# Patient Record
Sex: FEMALE | Race: WHITE | Employment: OTHER | URBAN - METROPOLITAN AREA
[De-identification: names, ages, dates, MRNs, and addresses within clinical notes are randomized per-mention and may not be internally consistent; named-entity substitution may affect disease eponyms.]

---

## 2017-01-19 DIAGNOSIS — I10 BENIGN ESSENTIAL HYPERTENSION: ICD-10-CM

## 2017-01-19 DIAGNOSIS — E11.9 DIABETES MELLITUS TYPE 2, NONINSULIN DEPENDENT (HCC): ICD-10-CM

## 2017-01-19 RX ORDER — CLOPIDOGREL BISULFATE 75 MG/1
75 TABLET ORAL DAILY
Qty: 90 TAB | Refills: 1 | Status: SHIPPED | OUTPATIENT
Start: 2017-01-19 | End: 2017-11-27 | Stop reason: SDUPTHER

## 2017-01-22 RX ORDER — LISINOPRIL 10 MG/1
10 TABLET ORAL DAILY
Qty: 90 TAB | Refills: 0 | Status: SHIPPED | OUTPATIENT
Start: 2017-01-22 | End: 2017-02-03 | Stop reason: SINTOL

## 2017-01-22 NOTE — TELEPHONE ENCOUNTER
Future Appointments:  1/31/2017 1:20 PM Shilpa Samuels  [address]  2/3/2017 8:15 AM Brynn Larson MD PA[facility]

## 2017-01-24 ENCOUNTER — TELEPHONE (OUTPATIENT)
Dept: CARDIOLOGY CLINIC | Age: 67
End: 2017-01-24

## 2017-01-24 NOTE — TELEPHONE ENCOUNTER
Called patient. Verified patient's identity with two identifiers. Notified patient of results and Dr. Judy Peterson message below. She asked if she needs to still f/u Tuesday. I told her I'd check with Dr. Storm Kumari and call her back.     Dr. Romana Kettle advise on f/u. thanks

## 2017-01-24 NOTE — TELEPHONE ENCOUNTER
----- Message from Paris Bueno MD sent at 1/24/2017  4:59 PM EST -----  No afib, so do not need to change anything at this point

## 2017-01-24 NOTE — TELEPHONE ENCOUNTER
Patient's end of service event monitor report is on your desk for review.  F/u scheduled with you for January 31st.  Future Appointments  Date Time Provider Frances Mclaughlin   1/31/2017 1:20 PM Erin Deluca  [address]   2/3/2017 8:15 AM Torrie Butler  [facility]

## 2017-01-26 NOTE — TELEPHONE ENCOUNTER
Called pt. Notified her Dr. Wild Skinner said she can f/u prn. Took her off schedule. Patient verbalizes understanding and denies further questions or concerns.

## 2017-02-03 ENCOUNTER — OFFICE VISIT (OUTPATIENT)
Dept: FAMILY MEDICINE CLINIC | Age: 67
End: 2017-02-03

## 2017-02-03 ENCOUNTER — HOSPITAL ENCOUNTER (OUTPATIENT)
Dept: LAB | Age: 67
Discharge: HOME | End: 2017-02-03
Payer: MEDICARE

## 2017-02-03 VITALS
HEIGHT: 66 IN | HEART RATE: 70 BPM | TEMPERATURE: 98.4 F | RESPIRATION RATE: 18 BRPM | WEIGHT: 248 LBS | DIASTOLIC BLOOD PRESSURE: 86 MMHG | OXYGEN SATURATION: 96 % | SYSTOLIC BLOOD PRESSURE: 134 MMHG | BODY MASS INDEX: 39.86 KG/M2

## 2017-02-03 DIAGNOSIS — G45.9 TRANSIENT CEREBRAL ISCHEMIA, UNSPECIFIED TYPE: ICD-10-CM

## 2017-02-03 DIAGNOSIS — I10 BENIGN ESSENTIAL HYPERTENSION: ICD-10-CM

## 2017-02-03 DIAGNOSIS — M13.0 ARTHRITIS OF MULTIPLE SITES: ICD-10-CM

## 2017-02-03 DIAGNOSIS — E11.9 DIABETES MELLITUS TYPE 2, NONINSULIN DEPENDENT (HCC): Primary | ICD-10-CM

## 2017-02-03 DIAGNOSIS — E78.00 HYPERCHOLESTEROLEMIA: ICD-10-CM

## 2017-02-03 PROBLEM — H26.9 CATARACTS, BILATERAL: Status: ACTIVE | Noted: 2017-02-03

## 2017-02-03 PROCEDURE — 84460 ALANINE AMINO (ALT) (SGPT): CPT

## 2017-02-03 PROCEDURE — 84443 ASSAY THYROID STIM HORMONE: CPT

## 2017-02-03 PROCEDURE — 83036 HEMOGLOBIN GLYCOSYLATED A1C: CPT

## 2017-02-03 PROCEDURE — 82043 UR ALBUMIN QUANTITATIVE: CPT

## 2017-02-03 PROCEDURE — 80061 LIPID PANEL: CPT

## 2017-02-03 PROCEDURE — 84450 TRANSFERASE (AST) (SGOT): CPT

## 2017-02-03 PROCEDURE — 80048 BASIC METABOLIC PNL TOTAL CA: CPT

## 2017-02-03 RX ORDER — LOSARTAN POTASSIUM 50 MG/1
50 TABLET ORAL DAILY
Qty: 30 TAB | Refills: 1 | Status: SHIPPED | OUTPATIENT
Start: 2017-02-03 | End: 2017-03-28 | Stop reason: SDUPTHER

## 2017-02-03 RX ORDER — TRIAMTERENE/HYDROCHLOROTHIAZID 37.5-25 MG
TABLET ORAL
Refills: 3 | COMMUNITY
Start: 2016-11-19 | End: 2017-02-03

## 2017-02-03 RX ORDER — MULTIVITAMIN
1 TABLET ORAL DAILY
COMMUNITY

## 2017-02-03 NOTE — PROGRESS NOTES
HISTORY OF PRESENT ILLNESS  Betsy Barron is a 74 y.o. female. HPI  Fasting follow up diabetes, cholesterol, hypertension, labs and medication check. Not checking her BS's. Overall has been getting along well and feeling pretty well in general.  Still bothered by OA in hips and knees but she is back to exercising. Tries to walk on treadmill on occ but walks in the pool 3 x a week up to an hour. Her wt is down some. She tries to stay as active as possible but some days the pain is near debilitating. She takes Tylenol Arthritis 2 tabs qam so she can get her day going. She takes Tylenol PM qhs prn which also helps. Trying to eat healthier now that the holidays have passed. At her last visit w/ me 11-18-16, I started her on Lisinopril for BP control and renal protection. Shortly after starting this w/in about 1-2 weeks she developed an annoying episodic dry cough. No other symptoms whatsoever, just the annoying dry tickly cough. Wont go away. No sputum, congestion, drainage, fevers, SOB, wheeze. BP's have been good. She has also had f/u w/ Neuro in regards to the TIA and her asa was changed to Plavix only. No bleeding or bruising issues. Review of Systems   Constitutional: Negative. HENT: Negative. Eyes:        Seeing her eye doctor routinely, scheduled for cataract surgery right eye later this year   Respiratory: Negative for hemoptysis, sputum production, shortness of breath and wheezing. Cardiovascular: Negative. Negative for chest pain and palpitations. Gastrointestinal: Negative. Genitourinary: Negative. Musculoskeletal: Negative for myalgias. Neurological: Negative. Endo/Heme/Allergies: Does not bruise/bleed easily.      Problem List  Date Reviewed: 2/3/2017          Codes Class Noted    Cataracts, bilateral ICD-10-CM: H26.9  ICD-9-CM: 366.9  2/3/2017    Overview Signed 2/3/2017  8:58 AM by Dragan Allison MD     R>L, Dr Alphia Boxer TIA (transient ischemic attack) ICD-10-CM: G45.9  ICD-9-CM: 435.9  2016    Overview Signed 2016 11:02 AM by Brynn Larson MD     [facility] 16-11/3/16, Neuro Dr Alli Hoyos             Diabetes mellitus type 2, noninsulin dependent ([facility] ICD-10-CM: E11.9  ICD-9-CM: 250.00  2016    Overview Signed 2016  1:01 PM by Brynn Larson MD     10/2014             ACP (advance care planning) ICD-10-CM: Z71.89  ICD-9-CM: V65.49  2016    Overview Signed 2016  8:36 AM by Brynn Larson MD     Patient has an AMD             Benign essential hypertension ICD-10-CM: I10  ICD-9-CM: 401.1  2016        Bee sting allergy with anaphylaxis reaction ICD-10-CM: Z91.038  ICD-9-CM: V15.06  2015        Arthritis of right foot & ankle ICD-10-CM: M19.90  ICD-9-CM: 716.97  2015    Overview Signed 2015 10:38 AM by Brynn Larson MD     Ortho Dr Bhanu Carr; used a boot x 2 months, off              Right knee pain ICD-10-CM: M25.561  ICD-9-CM: 719.46  3/24/2014    Overview Signed 3/24/2014 11:45 AM by Brynn Larson MD     2014; CSI per Ortho Dr Avery Matute office             Hypercholesterolemia ICD-10-CM: E78.00  ICD-9-CM: 272.0  1/3/2014        Vertebrobasilar artery syndrome ICD-10-CM: G45.0  ICD-9-CM: 435.3  2013    Overview Addendum 2013 12:53 PM by Brynn Larson MD     ER; Neuro Dr Tha Quintana; dc'd on ASA, Zocor and Metoprolol             Impaired fasting glucose ICD-10-CM: R73.01  ICD-9-CM: 790.21  4/3/2012    Overview Signed 4/3/2012  3:09 PM by Brynn Larson MD     3/2012             Overactive bladder ICD-10-CM: C86.82  ICD-9-CM: 596.51  3/28/2011        S/P colonoscopy with polypectomy ICD-10-CM: H09.710  ICD-9-CM: V45.89  Unknown    Overview Signed 3/29/2010 12:47 PM by Brynn Larson MD     benign polyp, f/u 5yrs; Dr. Nola Trevino              (normal spontaneous vaginal delivery) ICD-10-CM: O80  ICD-9-CM: 761 Unknown    Overview Signed 3/29/2010 12:48 PM by Philippe Clarke MD     A2, NSVDx3, SAB x2             S/P abdominal hysterectomy and left salpingo-oophorectomy ICD-10-CM: Z90.710, Z90.79, Z90.721  ICD-9-CM: V88.01, V45.77  Unknown    Overview Signed 3/29/2010 12:49 PM by Philippe Clarke MD     Uterine Fibroids; no HRT             S/P SCC excised from left hand ICD-10-CM: D69.308  ICD-9-CM: 173.62  Unknown    Overview Signed 3/29/2010 12:54 PM by Philippe Clarke MD     Lt hand, SCC excised, Dr. Jarrod Tamayo             Postmenopausal, LMP 38 yo, No HRT history ICD-10-CM: Z78.0  ICD-9-CM: V49.81  3/18/2010        Osteopenia ICD-10-CM: M85.80  ICD-9-CM: 733.90  10/3/2007            Past Surgical History   Procedure Laterality Date    Hx tonsillectomy       T&A    Pr lap,cholecystectomy      Hm dexa scan  10/2007     Osteopenia; spine -1.5, fem neck -1.1    Colonoscopy  2006     q5yrs; Dr Efren Carr; Diverticulosis, internal hemorrhoids, benign polyp    Pr colonoscopy flx dx w/collj spec when pfrmd  2008 , repeat 5 yrs     for rectal bleeding; hemorrhoids, diverticulae, small polyp    Echo 2d adult       Normal    Pr electrocardiogram report  ,      Normal    Hx cholecystectomy      Hx colonoscopy  2013, Dr Aminata Calixial     Diverticulosis only, f/u 10 yrs    Hx orthopaedic       benign tumor removed from left wrist    Hx hysterectomy       [facility]; NO HRT    Hx orthopaedic Right 16, Dr Lester Song     right foot pin/plate       OB History      Para Term  AB TAB SAB Ectopic Multiple Living    5 3 3  2  2           Obstetric Comments    Gyn Dr Shani Owusu          Current Outpatient Prescriptions   Medication Sig    calcium-cholecalciferol, D3, (CALTRATE 600+D) tablet Take 1 Tab by mouth daily.  ACETAMINOPHEN/DIPHENHYDRAMINE (TYLENOL PM PO) Take  by mouth nightly as needed.     simvastatin (ZOCOR) 10 mg tablet TAKE 1 TABLET BY MOUTH EVERY EVENING  metoprolol tartrate (LOPRESSOR) 25 mg tablet TAKE 1 TABLET BY MOUTH EVERY 12 HOURS    lisinopril (PRINIVIL, ZESTRIL) 10 mg tablet Take 1 Tab by mouth daily.  clopidogrel (PLAVIX) 75 mg tab Take 1 Tab by mouth daily.  metFORMIN ER (GLUCOPHAGE XR) 500 mg tablet TAKE 1 TABLET ONCE DAILY WITH DINNER    acetaminophen (TYLENOL) 650 mg CR tablet Take 1,300 mg by mouth daily.  VESICARE 10 mg tablet TAKE ONE (1) TABLET(S) ONCE DAILY    EPIPEN 2-MONICA 0.3 mg/0.3 mL (1:1,000) injection USE IMMEDIATELY AS NEEDED FOR ALLERGIC REACTION TO BEE STING    L. acidoph & paracasei- S therm- Bifido (JULIETA-Q) 8 billion cell cap cap Take 1 Cap by mouth daily.  vitamin e 400 unit Tab Take  by mouth daily.  multivitamins-minerals-lutein (CENTRUM SILVER) Tab Take 1 Tab by mouth daily. No current facility-administered medications for this visit.       Allergies   Allergen Reactions    Oxycodone Hives, Nausea Only and Other (comments)    Bee Sting [Sting, Bee] Swelling    Fish Oil Other (comments)     GI upset    Motrin [Ibuprofen] Swelling and Myalgia    Oxycodone-Acetaminophen Nausea and Vomiting    Pcn [Penicillins] Swelling    Promethazine-Dm Other (comments)     HA, nightmares     Social History     Social History    Marital status:      Spouse name: N/A    Number of children: 3    Years of education: N/A     Occupational History    Fully Retired 2/2008    [employer] her grandchildren 3 x a week      Retired     Social History Main Topics    Smoking status: Never Smoker    Smokeless tobacco: Never Used    Alcohol use 1.0 oz/week     2 Glasses of wine per week      Comment: 2 glasses of wine per week    Drug use: No    Sexual activity: Yes     Partners: Male     Other Topics Concern     Service No    Blood Transfusions No    Caffeine Concern No     2 cups of coffee qam    Occupational Exposure No    Hobby Hazards No    Sleep Concern No    Stress Concern No    Weight Concern No    Special Diet No    Back Care No    Exercise Yes     walks in pool 3 x a week x 1 hr    Bike Helmet Yes    Seat Belt Yes    Self-Exams Yes     Social History Narrative    Patient has an AMD     Immunization History   Administered Date(s) Administered    Influenza High Dose Vaccine PF 2016    Influenza Vaccine 2015    Influenza Vaccine Split 10/01/2012    Pneumococcal Polysaccharide (PPSV-23) 2014    Pneumococcal Vaccine (Unspecified Type) 2009    TD Vaccine 1999    TDAP Vaccine 2009    Td, Adsorbed PF 2015    Varicella Virus Vaccine Live 10/01/2011    Zoster 2011       Family History   Problem Relation Age of Onset    Thyroid Disease Mother      h/o Grave's dz;  age 81yo   Gert. Osteoporosis Mother    Gert. Delayed Awakening Mother    Gert. Stroke Father       age 80    Other Father      AAA age 76, macular degeneration    Alcohol abuse Father      smoker and drinker    Osteoporosis Sister     Other Son      gout    Gout Son     Dementia Maternal Grandmother      dementia and mental illness    Other Brother      heart murmur    Liver Disease Brother      hepatitis c    Anesth Problems Neg Hx      Visit Vitals    /86 (BP 1 Location: Left arm, BP Patient Position: Sitting)    Pulse 70    Temp 98.4 °F (36.9 °C) (Oral)    Resp 18    Ht 5' 6\" (1.676 m)    Wt 248 lb (112.5 kg)    SpO2 96%    BMI 40.03 kg/m2     Physical Exam   Constitutional: No distress. Neck: Neck supple. Carotid bruit is not present. No thyromegaly present. Cardiovascular: Normal rate, regular rhythm and normal heart sounds. No murmur heard. Pulmonary/Chest: Effort normal and breath sounds normal. No respiratory distress. She has no wheezes. She has no rales. Neurological: Coordination normal.   Skin: Skin is warm and dry. Vitals reviewed. ASSESSMENT and PLAN    ICD-10-CM ICD-9-CM    1.  Diabetes mellitus type 2, noninsulin dependent (HCC) E11.9 250.00 HM DIABETES EYE EXAM      MICROALBUMIN, UR, RAND W/ MICROALBUMIN/CREA RATIO      losartan (COZAAR) 50 mg tablet      METABOLIC PANEL, BASIC      HEMOGLOBIN A1C WITH EAG   2. Hypercholesterolemia E78.00 272.0 ALT      AST      LIPID PANEL      TSH 3RD GENERATION   3. Benign essential hypertension I10 401.1 losartan (COZAAR) 50 mg tablet      METABOLIC PANEL, BASIC      TSH 3RD GENERATION   4. Transient cerebral ischemia, unspecified type G45.9 435.9 Cont BP control, target lipid goal, Plavix daily (saw Neuro  and on monotherapy w/o asa ongoing)   5.  Arthritis/DJD of multiple sites M19.90 716.99 Tylenol Arthritis every day prn and cont pool therapy     Fasting labs today  Reviewed diet, nutrition, exercise and weight control  Commended on her efforts thus far  Cardiovascular risk and specific lipid/LDL/BS/BP goals reviewed  Reviewed medications and side effects in detail  Stop Lisinopril  Replace w/ trial of Cozaar 50 mg every day  Continue interim BP monitoring and update if not goal  Further follow up & other recommendations pending review of labs as well  If all remains good and stable, routine follow up 6months, sooner prn  Form for handicap placard, copy for chart

## 2017-02-03 NOTE — MR AVS SNAPSHOT
Visit Information Date & Time Provider Department Dept. Phone Encounter #  
 2/3/2017  8:15 AM Ry Mcghee, [address] [facility] [phone] [id number] Upcoming Health Maintenance Date Due MICROALBUMIN Q1 2/11/2017 EYE EXAM RETINAL OR DILATED Q1 2/11/2017 HEMOGLOBIN A1C Q6M 5/2/2017 FOOT EXAM Q1 8/2/2017 MEDICARE YEARLY EXAM 8/3/2017 LIPID PANEL Q1 11/2/2017 GLAUCOMA SCREENING Q2Y 2/11/2018 BREAST CANCER SCRN MAMMOGRAM 6/15/2018 COLONOSCOPY 10/16/2023 DTaP/Tdap/Td series (3 - Td) 8/31/2025 Allergies as of 2/3/2017  Review Complete On: 2/3/2017 By: Ry Mcghee MD  
  
 Severity Noted Reaction Type Reactions Oxycodone Medium 08/02/2016    Hives, Nausea Only, Other (comments) Bee Sting [Sting, Bee]  03/18/1975    Swelling Fish Oil  11/20/2012    Other (comments) GI upset Lisinopril  02/03/2017    Cough Motrin [Ibuprofen]  03/18/1976    Swelling, Myalgia Oxycodone-acetaminophen  02/11/2016    Nausea and Vomiting Pcn [Penicillins]  03/18/2010   Systemic Swelling Promethazine-dm  03/29/2010   Side Effect Other (comments) HA, nightmares Current Immunizations  Reviewed on 2/3/2017 Name Date Influenza High Dose Vaccine PF 11/18/2016 Influenza Vaccine 11/8/2015 Influenza Vaccine Split 10/1/2012 Pneumococcal Polysaccharide (PPSV-23) 3/24/2014 Pneumococcal Vaccine (Unspecified Type) 2/13/2009 TD Vaccine 5/7/1999 TDAP Vaccine 2/13/2009 Td, Adsorbed PF 8/31/2015 Varicella Virus Vaccine Live 10/1/2011 Zoster 1/1/2011 Reviewed by Ry Mcghee MD on 2/3/2017 at  9:14 AM  
You Were Diagnosed With   
  
 Codes Comments Diabetes mellitus type 2, [facility]insulin [facility]    -  Primary ICD-10-CM: E11.9 ICD-9-CM: 250.00 Hypercholesterolemia     ICD-10-CM: E78.00 ICD-9-CM: 272.0 Benign essential hypertension     ICD-10-CM: I10 
ICD-9-CM: 401.1 Vitals BP Pulse Temp Resp Height(growth percentile) Weight(growth percentile) 134/86 (BP 1 Location: Left arm, BP Patient Position: Sitting) 70 98.4 °F (36.9 °C) (Oral) 18 5' 6\" (1.676 m) 248 lb (112.5 kg) SpO2 BMI OB Status Smoking Status 96% 40.03 kg/m2 Hysterectomy Never Smoker BMI and BSA Data Body Mass Index Body Surface Area 40.03 kg/m 2 2.29 m 2 Preferred Pharmacy Pharmacy Name Phone [facility] [address] [place] [address], [address] [place] [address] [phone] Your Updated Medication List  
  
   
This list is accurate as of: 2/3/17  9:17 AM.  Always use your most recent med list.  
  
  
  
  
 acetaminophen 650 mg CR tablet Commonly known as:  TYLENOL Take 1,300 mg by mouth daily. calcium-cholecalciferol (D3) tablet Commonly known as:  CALTRATE 600+D Take 1 Tab by mouth daily. CENTRUM SILVER Tab tablet Generic drug:  multivitamins-minerals-lutein Take 1 Tab by mouth daily. clopidogrel 75 mg Tab Commonly known as:  PLAVIX Take 1 Tab by mouth daily. EPIPEN 2-MONICA 0.3 mg/0.3 mL injection Generic drug:  EPINEPHrine USE IMMEDIATELY AS NEEDED FOR ALLERGIC REACTION TO BEE STING  
  
 L. acidoph & paracasei- S therm- Bifido 8 billion cell Cap cap Commonly known as:  JULIETA-Q/RISAQUAD Take 1 Cap by mouth daily. losartan 50 mg tablet Commonly known as:  COZAAR Take 1 Tab by mouth daily. metFORMIN  mg tablet Commonly known as:  GLUCOPHAGE XR  
TAKE 1 TABLET ONCE DAILY WITH DINNER  
  
 metoprolol tartrate 25 mg tablet Commonly known as:  LOPRESSOR  
TAKE 1 TABLET BY MOUTH EVERY 12 HOURS  
  
 simvastatin 10 mg tablet Commonly known as:  ZOCOR  
TAKE 1 TABLET BY MOUTH EVERY EVENING  
  
 TYLENOL PM PO Take  by mouth nightly as needed. VESIcare 10 mg tablet Generic drug:  solifenacin TAKE ONE (1) TABLET(S) ONCE DAILY vitamin e 400 unit Tab Take  by mouth daily. Prescriptions Sent to Pharmacy Refills  
 losartan (COZAAR) 50 mg tablet 1 Sig: Take 1 Tab by mouth daily. Class: Normal  
 Pharmacy: [facility] [address], [address] of [address] [place] [address] #: [phone] Route: Oral  
  
We Performed the Following  DIABETES EYE EXAM [6 Custom] MICROALBUMIN, UR, RAND W/ MICROALBUMIN/CREA RATIO [id number] CPT(R)] Patient Instructions Arthritis: Care Instructions Your Care Instructions Arthritis, also called osteoarthritis, is a breakdown of the cartilage that cushions your joints. When the cartilage wears down, your bones rub against each other. This causes pain and stiffness. Many people have some arthritis as they age. Arthritis most often affects the joints of the spine, hands, hips, knees, or feet. You can take simple measures to protect your joints, ease your pain, and help you stay active. Follow-up care is a key part of your treatment and safety. Be sure to make and go to all appointments, and call your doctor if you are having problems. It's also a good idea to know your test results and keep a list of the medicines you take. How can you care for yourself at home? · Stay at a healthy weight. Being overweight puts extra strain on your joints. · Talk to your doctor or physical therapist about exercises that will help ease joint pain. ¨ Stretch. You may enjoy gentle forms of yoga to help keep your joints and muscles flexible. ¨ Walk instead of jog. Other types of exercise that are less stressful on the joints include riding a bicycle, swimming, or water exercise. ¨ Lift weights. Strong muscles help reduce stress on your joints. Stronger thigh muscles, for example, take some of the stress off of the knees and hips. Learn the right way to lift weights so you do not make joint pain worse. · Take your medicines exactly as prescribed. Call your doctor if you think you are having a problem with your medicine. · Take pain medicines exactly as directed. ¨ If the doctor gave you a prescription medicine for pain, take it as prescribed. ¨ If you are not taking a prescription pain medicine, ask your doctor if you can take an over-the-counter medicine. · Use a cane, crutch, walker, or another device if you need help to get around. These can help rest your joints. You also can use other things to make life easier, such as a higher toilet seat and padded handles on kitchen utensils. · Do not sit in low chairs, which can make it hard to get up. · Put heat or cold on your sore joints as needed. Use whichever helps you most. You also can take turns with hot and cold packs. ¨ Apply heat 2 or 3 times a day for 20 to 30 minutesusing a heating pad, hot shower, or hot packto relieve pain and stiffness. ¨ Put ice or a cold pack on your sore joint for 10 to 20 minutes at a time. Put a thin cloth between the ice and your skin. When should you call for help? Call your doctor now or seek immediate medical care if: 
· You have sudden swelling, warmth, or pain in any joint. · You have joint pain and a fever or rash. · You have such bad pain that you cannot use a joint. Watch closely for changes in your health, and be sure to contact your doctor if: 
· You have mild joint symptoms that continue even with more than 6 weeks of care at home. · You have stomach pain or other problems with your medicine. Where can you learn more? Go to http://malcom-sav.info/. Enter N510 in the search box to learn more about \"Arthritis: Care Instructions. \" Current as of: February 24, 2016 Content Version: 11.1 © 7175-3986 [employer].  Care instructions adapted under license by [employer] (which disclaims liability or warranty for this information). If you have questions about a medical condition or this instruction, always ask your healthcare professional. [address] any warranty or liability for your use of this information. Introducing [facility] SERVICES! Dear Bruna Ybarra: Thank you for requesting a iXpert account. Our records indicate that you already have an active iXpert account. You can access your account anytime at [email or web address] Did you know that you can access your hospital and ER discharge instructions at any time in iXpert? You can also review all of your test results from your hospital stay or ER visit. Additional Information If you have questions, please visit the Frequently Asked Questions section of the iXpert website at [email or web address] Remember, iXpert is NOT to be used for urgent needs. For medical emergencies, dial 911. Now available from your iPhone and Android! Please provide this summary of care documentation to your next provider. Your primary care clinician is listed as KELSEA VILLALPANDO. If you have any questions after today's visit, please call [phone].

## 2017-02-03 NOTE — PROGRESS NOTES
Chief Complaint   Patient presents with    Hypertension     fasting follow up    Diabetes    Cholesterol Problem     1. Have you been to the ER, urgent care clinic since your last visit? Hospitalized since your last visit? No    2. Have you seen or consulted any other health care providers outside of the [address] [place] [address] since your last visit? Include any pap smears or colon screening.    Dr Sandy Weaver

## 2017-02-03 NOTE — PATIENT INSTRUCTIONS
Arthritis: Care Instructions  Your Care Instructions  Arthritis, also called osteoarthritis, is a breakdown of the cartilage that cushions your joints. When the cartilage wears down, your bones rub against each other. This causes pain and stiffness. Many people have some arthritis as they age. Arthritis most often affects the joints of the spine, hands, hips, knees, or feet. You can take simple measures to protect your joints, ease your pain, and help you stay active. Follow-up care is a key part of your treatment and safety. Be sure to make and go to all appointments, and call your doctor if you are having problems. It's also a good idea to know your test results and keep a list of the medicines you take. How can you care for yourself at home? · Stay at a healthy weight. Being overweight puts extra strain on your joints. · Talk to your doctor or physical therapist about exercises that will help ease joint pain. ¨ Stretch. You may enjoy gentle forms of yoga to help keep your joints and muscles flexible. ¨ Walk instead of jog. Other types of exercise that are less stressful on the joints include riding a bicycle, swimming, or water exercise. ¨ Lift weights. Strong muscles help reduce stress on your joints. Stronger thigh muscles, for example, take some of the stress off of the knees and hips. Learn the right way to lift weights so you do not make joint pain worse. · Take your medicines exactly as prescribed. Call your doctor if you think you are having a problem with your medicine. · Take pain medicines exactly as directed. ¨ If the doctor gave you a prescription medicine for pain, take it as prescribed. ¨ If you are not taking a prescription pain medicine, ask your doctor if you can take an over-the-counter medicine. · Use a cane, crutch, walker, or another device if you need help to get around. These can help rest your joints.  You also can use other things to make life easier, such as a higher toilet seat and padded handles on kitchen utensils. · Do not sit in low chairs, which can make it hard to get up. · Put heat or cold on your sore joints as needed. Use whichever helps you most. You also can take turns with hot and cold packs. ¨ Apply heat 2 or 3 times a day for 20 to 30 minutesusing a heating pad, hot shower, or hot packto relieve pain and stiffness. ¨ Put ice or a cold pack on your sore joint for 10 to 20 minutes at a time. Put a thin cloth between the ice and your skin. When should you call for help? Call your doctor now or seek immediate medical care if:  · You have sudden swelling, warmth, or pain in any joint. · You have joint pain and a fever or rash. · You have such bad pain that you cannot use a joint. Watch closely for changes in your health, and be sure to contact your doctor if:  · You have mild joint symptoms that continue even with more than 6 weeks of care at home. · You have stomach pain or other problems with your medicine. Where can you learn more? Go to http://malcom-sav.info/. Enter Y052 in the search box to learn more about \"Arthritis: Care Instructions. \"  Current as of: February 24, 2016  Content Version: 11.1  © 0193-8526 "[employer]. Care instructions adapted under license by [employer] (which disclaims liability or warranty for this information). If you have questions about a medical condition or this instruction, always ask your healthcare professional. [place] [address] any warranty or liability for your use of this information.

## 2017-02-04 LAB
ALBUMIN/CREAT UR: 4.8 MG/G CREAT (ref 0–30)
ALT SERPL-CCNC: 12 IU/L (ref 0–32)
AST SERPL-CCNC: 17 IU/L (ref 0–40)
BUN SERPL-MCNC: 14 MG/DL (ref 8–27)
BUN/CREAT SERPL: 19 (ref 11–26)
CALCIUM SERPL-MCNC: 9.7 MG/DL (ref 8.7–10.3)
CHLORIDE SERPL-SCNC: 100 MMOL/L (ref 96–106)
CHOLEST SERPL-MCNC: 163 MG/DL (ref 100–199)
CO2 SERPL-SCNC: 25 MMOL/L (ref 18–29)
CREAT SERPL-MCNC: 0.72 MG/DL (ref 0.57–1)
CREAT UR-MCNC: 88.9 MG/DL
EST. AVERAGE GLUCOSE BLD GHB EST-MCNC: 131 MG/DL
GLUCOSE SERPL-MCNC: 116 MG/DL (ref 65–99)
HBA1C MFR BLD: 6.2 % (ref 4.8–5.6)
HDLC SERPL-MCNC: 46 MG/DL
INTERPRETATION, 910389: NORMAL
LDLC SERPL CALC-MCNC: 90 MG/DL (ref 0–99)
MICROALBUMIN UR-MCNC: 4.3 UG/ML
POTASSIUM SERPL-SCNC: 4.4 MMOL/L (ref 3.5–5.2)
SODIUM SERPL-SCNC: 141 MMOL/L (ref 134–144)
TRIGL SERPL-MCNC: 136 MG/DL (ref 0–149)
TSH SERPL DL<=0.005 MIU/L-ACNC: 1.31 UIU/ML (ref 0.45–4.5)
VLDLC SERPL CALC-MCNC: 27 MG/DL (ref 5–40)

## 2017-02-22 NOTE — PROGRESS NOTES
TG much better, best reading she has gotten the last several checks. Rest of lipids overall good. HDL is 46, goal >50, but it is steadily getting better, so great job. Urine, liver, kidney, thyroid all good and normal.   Fasting  and HgbA1c improved down from 6.5 last time to 6.2 now. Awesome! Keep up the good work! Showing good progress. Fasting follow up 6months  How is the cough since changing Lisinopril to the Losartan?   Did the cough go away and how are her BP's?

## 2017-02-24 NOTE — PROGRESS NOTES
Advised patient of results and recommendations, she states that the cough stopped this past Sun. Her BP is good she doesn't have #'s to give me right now but will email us some shortly. I transferred her to set up 6 month f/u. She will call me back if cough returns or BP goes up.

## 2017-04-12 ENCOUNTER — TELEPHONE (OUTPATIENT)
Dept: NEUROLOGY | Age: 67
End: 2017-04-12

## 2017-04-12 NOTE — TELEPHONE ENCOUNTER
[facility] calling because the pt is having surgery and she is going to need to be sedated. They are worried with her past history of TIAs. They would like the last office note. Her surgery is scheduled for April 20th. They do not have [facility].

## 2017-04-20 ENCOUNTER — HOSPITAL ENCOUNTER (OUTPATIENT)
Dept: LAB | Age: 67
Discharge: HOME | End: 2017-04-20

## 2017-06-19 LAB — MAMMOGRAPHY, EXTERNAL: NORMAL

## 2017-08-21 RX ORDER — SIMVASTATIN 10 MG/1
TABLET, FILM COATED ORAL
Qty: 90 TAB | Refills: 1 | Status: SHIPPED | OUTPATIENT
Start: 2017-08-21 | End: 2018-02-19 | Stop reason: SDUPTHER

## 2017-08-28 ENCOUNTER — OFFICE VISIT (OUTPATIENT)
Dept: FAMILY MEDICINE CLINIC | Age: 67
End: 2017-08-28

## 2017-08-28 VITALS
OXYGEN SATURATION: 94 % | TEMPERATURE: 98.4 F | WEIGHT: 253.2 LBS | HEIGHT: 66 IN | DIASTOLIC BLOOD PRESSURE: 76 MMHG | SYSTOLIC BLOOD PRESSURE: 146 MMHG | RESPIRATION RATE: 18 BRPM | HEART RATE: 68 BPM | BODY MASS INDEX: 40.69 KG/M2

## 2017-08-28 DIAGNOSIS — E11.9 DIABETES MELLITUS TYPE 2, NONINSULIN DEPENDENT (HCC): ICD-10-CM

## 2017-08-28 DIAGNOSIS — I10 BENIGN ESSENTIAL HYPERTENSION: ICD-10-CM

## 2017-08-28 DIAGNOSIS — Z00.00 ROUTINE GENERAL MEDICAL EXAMINATION AT A HEALTH CARE FACILITY: Primary | ICD-10-CM

## 2017-08-28 DIAGNOSIS — E78.00 HYPERCHOLESTEROLEMIA: ICD-10-CM

## 2017-08-28 NOTE — PROGRESS NOTES
HISTORY OF PRESENT ILLNESS   HPI  6 month follow up diabetes, cholesterol, hypertension, labs and med check. Overall has been getting along well and feeling well in general.  Admits that she has not been as compliant w/ diet or exercise this summer. Wt is up some. She does not check BS's per personal preference. No recent BP checks bc had been running good in the interim here, but up some today. REVIEW OF SYMPTOMS     Review of Systems   Constitutional: Negative. Eyes:        Scheduled to see Dr Salina Mariscal this fall. Has cataracts that are being monitored. Respiratory: Negative. Cardiovascular: Negative. Gastrointestinal: Negative. Genitourinary: Negative. Musculoskeletal: Negative for myalgias.    Neurological: Negative for dizziness and tingling.           PROBLEM LIST/MEDICAL HISTORY      Problem List  Date Reviewed: 8/28/2017          Codes Class Noted    Cataracts, bilateral ICD-10-CM: H26.9  ICD-9-CM: 366.9  2/3/2017    Overview Addendum 8/28/2017  8:32 AM by Cydney Hunter MD     R>L, Dr Aj Earl, changing to Dr Salina Mariscal Fall 2017             TIA (transient ischemic attack) ICD-10-CM: G45.9  ICD-9-CM: 435.9  11/18/2016    Overview Signed 11/18/2016 11:02 AM by Cydney Hunter MD     [facility] 11/2/16-11/3/16, Neuro Dr Cesario Rivera             Diabetes mellitus type 2, noninsulin dependent [facility] ICD-10-CM: E11.9  ICD-9-CM: 250.00  11/18/2016    Overview Signed 11/18/2016  1:01 PM by Cydney Hunter MD     10/2014             ACP (advance care planning) ICD-10-CM: Z71.89  ICD-9-CM: V65.49  8/2/2016    Overview Signed 8/2/2016  8:36 AM by Cydney Hunter MD     Patient has an AMD             Benign essential hypertension ICD-10-CM: I10  ICD-9-CM: 401.1  8/2/2016        Bee sting allergy with anaphylaxis reaction ICD-10-CM: Z91.038  ICD-9-CM: V15.06  7/27/2015        Arthritis of right foot & ankle ICD-10-CM: M19.071  ICD-9-CM: 716.97  5/21/2015    Overview Signed 2015 10:38 AM by Scotty Chang MD     Ortho Dr Bran Scott; used a boot x 2 months, off              Right knee pain ICD-10-CM: M25.561  ICD-9-CM: 719.46  3/24/2014    Overview Signed 3/24/2014 11:45 AM by Scotty Chang MD     2014; CSI per Ortho Dr Rachana Suárez office             Hypercholesterolemia ICD-10-CM: E78.00  ICD-9-CM: 272.0  1/3/2014        Vertebrobasilar artery syndrome ICD-10-CM: G45.0  ICD-9-CM: 435.3  2013    Overview Addendum 2013 12:53 PM by Scotty Chang MD     ER; Neuro Dr Jono Phillips; dc'd on ASA, Zocor and Metoprolol             Impaired fasting glucose ICD-10-CM: R73.01  ICD-9-CM: 790.21  4/3/2012    Overview Signed 4/3/2012  3:09 PM by Scotty Chang MD     3/2012             Overactive bladder ICD-10-CM: E15.16  ICD-9-CM: 596.51  3/28/2011        S/P colonoscopy with polypectomy ICD-10-CM: W02.145  ICD-9-CM: V45.89  Unknown    Overview Signed 3/29/2010 12:47 PM by Scotty Chang MD     benign polyp, f/u 5yrs; Dr. Dimas MONTGOMERYVD (normal spontaneous vaginal delivery) ICD-10-CM: O80  ICD-9-CM: 0  Unknown    Overview Signed 3/29/2010 12:48 PM by Scotty Chang MD     A2, NSVDx3, SAB x2             S/P abdominal hysterectomy and left salpingo-oophorectomy ICD-10-CM: Z90.710, Z90.79, Z90.721  ICD-9-CM: V88.01, V45.77  Unknown    Overview Signed 3/29/2010 12:49 PM by Scotty Chang MD     Uterine Fibroids; no HRT             S/P SCC excised from left hand ICD-10-CM: I36.027  ICD-9-CM: 173.62  Unknown    Overview Signed 3/29/2010 12:54 PM by Scotty Chang MD     Lt hand, SCC excised, Dr. Vikki Anand             Postmenopausal, LMP 38 yo, No HRT history ICD-10-CM: Z78.0  ICD-9-CM: V49.81  3/18/2010        Osteopenia ICD-10-CM: M85.80  ICD-9-CM: 733.90  10/3/2007                  PAST SURGICAL HISTORY       Past Surgical History:   Procedure Laterality Date    COLONOSCOPY  2006    q5yrs; Dr Dimas Caceres; Diverticulosis, internal hemorrhoids, benign polyp    ECHO 2D ADULT  1997    Normal    ELECTROCARDIOGRAM REPORT  2010, 2009    Normal    HM DEXA SCAN  10/2007    Osteopenia; spine -1.5, fem neck -1.1    HX CHOLECYSTECTOMY      HX COLONOSCOPY  9/19/2013, Dr Frankie Hua    Diverticulosis only, f/u 10 yrs    HX CYST REMOVAL Right 2017    2 cyss removed from right middle finger    HX HYSTERECTOMY  1980    [facility]O; NO HRT    HX ORTHOPAEDIC      benign tumor removed from left wrist    HX ORTHOPAEDIC Right 1/13/16, Dr Dakota Ramirez    right foot pin/plate    HX TONSILLECTOMY  1950    T&A    LAP,CHOLECYSTECTOMY  9/92    MN COLONOSCOPY FLX DX W/COLLJ SPEC WHEN PFRMD  5/2008 , repeat 5 yrs    for rectal bleeding; hemorrhoids, diverticulae, small polyp         MEDICATIONS      Current Outpatient Prescriptions   Medication Sig    simvastatin (ZOCOR) 10 mg tablet TAKE 1 TABLET BY MOUTH EVERY EVENING    losartan (COZAAR) 50 mg tablet TAKE 1 TABLET BY MOUTH EVERY DAY    metFORMIN ER (GLUCOPHAGE XR) 500 mg tablet TAKE 1 TABLET ONCE DAILY WITH DINNER    calcium-cholecalciferol, D3, (CALTRATE 600+D) tablet Take 1 Tab by mouth daily.  ACETAMINOPHEN/DIPHENHYDRAMINE (TYLENOL PM PO) Take  by mouth nightly as needed.  metoprolol tartrate (LOPRESSOR) 25 mg tablet TAKE 1 TABLET BY MOUTH EVERY 12 HOURS    clopidogrel (PLAVIX) 75 mg tab Take 1 Tab by mouth daily.  acetaminophen (TYLENOL) 650 mg CR tablet Take 1,300 mg by mouth daily.  EPIPEN 2-MONICA 0.3 mg/0.3 mL (1:1,000) injection USE IMMEDIATELY AS NEEDED FOR ALLERGIC REACTION TO BEE STING    L. acidoph & paracasei- S therm- Bifido (JULIETA-Q) 8 billion cell cap cap Take 1 Cap by mouth daily.  vitamin e 400 unit Tab Take  by mouth daily.  multivitamins-minerals-lutein (CENTRUM SILVER) Tab Take 1 Tab by mouth daily.  VESICARE 10 mg tablet TAKE ONE (1) TABLET(S) ONCE DAILY     No current facility-administered medications for this visit.            ALLERGIES     Allergies Allergen Reactions    Oxycodone Hives, Nausea Only and Other (comments)    Ace Inhibitors Cough    Bee Sting [Sting, Bee] Swelling    Fish Oil Other (comments)     GI upset    Lisinopril Cough    Motrin [Ibuprofen] Swelling and Myalgia    Oxycodone-Acetaminophen Nausea and Vomiting    Pcn [Penicillins] Swelling    Promethazine-Dm Other (comments)     HA, nightmares          SOCIAL HISTORY       Social History     Social History    Marital status:      Spouse name: N/A    Number of children: 3    Years of education: N/A     Occupational History    Fully Retired 2008    [employer] her grandchildren 3 x a week      Retired     Social History Main Topics    Smoking status: Never Smoker    Smokeless tobacco: Never Used    Alcohol use 1.0 oz/week     2 Glasses of wine per week      Comment: 2 glasses of wine per week    Drug use: No    Sexual activity: Yes     Partners: Male     Other Topics Concern     Service No    Blood Transfusions No    Caffeine Concern No     2 cups of coffee qam    Occupational Exposure No    Hobby Hazards No    Sleep Concern No    Stress Concern No    Weight Concern No    Special Diet No    Back Care No    Exercise No    Bike Helmet Yes    Seat Belt Yes    Self-Exams Yes     Social History Narrative    Patient has an AMD        IMMUNIZATIONS  Immunization History   Administered Date(s) Administered    Influenza High Dose Vaccine PF 2016    Influenza Vaccine 2015    Influenza Vaccine Split 10/01/2012    Pneumococcal Polysaccharide (PPSV-23) 2014    TD Vaccine 1999    TDAP Vaccine 2009    Td, Adsorbed PF 2015    Varicella Virus Vaccine Live 10/01/2011    ZZZ-RETIRED (DO NOT USE) Pneumococcal Vaccine (Unspecified Type) 2009    Zoster 2011         FAMILY HISTORY     Family History   Problem Relation Age of Onset    Thyroid Disease Mother      h/o Grave's dz;  age 79yo   [facility] Osteoporosis Mother  Delayed Awakening Mother    [facility] Stroke Father       age 80    Other Father      AAA age 76, macular degeneration    Alcohol abuse Father      smoker and drinker    Osteoporosis Sister     Other Son      gout    Gout Son     Dementia Maternal Grandmother      dementia and mental illness    Other Brother      heart murmur    Liver Disease Brother      hepatitis c    Anesth Problems Neg Hx          VITALS     Visit Vitals    /76 (BP 1 Location: Left arm, BP Patient Position: Sitting)    Pulse 68    Temp 98.4 °F (36.9 °C) (Oral)    Resp 18    Ht 5' 6\" (1.676 m)    Wt 253 lb 3.2 oz (114.9 kg)    SpO2 94%    BMI 40.87 kg/m2          PHYSICAL EXAMINATION     Physical Exam   Constitutional: No distress. Neck: Neck supple. No JVD present. Cardiovascular: Normal rate, regular rhythm, normal heart sounds and intact distal pulses. No murmur heard. Pulmonary/Chest: Effort normal and breath sounds normal. No respiratory distress. Abdominal: Soft. Bowel sounds are normal. She exhibits no distension and no mass. There is no tenderness. Musculoskeletal: She exhibits no edema or tenderness. Neurological:   Normal monofilament testing B feet. Skin warm, dry, intact. Vitals reviewed.              ASSESSMENT & PLAN       ICD-10-CM ICD-9-CM    1. Routine general medical examination at a health care facility Z00.00 V70.0 Medicare AWV,    2. Diabetes mellitus type 2, noninsulin dependent (HCC) E11.9 250.00  DIABETES FOOT EXAM       DIABETES EYE EXAM      HEMOGLOBIN A1C WITH EAG      METABOLIC PANEL, BASIC   3. Hypercholesterolemia E78.00 272.0 LIPID PANEL      ALT      AST   4.  Benign essential hypertension I10 401.1      She will RTC for the above fasting labs later this week  Reviewed diet, nutrition, exercise and weight control  Cardiovascular risk and specific lipid/LDL/BS/BP goals reviewed  Her BP's had been running good but today's reading is high  She will restart healthier/diabetic diet, resume regular exercise at least 3 x a week and monitor interim BP's.   She will fwd her readings to me over the next several weeks and if not maintaining goal ~ 130/80 or less, will increase Cozaar and this was d/w her today  Reviewed medications and side effects in detail  Specific diabetic recommendations: foot care discussed and Podiatry visits discussed and annual eye examinations at Ophthalmology discussed   Normal diabetic foot exam today  Scheduled to see Dr Khanh Jimenez for eye exam this fall  Further follow up & other recommendations pending review of labs as well  If all remains good and stable, RTC for 6month follow up, sooner prn

## 2017-08-28 NOTE — PROGRESS NOTES
.  Chief Complaint   Patient presents with    Hypertension     6 month follow up-not fasting -  will come back for labs    Diabetes    Cholesterol Problem       1. Have you been to the ER, urgent care clinic since your last visit? Hospitalized since your last visit? No    2. Have you seen or consulted any other health care providers outside of the [address] [place] [address] since your last visit? Include any pap smears or colon screening.    Yes Dr Ileana Ramsey

## 2017-08-28 NOTE — PATIENT INSTRUCTIONS
Diabetes Foot Health: Care Instructions  Your Care Instructions    When you have diabetes, your feet need extra care and attention. Diabetes can damage the nerve endings and blood vessels in your feet, making you less likely to notice when your feet are injured. Diabetes also limits your body's ability to fight infection and get blood to areas that need it. If you get a minor foot injury, it could become an ulcer or a serious infection. With good foot care, you can prevent most of these problems. Caring for your feet can be quick and easy. Most of the care can be done when you are bathing or getting ready for bed. Follow-up care is a key part of your treatment and safety. Be sure to make and go to all appointments, and call your doctor if you are having problems. Its also a good idea to know your test results and keep a list of the medicines you take. How can you care for yourself at home? · Keep your blood sugar close to normal by watching what and how much you eat, monitoring blood sugar, taking medicines if prescribed, and getting regular exercise. · Do not smoke. Smoking affects blood flow and can make foot problems worse. If you need help quitting, talk to your doctor about stop-smoking programs and medicines. These can increase your chances of quitting for good. · Eat a diet that is low in fats. High fat intake can cause fat to build up in your blood vessels and decrease blood flow. · Inspect your feet daily for blisters, cuts, cracks, or sores. If you cannot see well, use a mirror or have someone help you. · Take care of your feet:  [facility] [place] [facility] your feet every day. Use warm (not hot) water. Check the water temperature with your wrists or other part of your body, not your feet. ¨ Dry your feet well. Pat them dry. Do not rub the skin on your feet too hard. Dry well between your toes. If the skin on your feet stays moist, bacteria or a fungus can grow, which can lead to infection.   ¨ Keep your skin soft. Use moisturizing skin cream to keep the skin on your feet soft and prevent calluses and cracks. But do not put the cream between your toes, and stop using any cream that causes a rash. ¨ Clean underneath your toenails carefully. Do not use a sharp object to clean underneath your toenails. Use the blunt end of a nail file or other rounded tool. ¨ Trim and file your toenails straight across to prevent ingrown toenails. Use a nail clipper, not scissors. Use an emery board to smooth the edges. · Change socks daily. Socks without seams are best, because seams often rub the feet. You can find socks for people with diabetes from specialty catalogs. · Look inside your shoes every day for things like gravel or torn linings, which could cause blisters or sores. · Buy shoes that fit well:  ¨ Look for shoes that have plenty of space around the toes. This helps prevent bunions and blisters. ¨ Try on shoes while wearing the kind of socks you will usually wear with the shoes. ¨ Avoid plastic shoes. They may rub your feet and cause blisters. Good shoes should be made of materials that are flexible and breathable, such as leather or cloth. ¨ Break in new shoes slowly by wearing them for no more than an hour a day for several days. Take extra time to check your feet for red areas, blisters, or other problems after you wear new shoes. · Do not go barefoot. Do not wear sandals, and do not wear shoes with very thin soles. Thin soles are easy to puncture. They also do not protect your feet from hot pavement or cold weather. · Have your doctor check your feet during each visit. If you have a foot problem, see your doctor. Do not try to treat an early foot problem at home. Home remedies or treatments that you can buy without a prescription (such as corn removers) can be harmful. · Always get early treatment for foot problems. A minor irritation can lead to a major problem if not properly cared for early.   When should you call for help? Call your doctor now or seek immediate medical care if:  · You have a foot sore, an ulcer or break in the skin that is not healing after 4 days, bleeding corns or calluses, or an ingrown toenail. · You have blue or black areas, which can mean bruising or blood flow problems. · You have peeling skin or tiny blisters between your toes or cracking or oozing of the skin. · You have a fever for more than 24 hours and a foot sore. · You have new numbness or tingling in your feet that does not go away after you move your feet or change positions. · You have unexplained or unusual swelling of the foot or ankle. Watch closely for changes in your health, and be sure to contact your doctor if:  · You cannot do proper foot care. Where can you learn more? Go to http://malcom-sav.info/. Enter A739 in the search box to learn more about \"Diabetes Foot Health: Care Instructions. \"  Current as of: March 13, 2017  Content Version: 11.3  © 7068-9471 [employer]. Care instructions adapted under license by [employer] (which disclaims liability or warranty for this information). If you have questions about a medical condition or this instruction, always ask your healthcare professional. [address] any warranty or liability for your use of this information.

## 2017-08-28 NOTE — PROGRESS NOTES
Yadi Bonilla is a 69 y.o. female and presents for annual Medicare Wellness Visit. Problem List: Reviewed with patient and discussed risk factors.     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.038    ACP (advance care planning) Z71.89    Benign essential hypertension I10    TIA (transient ischemic attack) G45.9    Diabetes mellitus type 2, noninsulin dependent ([facility] [address] E11.9    Cataracts, bilateral H26.9       Current medical providers:  Patient Care Team:  Sun Triplett MD as PCP - Austin Morejon MD (Cardiology)    PSH: Reviewed with patient  Past Surgical History:   Procedure Laterality Date    COLONOSCOPY  2006    q5yrs; Dr Patria Angel; Diverticulosis, internal hemorrhoids, benign polyp    ECHO 2D ADULT      Normal    ELECTROCARDIOGRAM REPORT  ,     Normal    HM DEXA SCAN  10/2007    Osteopenia; spine -1.5, fem neck -1.1    HX CHOLECYSTECTOMY      HX COLONOSCOPY  2013, Dr Garland Pat    Diverticulosis only, f/u 10 yrs    HX CYST REMOVAL Right     2 cyss removed from right middle finger    HX HYSTERECTOMY      [facility]; NO HRT    HX ORTHOPAEDIC      benign tumor removed from left wrist    HX ORTHOPAEDIC Right 16, Dr Thurman Rom    right foot pin/plate    HX TONSILLECTOMY  1950    T&A    LAP,CHOLECYSTECTOMY      CO COLONOSCOPY FLX DX W/COLLJ [address] WHEN PFRMD  2008 , repeat 5 yrs    for rectal bleeding; hemorrhoids, diverticulae, small polyp        SH: Reviewed with patient  Social History   Substance Use Topics    Smoking status: Never Smoker    Smokeless tobacco: Never Used    Alcohol use 1.0 oz/week     2 Glasses of wine per week      Comment: 2 glasses of wine per week       FH: Reviewed with patient  Family History   Problem Relation Age of Onset    Thyroid Disease Mother      h/o Grave's dz;  age 81yo   [facility] Osteoporosis Mother    [facility] Delayed Awakening Mother    [facility] Stroke Father       age 80    Other Father      AAA age 76, macular degeneration    Alcohol abuse Father      smoker and drinker    Osteoporosis Sister     Other Son      gout    Gout Son     Dementia Maternal Grandmother      dementia and mental illness    Other Brother      heart murmur    Liver Disease Brother      hepatitis c    Anesth Problems Neg Hx        Medications/Allergies: Reviewed with patient  Current Outpatient Prescriptions on File Prior to Visit   Medication Sig Dispense Refill    simvastatin (ZOCOR) 10 mg tablet TAKE 1 TABLET BY MOUTH EVERY EVENING 90 Tab 1    losartan (COZAAR) 50 mg tablet TAKE 1 TABLET BY MOUTH EVERY DAY 30 Tab 5    metFORMIN ER (GLUCOPHAGE XR) 500 mg tablet TAKE 1 TABLET ONCE DAILY WITH DINNER 90 Tab 2    calcium-cholecalciferol, D3, (CALTRATE 600+D) tablet Take 1 Tab by mouth daily.  ACETAMINOPHEN/DIPHENHYDRAMINE (TYLENOL PM PO) Take  by mouth nightly as needed.  metoprolol tartrate (LOPRESSOR) 25 mg tablet TAKE 1 TABLET BY MOUTH EVERY 12 HOURS 180 Tab 3    clopidogrel (PLAVIX) 75 mg tab Take 1 Tab by mouth daily. 90 Tab 1    acetaminophen (TYLENOL) 650 mg CR tablet Take 1,300 mg by mouth daily.  EPIPEN 2-MONICA 0.3 mg/0.3 mL (1:1,000) injection USE IMMEDIATELY AS NEEDED FOR ALLERGIC REACTION TO BEE STING 2 Each 1    L. acidoph & paracasei- S therm- Bifido (JULIETA-Q) 8 billion cell cap cap Take 1 Cap by mouth daily.  10 Cap 0    vitamin e 400 unit Tab Take  by mouth daily.      multivitamins-minerals-lutein (CENTRUM SILVER) Tab Take 1 Tab by mouth daily.  VESICARE 10 mg tablet TAKE ONE (1) TABLET(S) ONCE DAILY 30 Tab 3     No current facility-administered medications on file prior to visit. Allergies   Allergen Reactions    Oxycodone Hives, Nausea Only and Other (comments)    Ace Inhibitors Cough    Bee Sting [Sting, Bee] Swelling    Fish Oil Other (comments)     GI upset    Lisinopril Cough    Motrin [Ibuprofen] Swelling and Myalgia    Oxycodone-Acetaminophen Nausea and Vomiting    Pcn [Penicillins] Swelling    Promethazine-Dm Other (comments)     HA, nightmares       Objective:  Visit Vitals    /76 (BP 1 Location: Left arm, BP Patient Position: Sitting)    Pulse 68    Temp 98.4 °F (36.9 °C) (Oral)    Resp 18    Ht 5' 6\" (1.676 m)    Wt 253 lb 3.2 oz (114.9 kg)    SpO2 94%    BMI 40.87 kg/m2    Body mass index is 40.87 kg/(m^2). Assessment of cognitive impairment: Alert and oriented x 3    Depression Screen:   PHQ over the last two weeks 8/28/2017   PHQ Not Done -   Little interest or pleasure in doing things Not at all   Feeling down, depressed or hopeless Not at all   Total Score PHQ 2 0       Fall Risk Assessment:    Fall Risk Assessment, last 12 mths 8/28/2017   Able to walk? Yes   Fall in past 12 months? No   Fall with injury? -   Number of falls in past 12 months -   Fall Risk Score -       Functional Ability:   Does the patient exhibit a steady gait? yes   How long did it take the patient to get up and walk from a sitting position? 2 sec   Is the patient self reliant?  (ie can do own laundry, meals, household chores)  yes     Does the patient handle his/her own medications? yes     Does the patient handle his/her own money? yes     Is the patients home safe (ie good lighting, handrails on stairs and bath, etc.)? yes     Did you notice or did patient express any hearing difficulties?    no     Did you notice or did patient express any vision difficulties?   no     Were distance and reading eye charts used? no       Advance Care Planning:   Patient was offered the opportunity to discuss advance care planning:  yes     Does patient have an Advance Directive:  yes   If no, did you provide information on Caring Connections? Plan:      Orders Placed This Encounter    HEMOGLOBIN A1C WITH EAG    LIPID PANEL    METABOLIC PANEL, BASIC    ALT    AST    HM DIABETES FOOT EXAM     DIABETES EYE EXAM       Health Maintenance   Topic Date Due    EYE EXAM RETINAL OR DILATED Q1  02/11/2017    INFLUENZA AGE 9 TO ADULT  08/01/2017    MICROALBUMIN Q1  02/03/2018    LIPID PANEL Q1  02/03/2018    GLAUCOMA SCREENING Q2Y  02/11/2018    HEMOGLOBIN A1C Q6M  02/28/2018    FOOT EXAM Q1  08/28/2018    MEDICARE YEARLY EXAM  08/29/2018    BREAST CANCER SCRN MAMMOGRAM  06/19/2019    COLONOSCOPY  10/16/2023    DTaP/Tdap/Td series (3 - Td) 08/31/2025    Hepatitis C Screening  Addressed    OSTEOPOROSIS SCREENING (DEXA)  Completed    ZOSTER VACCINE AGE 60>  Completed    Pneumococcal 65+ Low/Medium Risk  Completed       *Patient verbalized understanding and agreement with the plan. A copy of the After Visit Summary with personalized health plan was given to the patient today.

## 2017-09-01 ENCOUNTER — HOSPITAL ENCOUNTER (OUTPATIENT)
Dept: LAB | Age: 67
Discharge: HOME | End: 2017-09-01
Payer: MEDICARE

## 2017-09-01 PROCEDURE — 80048 BASIC METABOLIC PNL TOTAL CA: CPT

## 2017-09-01 PROCEDURE — 36415 COLL VENOUS BLD VENIPUNCTURE: CPT

## 2017-09-01 PROCEDURE — 84460 ALANINE AMINO (ALT) (SGPT): CPT

## 2017-09-01 PROCEDURE — 84450 TRANSFERASE (AST) (SGOT): CPT

## 2017-09-01 PROCEDURE — 83036 HEMOGLOBIN GLYCOSYLATED A1C: CPT

## 2017-09-01 PROCEDURE — 80061 LIPID PANEL: CPT

## 2017-09-02 LAB
ALT SERPL-CCNC: 14 IU/L (ref 0–32)
AST SERPL-CCNC: 15 IU/L (ref 0–40)
BUN SERPL-MCNC: 16 MG/DL (ref 8–27)
BUN/CREAT SERPL: 25 (ref 12–28)
CALCIUM SERPL-MCNC: 9.6 MG/DL (ref 8.7–10.3)
CHLORIDE SERPL-SCNC: 100 MMOL/L (ref 96–106)
CHOLEST SERPL-MCNC: 138 MG/DL (ref 100–199)
CO2 SERPL-SCNC: 26 MMOL/L (ref 18–29)
CREAT SERPL-MCNC: 0.64 MG/DL (ref 0.57–1)
EST. AVERAGE GLUCOSE BLD GHB EST-MCNC: 128 MG/DL
GLUCOSE SERPL-MCNC: 109 MG/DL (ref 65–99)
HBA1C MFR BLD: 6.1 % (ref 4.8–5.6)
HDLC SERPL-MCNC: 43 MG/DL
INTERPRETATION, 910389: NORMAL
LDLC SERPL CALC-MCNC: 68 MG/DL (ref 0–99)
POTASSIUM SERPL-SCNC: 4.3 MMOL/L (ref 3.5–5.2)
SODIUM SERPL-SCNC: 141 MMOL/L (ref 134–144)
TRIGL SERPL-MCNC: 133 MG/DL (ref 0–149)
VLDLC SERPL CALC-MCNC: 27 MG/DL (ref 5–40)

## 2017-09-22 DIAGNOSIS — I10 BENIGN ESSENTIAL HYPERTENSION: ICD-10-CM

## 2017-09-22 DIAGNOSIS — E11.9 DIABETES MELLITUS TYPE 2, NONINSULIN DEPENDENT (HCC): ICD-10-CM

## 2017-09-22 RX ORDER — LOSARTAN POTASSIUM 50 MG/1
TABLET ORAL
Qty: 30 TAB | Refills: 1 | Status: SHIPPED | OUTPATIENT
Start: 2017-09-22 | End: 2017-11-21 | Stop reason: SDUPTHER

## 2017-09-22 RX ORDER — SOLIFENACIN SUCCINATE 10 MG/1
TABLET, FILM COATED ORAL
Qty: 30 TAB | Refills: 1 | Status: SHIPPED | OUTPATIENT
Start: 2017-09-22 | End: 2017-11-21 | Stop reason: SDUPTHER

## 2017-11-21 NOTE — TELEPHONE ENCOUNTER
Pharmacy on file verified.    .  Requested Prescriptions     Pending Prescriptions Disp Refills    metFORMIN ER (GLUCOPHAGE XR) 500 mg tablet 90 Tab 2

## 2017-11-24 ENCOUNTER — TELEPHONE (OUTPATIENT)
Dept: FAMILY MEDICINE CLINIC | Age: 67
End: 2017-11-24

## 2017-11-24 RX ORDER — SOLIFENACIN SUCCINATE 10 MG/1
TABLET, FILM COATED ORAL
Qty: 30 TAB | Refills: 5 | Status: CANCELLED | OUTPATIENT
Start: 2017-11-24

## 2017-11-24 RX ORDER — METFORMIN HYDROCHLORIDE 500 MG/1
TABLET, EXTENDED RELEASE ORAL
Qty: 90 TAB | Refills: 1 | Status: SHIPPED | OUTPATIENT
Start: 2017-11-24 | End: 2018-05-20 | Stop reason: SDUPTHER

## 2017-11-24 RX ORDER — METOPROLOL TARTRATE 25 MG/1
TABLET, FILM COATED ORAL
Qty: 180 TAB | Refills: 3 | Status: CANCELLED | OUTPATIENT
Start: 2017-11-24

## 2017-11-24 NOTE — TELEPHONE ENCOUNTER
Requested Prescriptions     Pending Prescriptions Disp Refills    clopidogrel (PLAVIX) 75 mg tab 90 Tab 1     Sig: Take 1 Tab by mouth daily.

## 2017-11-24 NOTE — TELEPHONE ENCOUNTER
[facility]    [phone]        Refill request:   Requested Prescriptions     Pending Prescriptions Disp Refills    solifenacin (VESICARE) 10 mg tablet 30 Tab 5    metoprolol tartrate (LOPRESSOR) 25 mg tablet 180 Tab 3

## 2017-11-27 RX ORDER — CLOPIDOGREL BISULFATE 75 MG/1
75 TABLET ORAL DAILY
Qty: 90 TAB | Refills: 1 | Status: SHIPPED | OUTPATIENT
Start: 2017-11-27 | End: 2018-05-20 | Stop reason: SDUPTHER

## 2017-11-27 RX ORDER — CLOPIDOGREL BISULFATE 75 MG/1
75 TABLET ORAL DAILY
Qty: 90 TAB | Refills: 1 | OUTPATIENT
Start: 2017-11-27

## 2017-11-27 NOTE — TELEPHONE ENCOUNTER
----- Message from Queta Bhatti sent at 11/27/2017  3:03 PM EST -----  Regarding: Prescription Question  Contact: [phone]  Dr Deb Millan office called about renewal request for Clopidogrel 75 mg -1 per day- and said Dr Piotr Cote should take over. [facility].  x

## 2017-11-28 RX ORDER — METFORMIN HYDROCHLORIDE 500 MG/1
TABLET, EXTENDED RELEASE ORAL
Qty: 90 TAB | Refills: 1 | Status: CANCELLED | OUTPATIENT
Start: 2017-11-28

## 2017-11-28 RX ORDER — METOPROLOL TARTRATE 25 MG/1
TABLET, FILM COATED ORAL
Qty: 180 TAB | Refills: 1 | Status: SHIPPED | OUTPATIENT
Start: 2017-11-28 | End: 2019-02-14 | Stop reason: SDUPTHER

## 2017-11-28 NOTE — TELEPHONE ENCOUNTER
Pharmacy on file verified. Pharmacy sent a fax  .   Requested Prescriptions     Pending Prescriptions Disp Refills    metoprolol tartrate (LOPRESSOR) 25 mg tablet 180 Tab 3    metFORMIN ER (GLUCOPHAGE XR) 500 mg tablet 90 Tab 1     Sig: TAKE 1 TABLET ONCE DAILY WITH DINNER

## 2018-02-05 ENCOUNTER — OFFICE VISIT (OUTPATIENT)
Dept: FAMILY MEDICINE CLINIC | Age: 68
End: 2018-02-05

## 2018-02-05 VITALS
WEIGHT: 246.6 LBS | DIASTOLIC BLOOD PRESSURE: 90 MMHG | OXYGEN SATURATION: 96 % | SYSTOLIC BLOOD PRESSURE: 158 MMHG | BODY MASS INDEX: 41.09 KG/M2 | TEMPERATURE: 98.6 F | HEIGHT: 65 IN | RESPIRATION RATE: 18 BRPM | HEART RATE: 72 BPM

## 2018-02-05 DIAGNOSIS — Z01.818 PREOP EXAMINATION: ICD-10-CM

## 2018-02-05 DIAGNOSIS — I10 BENIGN ESSENTIAL HYPERTENSION: ICD-10-CM

## 2018-02-05 DIAGNOSIS — E66.01 OBESITY, MORBID (HCC): ICD-10-CM

## 2018-02-05 DIAGNOSIS — G45.0 VERTEBROBASILAR ARTERY SYNDROME: ICD-10-CM

## 2018-02-05 DIAGNOSIS — E11.9 DIABETES MELLITUS TYPE 2, NONINSULIN DEPENDENT (HCC): ICD-10-CM

## 2018-02-05 DIAGNOSIS — Z86.73 HISTORY OF TIA (TRANSIENT ISCHEMIC ATTACK): ICD-10-CM

## 2018-02-05 DIAGNOSIS — E78.00 HYPERCHOLESTEROLEMIA: ICD-10-CM

## 2018-02-05 DIAGNOSIS — H26.9 CATARACT OF BOTH EYES, UNSPECIFIED CATARACT TYPE: Primary | ICD-10-CM

## 2018-02-05 RX ORDER — PREDNISOLONE ACETATE 10 MG/ML
SUSPENSION/ DROPS OPHTHALMIC
Refills: 1 | COMMUNITY
Start: 2018-02-03 | End: 2018-05-07 | Stop reason: ALTCHOICE

## 2018-02-05 RX ORDER — LOSARTAN POTASSIUM 100 MG/1
100 TABLET ORAL DAILY
Qty: 90 TAB | Refills: 0 | Status: SHIPPED | OUTPATIENT
Start: 2018-02-05 | End: 2018-05-20 | Stop reason: SDUPTHER

## 2018-02-05 RX ORDER — OFLOXACIN 3 MG/ML
SOLUTION/ DROPS OPHTHALMIC
Refills: 1 | COMMUNITY
Start: 2018-02-03 | End: 2018-05-07 | Stop reason: ALTCHOICE

## 2018-02-05 NOTE — PATIENT INSTRUCTIONS
Body Mass Index: Care Instructions  Your Care Instructions    Body mass index (BMI) can help you see if your weight is raising your risk for health problems. It uses a formula to compare how much you weigh with how tall you are. · A BMI lower than 18.5 is considered underweight. · A BMI between 18.5 and 24.9 is considered healthy. · A BMI between 25 and 29.9 is considered overweight. A BMI of 30 or higher is considered obese. If your BMI is in the normal range, it means that you have a lower risk for weight-related health problems. If your BMI is in the overweight or obese range, you may be at increased risk for weight-related health problems, such as high blood pressure, heart disease, stroke, arthritis or joint pain, and diabetes. If your BMI is in the underweight range, you may be at increased risk for health problems such as fatigue, lower protection (immunity) against illness, muscle loss, bone loss, hair loss, and hormone problems. BMI is just one measure of your risk for weight-related health problems. You may be at higher risk for health problems if you are not active, you eat an unhealthy diet, or you drink too much alcohol or use tobacco products. Follow-up care is a key part of your treatment and safety. Be sure to make and go to all appointments, and call your doctor if you are having problems. It's also a good idea to know your test results and keep a list of the medicines you take. How can you care for yourself at home? · Practice healthy eating habits. This includes eating plenty of fruits, vegetables, whole grains, lean protein, and low-fat dairy. · If your doctor recommends it, get more exercise. Walking is a good choice. Bit by bit, increase the amount you walk every day. Try for at least 30 minutes on most days of the week. · Do not smoke. Smoking can increase your risk for health problems. If you need help quitting, talk to your doctor about stop-smoking programs and medicines. These can increase your chances of quitting for good. · Limit alcohol to 2 drinks a day for men and 1 drink a day for women. Too much alcohol can cause health problems. If you have a BMI higher than 25  · Your doctor may do other tests to check your risk for weight-related health problems. This may include measuring the distance around your waist. A waist measurement of more than 40 inches in men or 35 inches in women can increase the risk of weight-related health problems. · Talk with your doctor about steps you can take to stay healthy or improve your health. You may need to make lifestyle changes to lose weight and stay healthy, such as changing your diet and getting regular exercise. If you have a BMI lower than 18.5  · Your doctor may do other tests to check your risk for health problems. · Talk with your doctor about steps you can take to stay healthy or improve your health. You may need to make lifestyle changes to gain or maintain weight and stay healthy, such as getting more healthy foods in your diet and doing exercises to build muscle. Where can you learn more? Go to http://malcom-sav.info/. Enter S176 in the search box to learn more about \"Body Mass Index: Care Instructions. \"  Current as of: October 13, 2016  Content Version: 11.4  © 0055-1696 [employer]. Care instructions adapted under license by [employer] (which disclaims liability or warranty for this information). If you have questions about a medical condition or this instruction, always ask your healthcare professional. [address] any warranty or liability for your use of this information. Learning About Diabetes Food Guidelines  Your Care Instructions    Meal planning is important to manage diabetes. It helps keep your blood sugar at a target level (which you set with your doctor). You don't have to eat special foods.  You can eat what your family eats, including sweets once in a while. But you do have to pay attention to how often you eat and how much you eat of certain foods. You may want to work with a dietitian or a certified diabetes educator (CDE) to help you plan meals and snacks. A dietitian or CDE can also help you lose weight if that is one of your goals. What should you know about eating carbs? Managing the amount of carbohydrate (carbs) you eat is an important part of healthy meals when you have diabetes. Carbohydrate is found in many foods. · Learn which foods have carbs. And learn the amounts of carbs in different foods. ¨ Bread, cereal, pasta, and rice have about 15 grams of carbs in a serving. A serving is 1 slice of bread (1 ounce), ½ cup of cooked cereal, or 1/3 cup of cooked pasta or rice. ¨ Fruits have 15 grams of carbs in a serving. A serving is 1 small fresh fruit, such as an apple or orange; ½ of a banana; ½ cup of cooked or canned fruit; ½ cup of fruit juice; 1 cup of melon or raspberries; or 2 tablespoons of dried fruit. ¨ Milk and no-sugar-added yogurt have 15 grams of carbs in a serving. A serving is 1 cup of milk or 2/3 cup of no-sugar-added yogurt. ¨ Starchy vegetables have 15 grams of carbs in a serving. A serving is ½ cup of mashed potatoes or sweet potato; 1 cup winter squash; ½ of a small baked potato; ½ cup of cooked beans; or ½ cup cooked corn or green peas. · Learn how much carbs to eat each day and at each meal. A dietitian or CDE can teach you how to keep track of the amount of carbs you eat. This is called carbohydrate counting. · If you are not sure how to count carbohydrate grams, use the Plate Method to plan meals. It is a good, quick way to make sure that you have a balanced meal. It also helps you spread carbs throughout the day. ¨ Divide your plate by types of foods.  Put non-starchy vegetables on half the plate, meat or other protein food on one-quarter of the plate, and a grain or starchy vegetable in the final quarter of the plate. To this you can add a small piece of fruit and 1 cup of milk or yogurt, depending on how many carbs you are supposed to eat at a meal.  · Try to eat about the same amount of carbs at each meal. Do not \"save up\" your daily allowance of carbs to eat at one meal.  · Proteins have very little or no carbs per serving. Examples of proteins are beef, chicken, turkey, fish, eggs, tofu, cheese, cottage cheese, and peanut butter. A serving size of meat is 3 ounces, which is about the size of a deck of cards. Examples of meat substitute serving sizes (equal to 1 ounce of meat) are 1/4 cup of cottage cheese, 1 egg, 1 tablespoon of peanut butter, and ½ cup of tofu. How can you eat out and still eat healthy? · Learn to estimate the serving sizes of foods that have carbohydrate. If you measure food at home, it will be easier to estimate the amount in a serving of restaurant food. · If the meal you order has too much carbohydrate (such as potatoes, corn, or baked beans), ask to have a low-carbohydrate food instead. Ask for a salad or green vegetables. · If you use insulin, check your blood sugar before and after eating out to help you plan how much to eat in the future. · If you eat more carbohydrate at a meal than you had planned, take a walk or do other exercise. This will help lower your blood sugar. What else should you know? · Limit saturated fat, such as the fat from meat and dairy products. This is a healthy choice because people who have diabetes are at higher risk of heart disease. So choose lean cuts of meat and nonfat or low-fat dairy products. Use olive or canola oil instead of butter or shortening when cooking. · Don't skip meals. Your blood sugar may drop too low if you skip meals and take insulin or certain medicines for diabetes. · Check with your doctor before you drink alcohol. Alcohol can cause your blood sugar to drop too low.  Alcohol can also cause a bad reaction if you take certain diabetes medicines. Follow-up care is a key part of your treatment and safety. Be sure to make and go to all appointments, and call your doctor if you are having problems. It's also a good idea to know your test results and keep a list of the medicines you take. Where can you learn more? Go to http://malcom-sav.info/. Enter G036 in the search box to learn more about \"Learning About Diabetes Food Guidelines. \"  Current as of: March 13, 2017  Content Version: 11.4  © 7681-1260 [employer]. Care instructions adapted under license by [employer] (which disclaims liability or warranty for this information). If you have questions about a medical condition or this instruction, always ask your healthcare professional. [address] any warranty or liability for your use of this information.

## 2018-02-05 NOTE — PROGRESS NOTES
HISTORY OF PRESENT ILLNESS   HPI  PRE-OP H&P    Surgery: Cataracts, Bilateral    Date of Surgery: 2-14-18 OS, 2-21-18 OD    Surgeon: Dr. Anil Dunham    Anesthesia: Local    Latex Allergy: No    History of Reactions to Anesthesia: No    Follow up Diabetes, Hypertension, Hyperlipidemia   Patient has h/o type 2 non insulin diabetes, high cholesterol, hypertension and TIA in 2016. She was previously on asa plus Plavix therapy but at her last appt w/ Neuro Dr Shantell Caal this was transitioned to Plavix therapy only which pt will need to be on indefinitely. Patient has not had any recurrent events since then and has had no bleeding issues on Plavix. She is tolerating her current medication regimen well in general w/o any reactions or side effects. She does not check her BS's and admits she has not been very compliant w/ diet. She is not exercising regularly but does occasionally get to the [facility]. She does periodically check her BP's either at home or the grocery store and some of her readings have been as follows:  155/96, 136/70, 135/82, 167/88, 154/80, 153/87, 130/75, 134/74, 144/79, 141/80, 140/79. She feels well. REVIEW OF SYMPTOMS     Review of Systems   Constitutional: Negative. HENT:        She gets some dry mouth at bedtime, likely from [facility] but she feels the trade off it worth it   Respiratory: Negative. Negative for cough and shortness of breath. Cardiovascular: Negative. Negative for chest pain and palpitations. Gastrointestinal: Negative. Negative for blood in stool and melena. Genitourinary: Negative. Musculoskeletal: Negative for myalgias. Neurological: Negative for dizziness, tingling, sensory change, speech change, focal weakness and headaches.    Endo/Heme/Allergies: Does not bruise/bleed easily.           PROBLEM LIST/MEDICAL HISTORY      Problem List  Date Reviewed: 2/5/2018          Codes Class Noted    Obesity, morbid ([facility] ICD-10-CM: E66.01  ICD-9-CM: 278.01  2/5/2018 Cataracts, bilateral ICD-10-CM: H26.9  ICD-9-CM: 366.9  2/3/2017    Overview Addendum 8/28/2017  8:32 AM by Emanuel Corbin MD     R>L, Dr Tonny Porter, changing to Dr Yudith Nassar 2017             TIA (transient ischemic attack) ICD-10-CM: G45.9  ICD-9-CM: 435.9  11/18/2016    Overview Signed 11/18/2016 11:02 AM by Emanuel Corbin MD     [facility] 11/2/16-11/3/16, Neuro Dr Helen Sadler             Diabetes mellitus type 2, noninsulin dependent ([facility] ICD-10-CM: E11.9  ICD-9-CM: 250.00  11/18/2016    Overview Signed 11/18/2016  1:01 PM by Emanuel Corbin MD     10/2014             ACP (advance care planning) ICD-10-CM: Z71.89  ICD-9-CM: V65.49  8/2/2016    Overview Signed 8/2/2016  8:36 AM by Emanuel Corbin MD     Patient has an AMD             Benign essential hypertension ICD-10-CM: I10  ICD-9-CM: 401.1  8/2/2016        Bee sting allergy with anaphylaxis reaction ICD-10-CM: Z91.038  ICD-9-CM: V15.06  7/27/2015        Arthritis of right foot & ankle ICD-10-CM: M19.071  ICD-9-CM: 716.97  5/21/2015    Overview Signed 5/21/2015 10:38 AM by Emanuel Corbin MD     Ortho Dr Suzanne Jensen; used a boot x 2 months, off 2-2015             Right knee pain ICD-10-CM: M25.561  ICD-9-CM: 719.46  3/24/2014    Overview Signed 3/24/2014 11:45 AM by Emanuel Corbin MD     Jan 2014; CSI per Ortho Dr Tory Pleitez office             Hypercholesterolemia ICD-10-CM: E78.00  ICD-9-CM: 272.0  1/3/2014        Vertebrobasilar artery syndrome ICD-10-CM: G45.0  ICD-9-CM: 435.3  6/13/2013    Overview Addendum 6/21/2013 12:53 PM by Emanuel Corbin MD     ER; Neuro Dr Lianet Wilcox; dc'd on ASA, Zocor and Metoprolol             Impaired fasting glucose ICD-10-CM: R73.01  ICD-9-CM: 790.21  4/3/2012    Overview Signed 4/3/2012  3:09 PM by Emanuel Corbin MD     3/2012             Overactive bladder ICD-10-CM: E24.41  ICD-9-CM: 596.51  3/28/2011        S/P colonoscopy with polypectomy ICD-10-CM: S25.186  ICD-9-CM: V45.89  Unknown    Overview Signed 3/29/2010 12:47 PM by Monae Reed MD     benign polyp, f/u 5yrs; Dr. Starr MONTGOMERYVD (normal spontaneous vaginal delivery) ICD-10-CM: O80  ICD-9-CM: 0  Unknown    Overview Signed 3/29/2010 12:48 PM by Monae Reed MD     Marlo Rand, NSVDx3, SAB x2             S/P abdominal hysterectomy and left salpingo-oophorectomy ICD-10-CM: Z90.710, Z90.79, Z90.721  ICD-9-CM: V88.01, V45.77  Unknown    Overview Signed 3/29/2010 12:49 PM by Monae Reed MD     Uterine Fibroids; no HRT             S/P SCC excised from left hand ICD-10-CM: C44.621  ICD-9-CM: 173.62  Unknown    Overview Signed 3/29/2010 12:54 PM by Monae Reed MD     Lt hand, SCC excised, Dr. Poornima Richard             Postmenopausal, LMP 36 yo, No HRT history ICD-10-CM: Z78.0  ICD-9-CM: V49.81  3/18/2010        Osteopenia ICD-10-CM: M85.80  ICD-9-CM: 733.90  10/3/2007                  PAST SURGICAL HISTORY       Past Surgical History:   Procedure Laterality Date    COLONOSCOPY  2006    q5yrs; Dr Starr Mcneill; Diverticulosis, internal hemorrhoids, benign polyp    ECHO 2D ADULT      Normal    ELECTROCARDIOGRAM REPORT  ,     Normal    HM DEXA SCAN  10/2007    Osteopenia; spine -1.5, fem neck -1.1    HX CHOLECYSTECTOMY      HX COLONOSCOPY  2013, Dr Akiko Vazquez    Diverticulosis only, f/u 10 yrs    HX CYST REMOVAL Right     2 cyss removed from right middle finger    HX HYSTERECTOMY      USO; NO HRT    HX ORTHOPAEDIC      benign tumor removed from left wrist    HX ORTHOPAEDIC Right 16, Dr Nel Zee    right foot pin/plate    HX TONSILLECTOMY  1950    T&A    LAP,CHOLECYSTECTOMY      ND COLONOSCOPY FLX DX W/COLLJ [address] WHEN PFRMD  2008 , repeat 5 yrs    for rectal bleeding; hemorrhoids, diverticulae, small polyp         MEDICATIONS      Current Outpatient Prescriptions   Medication Sig    metoprolol tartrate (LOPRESSOR) 25 mg tablet TAKE 1 TABLET BY MOUTH EVERY 12 HOURS    clopidogrel (PLAVIX) 75 mg tab Take 1 Tab by mouth daily.  metFORMIN ER (GLUCOPHAGE XR) 500 mg tablet TAKE 1 TABLET ONCE DAILY WITH DINNER    losartan (COZAAR) 50 mg tablet TAKE ONE TABLET BY MOUTH ONE TIME DAILY    VESICARE 10 mg tablet TAKE ONE TABLET BY MOUTH ONE TIME DAILY    simvastatin (ZOCOR) 10 mg tablet TAKE 1 TABLET BY MOUTH EVERY EVENING    calcium-cholecalciferol, D3, (CALTRATE 600+D) tablet Take 1 Tab by mouth daily.  ACETAMINOPHEN/DIPHENHYDRAMINE (TYLENOL PM PO) Take  by mouth nightly as needed.  acetaminophen (TYLENOL) 650 mg CR tablet Take 1,300 mg by mouth daily as needed for Pain.  EPIPEN 2-MONICA 0.3 mg/0.3 mL (1:1,000) injection USE IMMEDIATELY AS NEEDED FOR ALLERGIC REACTION TO BEE STING    L. acidoph & paracasei- S therm- Bifido (JULIETA-Q) 8 billion cell cap cap Take 1 Cap by mouth daily.  vitamin e 400 unit Tab Take  by mouth daily.  multivitamins-minerals-lutein (CENTRUM SILVER) Tab Take 1 Tab by mouth daily.  ofloxacin (FLOXIN) 0.3 % ophthalmic solution INSTILL ONE DROP TO THE AFFECTED EYE(S) FOUR TIMES A DAY INTO SURGERY EYE (START 2 DAYS BEFORE SURGERY)    prednisoLONE acetate (PRED FORTE) 1 % ophthalmic suspension INSTILL ONE DROP TO THE AFFECTED EYE(S) FOUR TIMES A DAY INTO SURGERY EYE (TAPER AS DIRECTED)     No current facility-administered medications for this visit.            ALLERGIES     Allergies   Allergen Reactions    Oxycodone Hives, Nausea Only and Other (comments)    Ace Inhibitors Cough    Bee Sting [Sting, Bee] Swelling    Fish Oil Other (comments)     GI upset    Lisinopril Cough    Motrin [Ibuprofen] Swelling and Myalgia    Oxycodone-Acetaminophen Nausea and Vomiting    Pcn [Penicillins] Swelling    Promethazine-Dm Other (comments)     HA, nightmares          SOCIAL HISTORY       Social History     Social History    Marital status:      Spouse name: N/A    Number of children: 3    Years of education: N/A     Occupational History    Fully Retired 2008    Alexia Zuluaga her grandchildren 3 x a week      Retired     Social History Main Topics    Smoking status: Never Smoker    Smokeless tobacco: Never Used    Alcohol use 1.0 oz/week     2 Glasses of wine per week      Comment: 2 glasses of wine per week    Drug use: No    Sexual activity: Yes     Partners: Male     Other Topics Concern     Service No    Blood Transfusions No    Caffeine Concern No     2 cups of coffee qam    Occupational Exposure No    Hobby Hazards No    Sleep Concern No    Stress Concern No    Weight Concern No    Special Diet No    Back Care No    Exercise No    Bike Helmet Yes    Seat Belt Yes    Self-Exams Yes     Social History Narrative    Patient has an AMD        IMMUNIZATIONS  Immunization History   Administered Date(s) Administered    Influenza High Dose Vaccine PF 2016, 2017    Influenza Vaccine 2015    Influenza Vaccine Split 10/01/2012    Pneumococcal Polysaccharide (PPSV-23) 2014    TD Vaccine 1999    TDAP Vaccine 2009    Td, Adsorbed PF 2015    Varicella Virus Vaccine Live 10/01/2011    ZZZ-RETIRED (DO NOT USE) Pneumococcal Vaccine (Unspecified Type) 2009    Zoster 2011         FAMILY HISTORY     Family History   Problem Relation Age of Onset    Thyroid Disease Mother      h/o Grave's dz;  age 81yo   [facility] Osteoporosis Mother    [facility] Delayed Awakening Mother    [facility] Stroke Father       age 80    Other Father      AAA age 76, macular degeneration    Alcohol abuse Father      smoker and drinker    Osteoporosis Sister     Other Son      gout    Gout Son     Dementia Maternal Grandmother      dementia and mental illness    Other Brother      heart murmur    Liver Disease Brother      hepatitis c    Anesth Problems Neg Hx          VITALS     Visit Vitals    /90 (BP 1 Location: Left arm, BP Patient Position: Sitting)    Pulse 72    Temp 98.6 °F (37 °C) (Oral)    Resp 18    Ht 5' 5\" (1.651 m)    Wt 246 lb 9.6 oz (111.9 kg)    SpO2 96%    BMI 41.04 kg/m2          PHYSICAL EXAMINATION     Physical Exam   Constitutional: She is oriented to person, place, and time. No distress. HENT:   Right Ear: Tympanic membrane normal.   Left Ear: Tympanic membrane normal.   Nose: Nose normal.   Mouth/Throat: Oropharynx is clear and moist. No oropharyngeal exudate. Eyes: Conjunctivae and EOM are normal. No scleral icterus. Neck: Neck supple. No JVD present. Carotid bruit is not present. No thyromegaly present. Cardiovascular: Normal rate, regular rhythm and normal heart sounds. Exam reveals no gallop and no friction rub. No murmur heard. Pulmonary/Chest: Effort normal and breath sounds normal. No respiratory distress. Abdominal: Soft. Bowel sounds are normal. There is no tenderness. Obese     Musculoskeletal: She exhibits no edema or tenderness. Lymphadenopathy:     She has no cervical adenopathy. Neurological: She is alert and oriented to person, place, and time. Gait normal.   Skin: Skin is warm and dry.    Psychiatric: Mood and affect normal.   Vitals reviewed.          DIAGNOSTIC DATA         LABORATORY DATA       Lab Results  Component Value Date/Time   WBC 8.6 11/02/2016 12:02 PM   HGB 13.6 11/02/2016 12:02 PM   HCT 42.7 11/02/2016 12:02 PM   PLATELET 045 03/26/9402 12:02 PM   MCV 89.5 11/02/2016 12:02 PM     Lab Results  Component Value Date/Time   Hemoglobin A1c 6.1 09/01/2017 08:26 AM   Hemoglobin A1c 6.2 02/03/2017 09:22 AM   Hemoglobin A1c 6.5 11/02/2016 12:02 PM   Glucose 109 09/01/2017 08:26 AM   Glucose (POC) 116 11/03/2016 11:52 AM   Microalb/Creat ratio (ug/mg creat.) 4.8 02/03/2017 09:22 AM   LDL, calculated 68 09/01/2017 08:26 AM   Creatinine 0.64 09/01/2017 08:26 AM      Lab Results  Component Value Date/Time   Cholesterol, total 138 09/01/2017 08:26 AM   HDL Cholesterol 43 09/01/2017 08:26 AM   LDL, calculated 68 09/01/2017 08:26 AM   Triglyceride 133 09/01/2017 08:26 AM   CHOL/HDL Ratio 3.5 11/02/2016 12:02 PM     Lab Results  Component Value Date/Time   TSH 1.310 02/03/2017 09:22 AM      Lab Results   Component Value Date/Time    Sodium 141 09/01/2017 08:26 AM    Potassium 4.3 09/01/2017 08:26 AM    Chloride 100 09/01/2017 08:26 AM    CO2 26 09/01/2017 08:26 AM    Anion gap 5 11/02/2016 12:02 PM    Glucose 109 09/01/2017 08:26 AM    BUN 16 09/01/2017 08:26 AM    Creatinine 0.64 09/01/2017 08:26 AM    BUN/Creatinine ratio 25 09/01/2017 08:26 AM    GFR est  09/01/2017 08:26 AM    GFR est non-AA 91 09/01/2017 08:26 AM    Calcium 9.6 09/01/2017 08:26 AM    Bilirubin, total 0.5 11/02/2016 12:02 PM    ALT (SGPT) 14 09/01/2017 08:26 AM    AST (SGOT) 15 09/01/2017 08:26 AM    Alk. phosphatase 116 11/02/2016 12:02 PM    Protein, total 6.9 11/02/2016 12:02 PM    Albumin 3.4 11/02/2016 12:02 PM    Globulin 3.5 11/02/2016 12:02 PM    A-G Ratio 1.0 11/02/2016 12:02 PM      Lab Results   Component Value Date/Time    Hemoglobin A1c 6.1 09/01/2017 08:26 AM          ASSESSMENT & PLAN   Diagnoses and all orders for this visit:    1. Cataract of both eyes, unspecified cataract type    2. Preop examination: Patient deemed medically stable and clear for cataract surgery per Dr Vanessa Alejandro under local, OS 2/14/2018, OD 2/21/18. Patient has h/o TIA and is on Plavix daily. I consulted w/ her Neuro Dr Trudy Cain today via phone. Discussed and shared w/ pt that there is higher risk of surgical bleeding on Plavix w/ otherwise low bleeding associated w/ cataract surgery. In light of this, would recommend pt with hold the Plavix x 7 days prior to surgery and have her take 81 mg asa therapy during that time period instead. There is a possible but low risk of TIA recurrence during that brief time of being off the Plavix  and on asa therapy alone. This was d/w pt at length today.  Patient expresses understanding w/ A/P and associated risks, though low. She will stop Plavix 2-7-18 and start asa therapy that day. Will resume Plavix 2-22-18 in place of the asa after that  time. 3. Diabetes mellitus type 2, noninsulin dependent ([facility]  Assessment & Plan:  Stable, based on history, physical exam and review of pertinent labs, studies and medications; meds reconciled; continue current treatment plan, lifestyle modifications recommended. Specific diabetic recommendations: diabetic diet discussed in detail, written exchange diet given and long term diabetic complications discussed  Key Antihyperglycemic Medications             metFORMIN ER (GLUCOPHAGE XR) 500 mg tablet  (Taking) TAKE 1 TABLET ONCE DAILY WITH DINNER        Other Key Diabetic Medications             losartan (COZAAR) 50 mg tablet  (Taking) TAKE ONE TABLET BY MOUTH ONE TIME DAILY: INCREASE  MG DAILY    simvastatin (ZOCOR) 10 mg tablet  (Taking) TAKE 1 TABLET BY MOUTH EVERY EVENING        Lab Results   Component Value Date/Time    Hemoglobin A1c 6.1 09/01/2017 08:26 AM    Glucose 109 09/01/2017 08:26 AM    Creatinine 0.64 09/01/2017 08:26 AM    Microalb/Creat ratio (ug/mg creat.) 4.8 02/03/2017 09:22 AM    Cholesterol, total 138 09/01/2017 08:26 AM    HDL Cholesterol 43 09/01/2017 08:26 AM    LDL, calculated 68 09/01/2017 08:26 AM    Triglyceride 133 09/01/2017 08:26 AM     Diabetic Foot and Eye Exam HM Status   Topic Date Due    Eye Exam  Up to date per Dr. Andrea Schneider Diabetic Foot Care  08/28/2018       Orders:  -     losartan (COZAAR) 100 mg tablet; Take 1 Tab by mouth daily. 4. Benign essential hypertension, not at diabetic goal.   Increase Cozaar from 50 to 100 mg daily w/ continued interim BP monitoring  -     losartan (COZAAR) 100 mg tablet; Take 1 Tab by mouth daily. 5. Hypercholesterolemia, maintained on Zocor 10 mg daily. Not fasting today. Cardiovascular risk and specific lipid/LDL goals reviewed    6.  Obesity, morbid ([facility]  Assessment & Plan:  Uncontrolled, based on history, physical exam and review of pertinent labs, studies and medications; meds reconciled; lifestyle modifications recommended. Patient counseled about current BMI, goals, diet, nutrition, exercise, weight management. Nutrition/meal planning discussed. Monitor weights. Appropriate patient education materials included with or without corresponding AVS via handout or MyChart if activated. Reassess at next follow up visit. Key Obesity Meds             metFORMIN ER (GLUCOPHAGE XR) 500 mg tablet  (Taking) TAKE 1 TABLET ONCE DAILY WITH DINNER            7. History of TIA (transient ischemic attack): Vertebrobasilar artery syndrome  Assessment & Plan:  Stable, based on history, physical exam and review of pertinent labs, studies and medications; meds reconciled; continue current treatment plan. [facility] 11/2/16-11/3/16, Neuro Dr Lilliam Davis Anti-Platelet Anticoagulant Meds             clopidogrel (PLAVIX) 75 mg tab  (Taking) Take 1 Tab by mouth daily. Key CAD CHF Meds             metoprolol tartrate (LOPRESSOR) 25 mg tablet  (Taking) TAKE 1 TABLET BY MOUTH EVERY 12 HOURS    clopidogrel (PLAVIX) 75 mg tab  (Taking) Take 1 Tab by mouth daily.     losartan (COZAAR) 50 mg tablet  (Taking) TAKE ONE TABLET BY MOUTH ONE TIME DAILY: Increase to 100 mg qd        Key Antihyperlipidemia Meds             simvastatin (ZOCOR) 10 mg tablet  (Taking) TAKE 1 TABLET BY MOUTH EVERY EVENING            Fasting follow up in 3 months, sooner prn            CC: Dr. Charly Vincent, [facility] [phone]

## 2018-02-05 NOTE — ASSESSMENT & PLAN NOTE
Stable, based on history, physical exam and review of pertinent labs, studies and medications; meds reconciled; continue current treatment plan, lifestyle modifications recommended.   Key Antihyperglycemic Medications             metFORMIN ER (GLUCOPHAGE XR) 500 mg tablet  (Taking) TAKE 1 TABLET ONCE DAILY WITH DINNER        Other Key Diabetic Medications             losartan (COZAAR) 50 mg tablet  (Taking) TAKE ONE TABLET BY MOUTH ONE TIME DAILY    simvastatin (ZOCOR) 10 mg tablet  (Taking) TAKE 1 TABLET BY MOUTH EVERY EVENING        Lab Results   Component Value Date/Time    Hemoglobin A1c 6.1 09/01/2017 08:26 AM    Glucose 109 09/01/2017 08:26 AM    Creatinine 0.64 09/01/2017 08:26 AM    Microalb/Creat ratio (ug/mg creat.) 4.8 02/03/2017 09:22 AM    Cholesterol, total 138 09/01/2017 08:26 AM    HDL Cholesterol 43 09/01/2017 08:26 AM    LDL, calculated 68 09/01/2017 08:26 AM    Triglyceride 133 09/01/2017 08:26 AM     Diabetic Foot and Eye Exam HM Status   Topic Date Due    Eye Exam  Up to date per Dr. Delvis Echeverria Diabetic Foot Care  08/28/2018

## 2018-02-05 NOTE — ASSESSMENT & PLAN NOTE
Uncontrolled, based on history, physical exam and review of pertinent labs, studies and medications; meds reconciled; lifestyle modifications recommended.   Key Obesity Meds             metFORMIN ER (GLUCOPHAGE XR) 500 mg tablet  (Taking) TAKE 1 TABLET ONCE DAILY WITH DINNER        Lab Results   Component Value Date/Time    Hemoglobin A1c 6.1 09/01/2017 08:26 AM    Glucose 109 09/01/2017 08:26 AM    Cholesterol, total 138 09/01/2017 08:26 AM    HDL Cholesterol 43 09/01/2017 08:26 AM    LDL, calculated 68 09/01/2017 08:26 AM    Triglyceride 133 09/01/2017 08:26 AM    TSH 1.310 02/03/2017 09:22 AM    Sodium 141 09/01/2017 08:26 AM    Potassium 4.3 09/01/2017 08:26 AM    ALT (SGPT) 14 09/01/2017 08:26 AM    AST (SGOT) 15 09/01/2017 08:26 AM

## 2018-02-05 NOTE — PROGRESS NOTES
Chief Complaint   Patient presents with    Pre-op Exam     cataract -OS 2/14/18 and OD on 2/21/18   1. Have you been to the ER, urgent care clinic since your last visit? Hospitalized since your last visit? No    2. Have you seen or consulted any other health care providers outside of the [address] [place] [address] since your last visit? Include any pap smears or colon screening.    Yes Dr [address]

## 2018-02-05 NOTE — ASSESSMENT & PLAN NOTE
Stable, based on history, physical exam and review of pertinent labs, studies and medications; meds reconciled; continue current treatment plan. [facility] 11/2/16-11/3/16, Neuro Dr Jasmine Wiley Anti-Platelet Anticoagulant Meds             clopidogrel (PLAVIX) 75 mg tab  (Taking) Take 1 Tab by mouth daily. Key CAD CHF Meds             metoprolol tartrate (LOPRESSOR) 25 mg tablet  (Taking) TAKE 1 TABLET BY MOUTH EVERY 12 HOURS    clopidogrel (PLAVIX) 75 mg tab  (Taking) Take 1 Tab by mouth daily.     losartan (COZAAR) 50 mg tablet  (Taking) TAKE ONE TABLET BY MOUTH ONE TIME DAILY        [place] Antihyperlipidemia Meds             simvastatin (ZOCOR) 10 mg tablet  (Taking) TAKE 1 TABLET BY MOUTH EVERY EVENING        Lab Results  Component Value Date/Time   WBC 8.6 11/02/2016 12:02 PM   HGB 13.6 11/02/2016 12:02 PM   HCT 42.7 11/02/2016 12:02 PM   PLATELET 568 10/24/5144 12:02 PM   MCV 89.5 11/02/2016 12:02 PM     Lab Results  Component Value Date/Time   INR 0.9 11/02/2016 12:02 PM   Prothrombin time 9.4 11/02/2016 12:02 PM

## 2018-05-07 ENCOUNTER — HOSPITAL ENCOUNTER (OUTPATIENT)
Dept: LAB | Age: 68
Discharge: HOME | End: 2018-05-07
Payer: MEDICARE

## 2018-05-07 ENCOUNTER — OFFICE VISIT (OUTPATIENT)
Dept: FAMILY MEDICINE CLINIC | Age: 68
End: 2018-05-07

## 2018-05-07 VITALS
WEIGHT: 243.4 LBS | HEART RATE: 63 BPM | OXYGEN SATURATION: 96 % | BODY MASS INDEX: 40.55 KG/M2 | TEMPERATURE: 98.1 F | RESPIRATION RATE: 18 BRPM | DIASTOLIC BLOOD PRESSURE: 88 MMHG | HEIGHT: 65 IN | SYSTOLIC BLOOD PRESSURE: 136 MMHG

## 2018-05-07 DIAGNOSIS — I10 BENIGN ESSENTIAL HYPERTENSION: ICD-10-CM

## 2018-05-07 DIAGNOSIS — E11.9 DIABETES MELLITUS TYPE 2, NONINSULIN DEPENDENT (HCC): Primary | ICD-10-CM

## 2018-05-07 DIAGNOSIS — E78.00 HYPERCHOLESTEROLEMIA: ICD-10-CM

## 2018-05-07 PROCEDURE — 82043 UR ALBUMIN QUANTITATIVE: CPT

## 2018-05-07 PROCEDURE — 80048 BASIC METABOLIC PNL TOTAL CA: CPT

## 2018-05-07 PROCEDURE — 84450 TRANSFERASE (AST) (SGOT): CPT

## 2018-05-07 PROCEDURE — 80061 LIPID PANEL: CPT

## 2018-05-07 PROCEDURE — 83036 HEMOGLOBIN GLYCOSYLATED A1C: CPT

## 2018-05-07 PROCEDURE — 85027 COMPLETE CBC AUTOMATED: CPT

## 2018-05-07 PROCEDURE — 84460 ALANINE AMINO (ALT) (SGPT): CPT

## 2018-05-07 PROCEDURE — 84443 ASSAY THYROID STIM HORMONE: CPT

## 2018-05-07 PROCEDURE — 36415 COLL VENOUS BLD VENIPUNCTURE: CPT

## 2018-05-07 NOTE — PROGRESS NOTES
Exam Room [address] is a 77 y.o. female  Chief Complaint   Patient presents with    Diabetes     3 month f/u fasting    Cholesterol Problem     3 month f/u    Hypertension    Labs     Patient is fasting       1. Have you been to the ER, urgent care clinic since your last visit? Hospitalized since your last visit? No    2. Have you seen or consulted any other health care providers outside of the [address] [place] [address] since your last visit? Include any pap smears or colon screening.  No    Health Maintenance Due   Topic Date Due    MICROALBUMIN Q1  02/03/2018    HEMOGLOBIN A1C Q6M  03/01/2018

## 2018-05-07 NOTE — PATIENT INSTRUCTIONS

## 2018-05-07 NOTE — PROGRESS NOTES
HISTORY OF PRESENT ILLNESS   HPI  Fasting follow up diabetes, hypercholesterolemia, hypertension, labs and medication check. Overall has been getting along well and feeling well in general.  She has been increasing her daily steps guided by her Fit Bit, gets ~ 10k steps 5 x a week. Admits she could do better w/ her eating habits but making small modifications here and there. Her wt is down 10 lbs since 8-2017. She would like to lose 30 more lbs. Does not check BS's at home, personal preference. BP's have been good and stable. REVIEW OF SYMPTOMS     Review of Systems   Constitutional: Negative. Eyes: Negative. Respiratory: Negative. Cardiovascular: Negative. Gastrointestinal: Negative. Genitourinary: Negative.     Musculoskeletal: Negative for myalgias.           PROBLEM LIST/MEDICAL HISTORY      Problem List  Date Reviewed: 5/7/2018          Codes Class Noted    Obesity, morbid ([facility] ICD-10-CM: E66.01  ICD-9-CM: 278.01  2/5/2018        Cataracts, bilateral ICD-10-CM: H26.9  ICD-9-CM: 366.9  2/3/2017    Overview Addendum 8/28/2017  8:32 AM by Ofe Apple MD     R>L, Dr Mallie Mcardle, changing to Dr Jaqueline Moreira Fall 2017             TIA (transient ischemic attack) ICD-10-CM: G45.9  ICD-9-CM: 435.9  11/18/2016    Overview Signed 11/18/2016 11:02 AM by Ofe Apple MD     [facility] 11/2/16-11/3/16, Neuro Dr M[facility]             Diabetes mellitus type 2, noninsulin dependent ([facility] ICD-10-CM: E11.9  ICD-9-CM: 250.00  11/18/2016    Overview Signed 11/18/2016  1:01 PM by Ofe Apple MD     10/2014             ACP (advance care planning) ICD-10-CM: Z71.89  ICD-9-CM: V65.49  8/2/2016    Overview Signed 8/2/2016  8:36 AM by Ofe Apple MD     Patient has an AMD             Benign essential hypertension ICD-10-CM: I10  ICD-9-CM: 401.1  8/2/2016        Bee sting allergy with anaphylaxis reaction ICD-10-CM: K92.593  ICD-9-CM: V15.06  7/27/2015        Arthritis of right foot & ankle ICD-10-CM: M19.071  ICD-9-CM: 716.97  2015    Overview Signed 2015 10:38 AM by Sun Triplett MD     Ortho Dr Cuca Gross; used a boot x 2 months, off              Right knee pain ICD-10-CM: M25.561  ICD-9-CM: 719.46  3/24/2014    Overview Signed 3/24/2014 11:45 AM by Sun Triplett MD     2014; CSI per Ortho Dr Teresa Jordan office             Hypercholesterolemia ICD-10-CM: E78.00  ICD-9-CM: 272.0  1/3/2014        Vertebrobasilar artery syndrome ICD-10-CM: G45.0  ICD-9-CM: 435.3  2013    Overview Addendum 2013 12:53 PM by Sun Triplett MD     ER; Neuro Dr Joe Hernandez; dc'd on ASA, Zocor and Metoprolol             Impaired fasting glucose ICD-10-CM: R73.01  ICD-9-CM: 790.21  4/3/2012    Overview Signed 4/3/2012  3:09 PM by Sun Triplett MD     3/2012             Overactive bladder ICD-10-CM: X63.82  ICD-9-CM: 596.51  3/28/2011        S/P colonoscopy with polypectomy ICD-10-CM: K58.485  ICD-9-CM: V45.89  Unknown    Overview Signed 3/29/2010 12:47 PM by Sun Triplett MD     benign polyp, f/u 5yrs; Dr. Patria MONTGOMERYVD (normal spontaneous vaginal delivery) ICD-10-CM: O80  ICD-9-CM: 0  Unknown    Overview Signed 3/29/2010 12:48 PM by Sun Triplett MD     A2, NSVDx3, SAB x2             S/P abdominal hysterectomy and left salpingo-oophorectomy ICD-10-CM: Z90.710, Z90.79, Z90.721  ICD-9-CM: V88.01, V45.77  Unknown    Overview Signed 3/29/2010 12:49 PM by Sun Triplett MD     Uterine Fibroids; no HRT             S/P SCC excised from left hand ICD-10-CM: N12.355  ICD-9-CM: 173.62  Unknown    Overview Signed 3/29/2010 12:54 PM by Sun Triplett MD     Lt hand, SCC excised, Dr. Kumar Del             Postmenopausal, LMP 38 yo, No HRT history ICD-10-CM: Z78.0  ICD-9-CM: V49.81  3/18/2010        Osteopenia ICD-10-CM: M85.80  ICD-9-CM: 733.90  10/3/2007                  PAST SURGICAL HISTORY       Past Surgical History: Procedure Laterality Date    COLONOSCOPY  1/2006    q5yrs; Dr Patria Angel; Diverticulosis, internal hemorrhoids, benign polyp    ECHO 2D ADULT  1997    Normal    ELECTROCARDIOGRAM REPORT  2010, 2009    Normal    HM DEXA SCAN  10/2007    Osteopenia; spine -1.5, fem neck -1.1    HX CATARACT REMOVAL Bilateral 2/15/2018 and 2/22/2018    [facility]    HX CHOLECYSTECTOMY      HX COLONOSCOPY  9/19/2013, Dr Garland Pat    Diverticulosis only, f/u 10 yrs    HX CYST REMOVAL Right 2017    2 cyss removed from right middle finger    HX HYSTERECTOMY  1980    [facility]; NO HRT    HX ORTHOPAEDIC      benign tumor removed from left wrist    HX ORTHOPAEDIC Right 1/13/16, Dr Thurman Rom    right foot pin/plate    HX TONSILLECTOMY  1950    [facility]&A    LAP,CHOLECYSTECTOMY  9/92    ID COLONOSCOPY FLX DX W/COLLJ SPEC WHEN PFRMD  5/2008 , repeat 5 yrs    for rectal bleeding; hemorrhoids, diverticulae, small polyp         MEDICATIONS      Current Outpatient Prescriptions   Medication Sig    tetrahydrozoline HCl (VISINE OP) Apply  to eye.  simvastatin (ZOCOR) 10 mg tablet TAKE ONE TABLET BY MOUTH EVERY EVENING    losartan (COZAAR) 100 mg tablet Take 1 Tab by mouth daily.  metoprolol tartrate (LOPRESSOR) 25 mg tablet TAKE 1 TABLET BY MOUTH EVERY 12 HOURS    clopidogrel (PLAVIX) 75 mg tab Take 1 Tab by mouth daily.  metFORMIN ER (GLUCOPHAGE XR) 500 mg tablet TAKE 1 TABLET ONCE DAILY WITH DINNER    VESICARE 10 mg tablet TAKE ONE TABLET BY MOUTH ONE TIME DAILY    calcium-cholecalciferol, D3, (CALTRATE 600+D) tablet Take 1 Tab by mouth daily.  ACETAMINOPHEN/DIPHENHYDRAMINE (TYLENOL PM PO) Take  by mouth nightly as needed.  acetaminophen (TYLENOL) 650 mg CR tablet Take 1,300 mg by mouth daily as needed for Pain.  EPIPEN 2-MONICA 0.3 mg/0.3 mL (1:1,000) injection USE IMMEDIATELY AS NEEDED FOR ALLERGIC REACTION TO BEE STING    L. acidoph & paracasei- S therm- Bifido (JULIETA-Q) 8 billion cell cap cap Take 1 Cap by mouth daily.     vitamin e 400 unit Tab Take  by mouth daily.  multivitamins-minerals-lutein (CENTRUM SILVER) Tab Take 1 Tab by mouth daily. No current facility-administered medications for this visit.            ALLERGIES     Allergies   Allergen Reactions    Oxycodone Hives, Nausea Only and Other (comments)    Ace Inhibitors Cough    Bee Sting [Sting, Bee] Swelling    Fish Oil Other (comments)     GI upset    Lisinopril Cough    Motrin [Ibuprofen] Swelling and Myalgia    Oxycodone-Acetaminophen Nausea and Vomiting    Pcn [Penicillins] Swelling    Promethazine-Dm Other (comments)     HA, nightmares          SOCIAL HISTORY       Social History     Social History    Marital status:      Spouse name: N/A    Number of children: 3    Years of education: N/A     Occupational History    Fully Retired 2/2008    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      Retired     Social History Main Topics    Smoking status: Never Smoker    Smokeless tobacco: Never Used    Alcohol use 1.0 oz/week     2 Glasses of wine per week      Comment: 2 glasses of wine per week    Drug use: No    Sexual activity: Yes     Partners: Male     Other Topics Concern     Service No    Blood Transfusions No    Caffeine Concern No     2 cups of coffee qam    Occupational Exposure No    Hobby Hazards No    Sleep Concern No    Stress Concern No    Weight Concern No     working on losing wt    Special Diet No    Back Care No    Exercise Yes     wears Fitb Bit: targets 10 k steps 5 x a week    Bike Helmet Yes    Seat Belt Yes    Self-Exams Yes     Social History Narrative    Patient has an AMD        IMMUNIZATIONS  Immunization History   Administered Date(s) Administered    Influenza High Dose Vaccine PF 11/18/2016, 09/26/2017    Influenza Vaccine 11/08/2015    Influenza Vaccine Split 10/01/2012    Pneumococcal Polysaccharide (PPSV-23) 03/24/2014    TD Vaccine 05/07/1999    TDAP Vaccine 02/13/2009    Td, Adsorbed PF 2015    Varicella Virus Vaccine Live 10/01/2011    ZZZ-RETIRED (DO NOT USE) Pneumococcal Vaccine (Unspecified Type) 2009    Zoster 2011         FAMILY HISTORY     Family History   Problem Relation Age of Onset    Thyroid Disease Mother      h/o Grave's dz;  age 81yo   [facility] Osteoporosis Mother    [facility] Delayed Awakening Mother    [facility] Stroke Father       age 80    Other Father      AAA age 76, macular degeneration    Alcohol abuse Father      smoker and drinker    Osteoporosis Sister     Other Son      gout    Gout Son     Dementia Maternal Grandmother      dementia and mental illness    Other Brother      heart murmur    Liver Disease Brother      hepatitis c    Anesth Problems Neg Hx          VITALS     Visit Vitals    /88 (BP 1 Location: Right arm, BP Patient Position: Sitting)    Pulse 63    Temp 98.1 °F (36.7 °C) (Oral)    Resp 18    Ht 5' 5\" (1.651 m)    Wt 243 lb 6.4 oz (110.4 kg)    SpO2 96%    BMI 40.5 kg/m2          PHYSICAL EXAMINATION     Physical Exam   Constitutional: No distress. Neck: No JVD present. Carotid bruit is not present. Cardiovascular: Normal rate, regular rhythm and normal heart sounds. No murmur heard. Pulmonary/Chest: Effort normal and breath sounds normal. No respiratory distress. Abdominal: Soft. Bowel sounds are normal. She exhibits no distension and no mass. There is no tenderness. Vitals reviewed.          DIAGNOSTIC DATA                    ASSESSMENT & PLAN       ICD-10-CM ICD-9-CM    1. Diabetes mellitus type 2, noninsulin dependent (HCC) E11.9 250.00 MICROALBUMIN, UR, RAND W/ MICROALB/CREAT RATIO      HEMOGLOBIN A1C WITH EAG      METABOLIC PANEL, BASIC   2. Hypercholesterolemia E78.00 272.0 LIPID PANEL      ALT      AST   3.  Benign essential hypertension Z83 547.5 METABOLIC PANEL, BASIC      TSH 3RD GENERATION      CBC W/O DIFF     Fasting labs today  Reviewed diet, exercise and weight control  Cardiovascular risk and specific lipid/LDL goals reviewed  Reviewed medications and side effects  Further follow up & other recommendations pending review of labs.  If all remains good and stable, follow up in 6months, sooner prn

## 2018-05-08 LAB
ALBUMIN/CREAT UR: 11.8 MG/G CREAT (ref 0–30)
ALT SERPL-CCNC: 15 IU/L (ref 0–32)
AST SERPL-CCNC: 19 IU/L (ref 0–40)
BUN SERPL-MCNC: 18 MG/DL (ref 8–27)
BUN/CREAT SERPL: 27 (ref 12–28)
CALCIUM SERPL-MCNC: 9.3 MG/DL (ref 8.7–10.3)
CHLORIDE SERPL-SCNC: 100 MMOL/L (ref 96–106)
CHOLEST SERPL-MCNC: 155 MG/DL (ref 100–199)
CO2 SERPL-SCNC: 26 MMOL/L (ref 18–29)
CREAT SERPL-MCNC: 0.67 MG/DL (ref 0.57–1)
CREAT UR-MCNC: 82.1 MG/DL
ERYTHROCYTE [DISTWIDTH] IN BLOOD BY AUTOMATED COUNT: 15 % (ref 12.3–15.4)
EST. AVERAGE GLUCOSE BLD GHB EST-MCNC: 131 MG/DL
GFR SERPLBLD CREATININE-BSD FMLA CKD-EPI: 103 ML/MIN/1.73
GFR SERPLBLD CREATININE-BSD FMLA CKD-EPI: 89 ML/MIN/1.73
GLUCOSE SERPL-MCNC: 110 MG/DL (ref 65–99)
HBA1C MFR BLD: 6.2 % (ref 4.8–5.6)
HCT VFR BLD AUTO: 41 % (ref 34–46.6)
HDLC SERPL-MCNC: 40 MG/DL
HGB BLD-MCNC: 13.4 G/DL (ref 11.1–15.9)
INTERPRETATION, 910389: NORMAL
LDLC SERPL CALC-MCNC: 80 MG/DL (ref 0–99)
MCH RBC QN AUTO: 28.2 PG (ref 26.6–33)
MCHC RBC AUTO-ENTMCNC: 32.7 G/DL (ref 31.5–35.7)
MCV RBC AUTO: 86 FL (ref 79–97)
MICROALBUMIN UR-MCNC: 9.7 UG/ML
PLATELET # BLD AUTO: 241 X10E3/UL (ref 150–379)
POTASSIUM SERPL-SCNC: 4.1 MMOL/L (ref 3.5–5.2)
RBC # BLD AUTO: 4.75 X10E6/UL (ref 3.77–5.28)
SODIUM SERPL-SCNC: 141 MMOL/L (ref 134–144)
TRIGL SERPL-MCNC: 175 MG/DL (ref 0–149)
TSH SERPL DL<=0.005 MIU/L-ACNC: 1.46 UIU/ML (ref 0.45–4.5)
VLDLC SERPL CALC-MCNC: 35 MG/DL (ref 5–40)
WBC # BLD AUTO: 7.3 X10E3/UL (ref 3.4–10.8)

## 2018-06-19 LAB — MAMMOGRAPHY, EXTERNAL: NORMAL

## 2018-12-12 ENCOUNTER — OFFICE VISIT (OUTPATIENT)
Dept: FAMILY MEDICINE CLINIC | Age: 68
End: 2018-12-12

## 2018-12-12 ENCOUNTER — HOSPITAL ENCOUNTER (OUTPATIENT)
Dept: LAB | Age: 68
Discharge: HOME | End: 2018-12-12
Payer: MEDICARE

## 2018-12-12 VITALS
WEIGHT: 247.6 LBS | BODY MASS INDEX: 41.25 KG/M2 | HEART RATE: 61 BPM | SYSTOLIC BLOOD PRESSURE: 154 MMHG | HEIGHT: 65 IN | RESPIRATION RATE: 18 BRPM | DIASTOLIC BLOOD PRESSURE: 88 MMHG | TEMPERATURE: 98.3 F | OXYGEN SATURATION: 96 %

## 2018-12-12 DIAGNOSIS — E11.9 DIABETES MELLITUS TYPE 2, NONINSULIN DEPENDENT (HCC): ICD-10-CM

## 2018-12-12 DIAGNOSIS — Z23 NEED FOR VACCINATION WITH 13-POLYVALENT PNEUMOCOCCAL CONJUGATE VACCINE: ICD-10-CM

## 2018-12-12 DIAGNOSIS — I10 BENIGN ESSENTIAL HYPERTENSION: ICD-10-CM

## 2018-12-12 DIAGNOSIS — Z00.00 MEDICARE ANNUAL WELLNESS VISIT, SUBSEQUENT: Primary | ICD-10-CM

## 2018-12-12 DIAGNOSIS — E78.00 HYPERCHOLESTEROLEMIA: ICD-10-CM

## 2018-12-12 PROCEDURE — 83036 HEMOGLOBIN GLYCOSYLATED A1C: CPT

## 2018-12-12 PROCEDURE — 80061 LIPID PANEL: CPT

## 2018-12-12 PROCEDURE — 36415 COLL VENOUS BLD VENIPUNCTURE: CPT

## 2018-12-12 PROCEDURE — 80053 COMPREHEN METABOLIC PANEL: CPT

## 2018-12-12 NOTE — PROGRESS NOTES
Chief Complaint   Patient presents with    Hypertension     fasting follow up - did have 3  tic tacs    Diabetes    Cholesterol Problem    Annual Wellness Visit     1. Have you been to the ER, urgent care clinic since your last visit? Hospitalized since your last visit? No    2. Have you seen or consulted any other health care providers outside of the [facility] since your last visit? Include any pap smears or colon screening.    Yes Salud Esparza-Dermatologist

## 2018-12-12 NOTE — PROGRESS NOTES
This is the Subsequent Medicare Annual Wellness Exam, performed 12 months or more after the Initial AWV or the last Subsequent AWV    I have reviewed the patient's medical history in detail and updated the computerized patient record.      History     Past Medical History:   Diagnosis Date    ACP (advance care planning) 2016    Patient has an AMD    Adverse effect of anesthesia     difficulty waking and cold postop    Arthritis     Hip    Arthritis of right foot & ankle 2015    Ortho Dr Elizabeth Diaz; used a boot x 2 months, off     Bee sting allergy with anaphylaxis reaction 2015    Benign essential hypertension 2016    Candida intertrigo 3/29/2010    Candida intertrigo 2009    Cataracts, bilateral 2/3/2017    R>L, Dr Tierney Solorzano    Diabetes mellitus type 2, noninsulin dependent ([address] 2016    10/2014    Diabetes mellitus, type 2 ([address] 10/29/2014    10/2014    Frequent UTI     Hx of complete eye exam 2018    [facility]    Hx of mammogram 2017    [facility] [place]-No findings suspicious for malignancy-f/u one year    Hx of mammogram 2018    [facility] - No findings for malignancy identified-Yearly mammogram recommended     Hypercholesterolemia 1/3/2014     (normal spontaneous vaginal delivery)     A2, NSVDx3, SAB x2    Osteopenia 10/3/2007    Overactive bladder 3/28/2011    Pneumonia     Postmenopausal     No HRT    Postmenopausal, LMP 36 yo, No HRT history 3/18/2010    Prediabetes 2014    Recurrent cystitis 2014    Right knee pain 3/24/2014    2014; CSI per Ortho Dr [facility] office    Right shoulder pain 3/8/2012    S/P abdominal hysterectomy and left salpingo-oophorectomy     Uterine Fibroids; no HRT    S/P colonoscopy with polypectomy     benign polyp, f/u 5yrs; Dr. Denia Bergman S/P SCC excised from left hand     SCC (squamous cell carcinoma), hand -    Lt hand, SCC excised, Dr. Kim Welch TIA (transient ischemic attack) 6/2013      Past Surgical History:   Procedure Laterality Date    COLONOSCOPY  1/2006    q5yrs; Dr Everardo Wills; Diverticulosis, internal hemorrhoids, benign polyp    ECHO 2D ADULT  1997    Normal    ELECTROCARDIOGRAM REPORT  2010, 2009    Normal    HM DEXA SCAN  10/2007    Osteopenia; spine -1.5, fem neck -1.1    HX CATARACT REMOVAL [facility]ilateral 2/15/2018 and 2/22/2018    [facility]    HX CHOLECYSTECTOMY      HX COLONOSCOPY  9/19/2013, Dr Taiwo Sands    Diverticulosis only, f/u 10 yrs    HX CYST REMOVAL Right 2017    2 cyss removed from right middle finger    HX HYSTERECTOMY  1980    [facility]; NO HRT    HX ORTHOPAEDIC      benign tumor removed from left wrist    HX ORTHOPAEDIC Right 1/13/16, Dr Joaquín Limon    right foot pin/plate    HX TONSILLECTOMY  1950    [facility]    LAP,CHOLECYSTECTOMY  9/92    [place] COLONOSCOPY FLX DX W/COLLJ SPEC WHEN PFRMD  5/2008 , repeat 5 yrs    for rectal bleeding; hemorrhoids, diverticulae, small polyp     Current Outpatient Medications   Medication Sig Dispense Refill    pneumococcal 13 lyly conj dip (PREVNAR 13, PF,) 0.5 mL syrg injection 0.5 mL by IntraMUSCular route once for 1 dose. UPON ADMINISTRATION PLEASE FAX VERIFICATION -[phone], THANK YOU! 0.5 mL 0    losartan (COZAAR) 100 mg tablet TAKE ONE TABLET BY MOUTH ONE TIME DAILY 90 Tab 3    VESICARE 10 mg tablet TAKE ONE TABLET BY MOUTH ONE TIME DAILY 90 Tab 3    clopidogrel (PLAVIX) 75 mg tab TAKE ONE TABLET BY MOUTH ONE TIME DAILY 90 Tab 3    metFORMIN ER (GLUCOPHAGE XR) 500 mg tablet TAKE ONE TABLET BY MOUTH ONE TIME DAILY WITH DINNER 90 Tab 3    simvastatin (ZOCOR) 10 mg tablet TAKE ONE TABLET BY MOUTH EVERY EVENING 90 Tab 3    metoprolol tartrate (LOPRESSOR) 25 mg tablet TAKE 1 TABLET BY MOUTH EVERY 12 HOURS 180 Tab 1    calcium-cholecalciferol, D3, (CALTRATE 600+D) tablet Take 1 Tab by mouth daily.       ACETAMINOPHEN/DIPHENHYDRAMINE (TYLENOL PM PO) Take  by mouth nightly as needed.  acetaminophen (TYLENOL) 650 mg CR tablet Take 1,300 mg by mouth daily as needed for Pain.  EPIPEN 2-MONICA 0.3 mg/0.3 mL (1:1,000) injection USE IMMEDIATELY AS NEEDED FOR ALLERGIC REACTION TO BEE STING 2 Each 1    L. acidoph & paracasei- S therm- Bifido (JULIETA-Q) 8 billion cell cap cap Take 1 Cap by mouth daily. 10 Cap 0    vitamin e 400 unit Tab Take  by mouth daily.  multivitamins-minerals-lutein (CENTRUM SILVER) Tab Take 1 Tab by mouth daily. Allergies   Allergen Reactions    Oxycodone Hives, Nausea Only and Other (comments)    Ace Inhibitors Cough    Bee Sting [Sting, Bee] Swelling    Fish Oil Other (comments)     GI upset    Lisinopril Cough    Motrin [Ibuprofen] Swelling and Myalgia    Oxycodone-Acetaminophen Nausea and Vomiting    Pcn [Penicillins] Swelling    Promethazine-Dm Other (comments)     HA, nightmares     Family History   Problem Relation Age of Onset    Thyroid Disease Mother         h/o Grave's dz;  age 79yo   [facility] Osteoporosis Mother     Delayed Awakening Mother    [facility] Stroke Father          age 80    Other Father         AAA age 76, macular degeneration    Alcohol abuse Father         smoker and drinker    Osteoporosis Sister     Other Son         gout    Gout Son     Dementia Maternal Grandmother         dementia and mental illness    Other Brother         heart murmur    Liver Disease Brother         hepatitis c    Anesth Problems Neg Hx      Social History     Tobacco Use    Smoking status: Never Smoker    Smokeless tobacco: Never Used   Substance Use Topics    Alcohol use:  Yes     Alcohol/week: 1.0 oz     Types: 2 Glasses of wine per week     Comment: 2 glasses of wine per week     Patient Active Problem List   Diagnosis Code    Postmenopausal, LMP 36 yo, No HRT history Z78.0    Osteopenia M85.80    S/P colonoscopy with polypectomy Z98.890     (normal spontaneous vaginal delivery) O80    S/P abdominal hysterectomy and left salpingo-oophorectomy Z90.710, Z90.79, Z90.721    S/P SCC excised from left hand C44.621    Overactive bladder N32.81    Impaired fasting glucose R73.01    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    Hypercholesterolemia E78.00    Right knee pain M25.561    Arthritis of right foot & ankle M19.071    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.030    ACP (advance care planning) Z71.89    Benign essential hypertension I10    TIA (transient ischemic attack) G45.9    Diabetes mellitus type 2, noninsulin dependent (HCC) E11.9    Cataracts, bilateral H26.9    Obesity, morbid (HCC) E66.01       Depression Risk Factor Screening:     PHQ over the last two weeks 12/12/2018   PHQ Not Done -   Little interest or pleasure in doing things Not at all   Feeling down, depressed, irritable, or hopeless Not at all   Total Score PHQ 2 0     Alcohol Risk Factor Screening: You do not drink alcohol or very rarely. Functional Ability and Level of Safety:   Hearing Loss  Hearing is good. Activities of Daily Living  The home contains: no safety equipment. Patient does total self care    Fall Risk  Fall Risk Assessment, last 12 mths 12/12/2018   Able to walk? Yes   Fall in past 12 months? No   Fall with injury? -   Number of falls in past 12 months -   Fall Risk Score -       Abuse Screen  Patient is not abused    Cognitive Screening   Evaluation of Cognitive Function:  Has your family/caregiver stated any concerns about your memory: no      Patient Care Team   Patient Care Team:  Ga Gutiérrez MD as PCP - General  Luly Waddell MD (Cardiology)    Assessment/Plan   Education and counseling provided:  Are appropriate based on today's review and evaluation    Diagnoses and all orders for this visit:    1. Need for vaccination with 13-polyvalent pneumococcal conjugate vaccine  -     pneumococcal 13 lyly conj dip (PREVNAR 13, PF,) 0.5 mL syrg injection; 0.5 mL by IntraMUSCular route once for 1 dose.  UPON ADMINISTRATION PLEASE FAX VERIFICATION -[phone], THANK YOU!    2. Medicare annual wellness visit, subsequent    Other orders  -     HEMOGLOBIN A1C WITH [facility]  -      DIABETES FOOT EXAM        Health Maintenance Due   Topic Date Due    Shingrix Vaccine Age 50> (2 of 2) 07/05/2018    FOOT EXAM Q1  08/28/2018

## 2018-12-12 NOTE — PATIENT INSTRUCTIONS
Medicare Wellness Visit, Female     The best way to live healthy is to have a lifestyle where you eat a well-balanced diet, exercise regularly, limit alcohol use, and quit all forms of tobacco/nicotine, if applicable. Regular preventive services are another way to keep healthy. Preventive services (vaccines, screening tests, monitoring & exams) can help personalize your care plan, which helps you manage your own care. Screening tests can find health problems at the earliest stages, when they are easiest to treat. Salvador Manzanares follows the current, evidence-based guidelines published by the [facility] Esper[facility] ([facility]) when recommending preventive services for our patients. Because we follow these guidelines, sometimes recommendations change over time as research supports it. (For example, mammograms used to be recommended annually. Even though Medicare will still pay for an annual mammogram, the newer guidelines recommend a mammogram every two years for women of average risk.)  Of course, you and your doctor may decide to screen more often for some diseases, based on your risk and your health status. Preventive services for you include:  - Medicare offers their members a free annual wellness visit, which is time for you and your primary care provider to discuss and plan for your preventive service needs. Take advantage of this benefit every year!  -All adults over the age of 72 should receive the recommended pneumonia vaccines. Current USPSTF guidelines recommend a series of two vaccines for the best pneumonia protection.   -All adults should have a flu vaccine yearly and a tetanus vaccine every 10 years. All adults age 61 and older should receive a shingles vaccine once in their lifetime.    -A bone mass density test is recommended when a woman turns 65 to screen for osteoporosis. This test is only recommended one time, as a screening.  Some providers will use this same test as a disease monitoring tool if you already have osteoporosis. -All adults age 38-68 who are overweight should have a diabetes screening test once every three years.   -Other screening tests and preventive services for persons with diabetes include: an eye exam to screen for diabetic retinopathy, a kidney function test, a foot exam, and stricter control over your cholesterol.   -Cardiovascular screening for adults with routine risk involves an electrocardiogram (ECG) at intervals determined by your doctor.   -Colorectal cancer screenings should be done for adults age 54-65 with no increased risk factors for colorectal cancer. There are a number of acceptable methods of screening for this type of cancer. Each test has its own benefits and drawbacks. Discuss with your doctor what is most appropriate for you during your annual wellness visit. The different tests include: colonoscopy (considered the best screening method), a fecal occult blood test, a fecal DNA test, and sigmoidoscopy. -Breast cancer screenings are recommended every other year for women of normal risk, age 54-69.  -Cervical cancer screenings for women over age 72 are only recommended with certain risk factors.   -All adults born between [facility] should be screened once for Hepatitis C.      Here is a list of your current Health Maintenance items (your personalized list of preventive services) with a due date:  Health Maintenance Due   Topic Date Due    Shingles Vaccine (2 of 2) 07/05/2018    Diabetic Foot Care  08/28/2018

## 2018-12-12 NOTE — PROGRESS NOTES
HISTORY OF PRESENT ILLNESS   HPI  Fasting follow up diabetes, hypercholesterolemia, hypertension, labs and medication check. Not adhering well to diet or exercise lately. Weight up a few lbs. Been tending to  due to his failing health. Not checking BS's or BP's. Tolerating current medication regimen well w/o reactions or side effects. REVIEW OF SYMPTOMS     Review of Systems   Constitutional: Negative. Eyes: Negative. Respiratory: Negative. Cardiovascular: Negative. Gastrointestinal: Negative. Genitourinary: Negative. Musculoskeletal: Negative for myalgias. Neurological: Negative.     Psychiatric/Behavioral: Negative.            PROBLEM LIST/MEDICAL HISTORY      Problem List  Date Reviewed: 12/12/2018          Codes Class Noted    Obesity, morbid ([facility] ICD-10-CM: E66.01  ICD-9-CM: 278.01  2/5/2018        Cataracts, bilateral ICD-10-CM: H26.9  ICD-9-CM: 366.9  2/3/2017    Overview Addendum 8/28/2017  8:32 AM by Renetta Upton MD     [facility] Dr Hutson Lank Fall 2017             TIA (transient ischemic attack) ICD-10-CM: G45.9  ICD-9-CM: 435.9  11/18/2016    Overview Signed 11/18/2016 11:02 AM by Renetta Upton MD     [address] [place] [address] 11[facility]16[facility]11/3/16[facility]             Diabetes mellitus type 2, noninsulin dependent ([facility] ICD-10-CM: E11.9  ICD-9-CM: 250.00  11/18/2016    Overview Signed 11/18/2016  1:01 PM by Renetta Upton MD     10/2014             ACP (advance care planning) ICD-10-CM: Z71.89  ICD-9-CM: V65.49  8/2/2016    Overview Signed 8/2/2016  8:36 AM by Renetta Upton MD     Patient has an AMD             Benign essential hypertension ICD-10-CM: I10  ICD-9-CM: 401.1  8/2/2016        Bee sting allergy with anaphylaxis reaction ICD-10-CM: Z91.030  ICD-9-CM: V15.06  7/27/2015        Arthritis of right foot & ankle ICD-10-CM: M19.071  ICD-9-CM: 716.97  5/21/2015    Overview Signed 5/21/2015 10:38 AM by Renetta Upton Zach Lewis MD     Ortho Dr Bryan Vaz; used a boot x 2 months, off              Right knee pain ICD-10-CM: M25.561  ICD-9-CM: 719.46  3/24/2014    Overview Signed 3/24/2014 11:45 AM by Ga Gutiérrez MD     2014; CSI per Ortho Dr Cami Gustafson office             Hypercholesterolemia ICD-10-CM: E78.00  ICD-9-CM: 272.0  1/3/2014        Vertebrobasilar artery syndrome ICD-10-CM: G45.0  ICD-9-CM: 435.3  2013    Overview Addendum 2013 12:53 PM by Ga Gutiérrez MD     ER; Neuro Dr Sueanne Romberg; dc'd on ASA, Zocor and Metoprolol             Impaired fasting glucose ICD-10-CM: R73.01  ICD-9-CM: 790.21  4/3/2012    Overview Signed 4/3/2012  3:09 PM by Ga Gutiérrez MD     3/2012             Overactive bladder ICD-10-CM: O62.08  ICD-9-CM: 596.51  3/28/2011        S/P colonoscopy with polypectomy ICD-10-CM: W15.995  ICD-9-CM: V45.89  Unknown    Overview Signed 3/29/2010 12:47 PM by Ga Gutiérrez MD     benign polyp, f/u 5yrs; Dr. Ba SCHWARZ (normal spontaneous vaginal delivery) ICD-10-CM: O80  ICD-9-CM: 0  Unknown    Overview Signed 3/29/2010 12:48 PM by Ga Gutiérrez MD     A2, NSVDx3, SAB x2             S/P abdominal hysterectomy and left salpingo-oophorectomy ICD-10-CM: Z90.710, Z90.79, Z90.721  ICD-9-CM: V88.01, V45.77  Unknown    Overview Signed 3/29/2010 12:49 PM by Ga Gutiérrez MD     Uterine Fibroids; no HRT             S/P SCC excised from left hand ICD-10-CM: W49.422  ICD-9-CM: 173.62  Unknown    Overview Signed 3/29/2010 12:54 PM by Ga Gutiérrez MD     Lt hand, SCC excised, Dr. Laurie Delcid             Postmenopausal, LMP 36 yo, No HRT history ICD-10-CM: Z78.0  ICD-9-CM: V49.81  3/18/2010        Osteopenia ICD-10-CM: M85.80  ICD-9-CM: 733.90  10/3/2007                  PAST SURGICAL HISTORY       Past Surgical History:   Procedure Laterality Date    COLONOSCOPY  2006    q5yrs; Dr Ba Camp; Diverticulosis, internal hemorrhoids, benign polyp    ECHO 2D ADULT  1997    Normal    ELECTROCARDIOGRAM REPORT  2010, 2009    Normal    HM DEXA SCAN  10/2007    Osteopenia; spine -1.5, fem neck -1.1    HX CATARACT REMOVAL Bilateral 2/15/2018 and 2/22/2018    [facility]    HX CHOLECYSTECTOMY      HX COLONOSCOPY  9/19/2013, Dr Nir Cesar    Diverticulosis only, f/u 10 yrs    HX CYST REMOVAL Right 2017    2 cyss removed from right middle finger    HX HYSTERECTOMY  1980    [facility]; NO HRT    HX ORTHOPAEDIC      benign tumor removed from left wrist    HX ORTHOPAEDIC Right 1/13/16, Dr Karyn Springer    right foot pin/plate    HX TONSILLECTOMY  1950    T&A    LAP,CHOLECYSTECTOMY  9/92    MN COLONOSCOPY FLX DX W/COLLJ SPEC WHEN PFRMD  5/2008 , repeat 5 yrs    for rectal bleeding; hemorrhoids, diverticulae, small polyp         MEDICATIONS      Current Outpatient Medications   Medication Sig    pneumococcal 13 lyly conj dip (PREVNAR 13, PF,) 0.5 mL syrg injection 0.5 mL by IntraMUSCular route once for 1 dose. UPON ADMINISTRATION PLEASE FAX VERIFICATION -[phone], THANK YOU!    losartan (COZAAR) 100 mg tablet TAKE ONE TABLET BY MOUTH ONE TIME DAILY    VESICARE 10 mg tablet TAKE ONE TABLET BY MOUTH ONE TIME DAILY    clopidogrel (PLAVIX) 75 mg tab TAKE ONE TABLET BY MOUTH ONE TIME DAILY    metFORMIN ER (GLUCOPHAGE XR) 500 mg tablet TAKE ONE TABLET BY MOUTH ONE TIME DAILY WITH DINNER    simvastatin (ZOCOR) 10 mg tablet TAKE ONE TABLET BY MOUTH EVERY EVENING    metoprolol tartrate (LOPRESSOR) 25 mg tablet TAKE 1 TABLET BY MOUTH EVERY 12 HOURS    calcium-cholecalciferol, D3, (CALTRATE 600+D) tablet Take 1 Tab by mouth daily.  ACETAMINOPHEN/DIPHENHYDRAMINE (TYLENOL PM PO) Take  by mouth nightly as needed.  acetaminophen (TYLENOL) 650 mg CR tablet Take 1,300 mg by mouth daily as needed for Pain.     EPIPEN 2-MONICA 0.3 mg/0.3 mL (1:1,000) injection USE IMMEDIATELY AS NEEDED FOR ALLERGIC REACTION TO BEE STING    L. acidoph & paracasei- S therm- Bifido (JULIETA-Q) 8 billion cell cap cap Take 1 Cap by mouth daily.  vitamin e 400 unit Tab Take  by mouth daily.  multivitamins-minerals-lutein (CENTRUM SILVER) Tab Take 1 Tab by mouth daily. No current facility-administered medications for this visit. ALLERGIES     Allergies   Allergen Reactions    Oxycodone Hives, Nausea Only and Other (comments)    Ace Inhibitors Cough    Bee Sting [Sting, Bee] Swelling    Fish Oil Other (comments)     GI upset    Lisinopril Cough    Motrin [Ibuprofen] Swelling and Myalgia    Oxycodone-Acetaminophen Nausea and Vomiting    Pcn [Penicillins] Swelling    Promethazine-Dm Other (comments)     HA, nightmares          SOCIAL HISTORY       Social History     Socioeconomic History    Marital status:      Spouse name: Not on file    Number of children: 3    Years of education: Not on file    Highest education level: Not on file   Social Needs    Financial resource strain: Not on file    Food insecurity - worry: Not on file    Food insecurity - inability: Not on file   [employer] needs - medical: Not on file   [employer] needs - non-medical: Not on file   Occupational History    Occupation: Fully Retired 2/2008    Occupation: [employer] her grandchildren 3 x a week     Employer: RETIRED   Tobacco Use    Smoking status: Never Smoker    Smokeless tobacco: Never Used   Substance and Sexual Activity    Alcohol use:  Yes     Alcohol/week: 1.0 oz     Types: 2 Glasses of wine per week     Comment: 2 glasses of wine per week    Drug use: No    Sexual activity: Yes     Partners: Male   Other Topics Concern     Service No    Blood Transfusions No    Caffeine Concern No     Comment: 2 cups of coffee qam    Occupational Exposure No    Hobby Hazards No    Sleep Concern No    Stress Concern No    Weight Concern Not Asked    Special Diet No    Back Care No    Exercise No     Comment: nothing regular, just stays busy; wears Fitb Bit: targets 10 k steps 5 x a week    Bike Helmet Yes    Seat Belt Yes    Self-Exams Yes   Social History Narrative    Patient has an AMD        IMMUNIZATIONS  Immunization History   Administered Date(s) Administered    Influenza High Dose Vaccine PF 2016, 2017    Influenza Vaccine 2015    Influenza Vaccine (Tri) Adjuvanted 2018    Influenza Vaccine Split 10/01/2012    Pneumococcal Polysaccharide (PPSV-23) 2014    TD Vaccine 1999    TDAP Vaccine 2009    Td, Adsorbed PF 2015    Varicella Virus Vaccine Live 10/01/2011    ZZZ-RETIRED (DO NOT USE) Pneumococcal Vaccine (Unspecified Type) 2009    Zoster 2011    Zoster Recombinant 05/10/2018         FAMILY HISTORY     Family History   Problem Relation Age of Onset    Thyroid Disease Mother         h/o Grave's dz;  age 81yo   [address] [facility] Osteoporosis Mother    [address] [facility] Delayed Awakening Mother    [address] [facility] Stroke Father          age 80    Other Father         AAA age 76, macular degeneration    Alcohol abuse Father         smoker and drinker    Osteoporosis Sister     Other Son         gout    Gout Son     Dementia Maternal Grandmother         dementia and mental illness    Other Brother         heart murmur    Liver Disease Brother         hepatitis c    Anesth Problems Neg Hx          VITALS     Visit Vitals  /88 (BP 1 Location: Left arm, BP Patient Position: Sitting), repeated end of exam was the same   Pulse 61   Temp 98.3 °F (36.8 °C) (Oral)   Resp 18   Ht 5' 5\" (1.651 m)   Wt 247 lb 9.6 oz (112.3 kg)   SpO2 96%   BMI 41.20 kg/m²          PHYSICAL EXAMINATION     Physical Exam   Constitutional: No distress. Neck: Carotid bruit is not present. Cardiovascular: Normal rate, regular rhythm and normal heart sounds. No murmur heard. Pulmonary/Chest: Effort normal and breath sounds normal.   Abdominal: Soft. Bowel sounds are normal. She exhibits no distension and no mass.  There is no tenderness. Musculoskeletal: She exhibits no edema or tenderness. Neurological:   Diabetic foot exam: Normal monofilament testing B. Skin warm, dry, chapped. DP/PT pulses strong and intact. No skin breakdown, wounds, ulcers, lesions. Toenails thick, dystrophic. Psychiatric: Mood, affect and judgment normal.   Vitals reviewed.              ASSESSMENT & PLAN   Diagnoses and all orders for this visit:    1. Medicare annual wellness visit, subsequent; same note separate encounter    2. Need for vaccination with 13-polyvalent pneumococcal conjugate vaccine  -     pneumococcal 13 lyly conj dip (PREVNAR 13, PF,) 0.5 mL syrg injection; 0.5 mL by IntraMUSCular route once for 1 dose. UPON ADMINISTRATION PLEASE FAX VERIFICATION -[phone], THANK YOU!    3. Diabetes mellitus type 2, noninsulin dependent ([facility]  -      DIABETES FOOT EXAM  -     METABOLIC PANEL, COMPREHENSIVE  -     HEMOGLOBIN A1C WITH EAG    4. Hypercholesterolemia  -     LIPID PANEL    5. Benign essential hypertension, not controlled    Fasting labs today  Cardiovascular risk and specific lipid/LDL/BP/BS/HgBA1c goals reviewed  Reviewed diet, nutrition, exercise, weight management, BMI/goals, age/risk based screening recommendations, health maintenance & prevention counseling. Will monitor interim BP's. Up today which she a/t recent stress r/t 's health issues, not exercising, wt going up. She will start to work on all the above and update me w/ her interim BP's and we will go from there.    Reviewed medications and side effects   Specific diabetic recommendations: foot care discussed and Podiatry visits discussed, annual eye examinations at Ophthalmology discussed and long term diabetic complications discussed  Further follow up & other recommendations pending review of labs and interim BP's

## 2018-12-13 LAB
ALBUMIN SERPL-MCNC: 4.2 G/DL (ref 3.5–4.8)
ALBUMIN/GLOB SERPL: 1.9 {RATIO} (ref 1.2–2.2)
ALP SERPL-CCNC: 95 IU/L (ref 39–117)
ALT SERPL-CCNC: 12 IU/L (ref 0–32)
AST SERPL-CCNC: 15 IU/L (ref 0–40)
BILIRUB SERPL-MCNC: 0.6 MG/DL (ref 0–1.2)
BUN SERPL-MCNC: 12 MG/DL (ref 8–27)
BUN/CREAT SERPL: 16 (ref 12–28)
CALCIUM SERPL-MCNC: 9.7 MG/DL (ref 8.7–10.3)
CHLORIDE SERPL-SCNC: 102 MMOL/L (ref 96–106)
CHOLEST SERPL-MCNC: 156 MG/DL (ref 100–199)
CO2 SERPL-SCNC: 27 MMOL/L (ref 20–29)
CREAT SERPL-MCNC: 0.73 MG/DL (ref 0.57–1)
EST. AVERAGE GLUCOSE BLD GHB EST-MCNC: 131 MG/DL
GLOBULIN SER CALC-MCNC: 2.2 G/DL (ref 1.5–4.5)
GLUCOSE SERPL-MCNC: 105 MG/DL (ref 65–99)
HBA1C MFR BLD: 6.2 % (ref 4.8–5.6)
HDLC SERPL-MCNC: 44 MG/DL
INTERPRETATION, 910389: NORMAL
LDLC SERPL CALC-MCNC: 79 MG/DL (ref 0–99)
POTASSIUM SERPL-SCNC: 4.5 MMOL/L (ref 3.5–5.2)
PROT SERPL-MCNC: 6.4 G/DL (ref 6–8.5)
SODIUM SERPL-SCNC: 143 MMOL/L (ref 134–144)
TRIGL SERPL-MCNC: 166 MG/DL (ref 0–149)
VLDLC SERPL CALC-MCNC: 33 MG/DL (ref 5–40)

## 2019-01-20 DIAGNOSIS — I47.1 SVT (SUPRAVENTRICULAR TACHYCARDIA) (HCC): ICD-10-CM

## 2019-01-20 DIAGNOSIS — I10 BENIGN ESSENTIAL HYPERTENSION: Primary | ICD-10-CM

## 2019-01-20 RX ORDER — LOSARTAN POTASSIUM AND HYDROCHLOROTHIAZIDE 25; 100 MG/1; MG/1
1 TABLET ORAL DAILY
COMMUNITY
Start: 2019-01-20

## 2019-01-20 RX ORDER — DILTIAZEM HYDROCHLORIDE 180 MG/1
180 CAPSULE, COATED, EXTENDED RELEASE ORAL DAILY
COMMUNITY
Start: 2019-01-20

## 2019-02-19 ENCOUNTER — TELEPHONE (OUTPATIENT)
Dept: FAMILY MEDICINE CLINIC | Age: 69
End: 2019-02-19

## 2019-02-19 DIAGNOSIS — N32.81 OVERACTIVE BLADDER: Primary | ICD-10-CM

## 2019-02-19 NOTE — TELEPHONE ENCOUNTER
vesicare not covered. Pt has tried detrol before 2010, changed to detrol ER 3/23/10 then to vesicare 1/30/13,  Pt was changed to oxybutynin ER 10/22/15 b/c insurance denied the vesicare. The oxy didn't work so was able to go back to vesicare on 1/15/16. She has been on it since. Waiting to hear from cover my meds. Fax from [facility] stating pt needs to try myrbetrig ER or toviaz ER      I called pt to let her know that she will need to try the [place] before they will cover the vesicare. Pt only wants a 30 day supply sent in to the pharmacy.   rx sent per verbal order from Dr Willie Ferguson

## 2019-02-25 ENCOUNTER — TELEPHONE (OUTPATIENT)
Dept: FAMILY MEDICINE CLINIC | Age: 69
End: 2019-02-25

## 2019-02-25 DIAGNOSIS — N32.81 OVERACTIVE BLADDER: ICD-10-CM

## 2019-02-25 NOTE — TELEPHONE ENCOUNTER
Pharmacy is calling to see if they can get the medication called in/sent over to the pharmacy. See that the medication was approved, pharmacy does not have it. mirabegron ER (MYRBETRIQ) 25 mg ER tablet           . Pharmacy verified          [address] [facility], December 12, 2018  UOV:  Wednesday, June 12, 2019

## 2019-06-12 ENCOUNTER — HOSPITAL ENCOUNTER (OUTPATIENT)
Dept: LAB | Age: 69
Discharge: HOME | End: 2019-06-12
Payer: MEDICARE

## 2019-06-12 ENCOUNTER — OFFICE VISIT (OUTPATIENT)
Dept: FAMILY MEDICINE CLINIC | Age: 69
End: 2019-06-12

## 2019-06-12 VITALS
TEMPERATURE: 97.6 F | BODY MASS INDEX: 41.25 KG/M2 | SYSTOLIC BLOOD PRESSURE: 138 MMHG | HEART RATE: 74 BPM | DIASTOLIC BLOOD PRESSURE: 88 MMHG | OXYGEN SATURATION: 96 % | HEIGHT: 65 IN | WEIGHT: 247.6 LBS | RESPIRATION RATE: 18 BRPM

## 2019-06-12 DIAGNOSIS — I10 BENIGN ESSENTIAL HYPERTENSION: ICD-10-CM

## 2019-06-12 DIAGNOSIS — E78.00 HYPERCHOLESTEROLEMIA: ICD-10-CM

## 2019-06-12 DIAGNOSIS — Z11.59 NEED FOR HEPATITIS C SCREENING TEST: ICD-10-CM

## 2019-06-12 DIAGNOSIS — E11.9 DIABETES MELLITUS TYPE 2, NONINSULIN DEPENDENT (HCC): Primary | ICD-10-CM

## 2019-06-12 DIAGNOSIS — N32.81 OVERACTIVE BLADDER: ICD-10-CM

## 2019-06-12 PROBLEM — E11.51 TYPE 2 DIABETES MELLITUS WITH PERIPHERAL VASCULAR DISEASE (HCC): Status: ACTIVE | Noted: 2019-06-12

## 2019-06-12 PROCEDURE — 80053 COMPREHEN METABOLIC PANEL: CPT

## 2019-06-12 PROCEDURE — 36415 COLL VENOUS BLD VENIPUNCTURE: CPT

## 2019-06-12 PROCEDURE — 80061 LIPID PANEL: CPT

## 2019-06-12 PROCEDURE — 86803 HEPATITIS C AB TEST: CPT

## 2019-06-12 PROCEDURE — 83036 HEMOGLOBIN GLYCOSYLATED A1C: CPT

## 2019-06-12 PROCEDURE — 82043 UR ALBUMIN QUANTITATIVE: CPT

## 2019-06-12 RX ORDER — DOXYCYCLINE HYCLATE 20 MG
40 TABLET ORAL DAILY
Refills: 4 | COMMUNITY
Start: 2019-05-11 | End: 2021-12-08 | Stop reason: ALTCHOICE

## 2019-06-12 RX ORDER — SOLIFENACIN SUCCINATE 10 MG/1
10 TABLET, FILM COATED ORAL DAILY
Qty: 90 TAB | Refills: 3 | Status: SHIPPED | OUTPATIENT
Start: 2019-06-12 | End: 2020-05-08

## 2019-06-12 NOTE — PATIENT INSTRUCTIONS

## 2019-06-12 NOTE — PROGRESS NOTES
Chief Complaint   Patient presents with    Cholesterol Problem     follow up    Hypertension     follow up    Diabetes     follow up     1. Have you been to the ER, urgent care clinic since your last visit? Hospitalized since your last visit? No    2. Have you seen or consulted any other health care providers outside of the [address] [place] [address] since your last visit? Include any pap smears or colon screening. No     Patient stated that she had a melanoma removed from her face 5/2019.

## 2019-06-12 NOTE — PROGRESS NOTES
Chief Complaint   Patient presents with    Diabetes     fasting follow up    Hypertension     follow up    Cholesterol Problem     follow up     HISTORY OF PRESENT ILLNESS   HPI  Fasting follow up type 2 NIDDM, hypercholesterolemia, hypertension, labs and medication check. Does not check BS's, personal preference. Periodically checks BP's a few x a week. Brought in a log of her home readings which run 110-130's/70's and heart rates in the 60's-80's range. Feels well. She is taking Myrbetriq as per her insurance bc they would not cover the Vesicare which was the most effective OAB agent she has tried to date. She feels like her symptoms were 100% controlled on the Vesicare and only about 80% controlled on Myrbetriq. She would like to try to get the [address] approved again now. REVIEW OF SYMPTOMS   Review of Systems   Constitutional: Negative. Respiratory: Negative. Cardiovascular: Negative. Gastrointestinal: Negative. Musculoskeletal: Negative for myalgias. Neurological: Negative.             PROBLEM LIST/MEDICAL HISTORY     Problem List  Date Reviewed: 6/12/2019          Codes Class Noted    Type 2 diabetes mellitus with peripheral vascular disease ([facility] ICD-10-CM: E11.51  ICD-9-CM: 250.70, 443.81  6/12/2019        SVT (supraventricular tachycardia) ([facility] ICD-10-CM: I47.1  ICD-9-CM: 427.89  1/20/2019    Overview Signed 1/20/2019  1:08 PM by Maryan Calix MD     Admitted to [facility] 12-25-18 for chest pressure and palpitations; medications adjusted, Cardio Dr Gonzalo Garcia for Echo and stress test             Obesity, morbid ([facility] ICD-10-CM: E66.01  ICD-9-CM: 278.01  2/5/2018        Cataracts, bilateral ICD-10-CM: H26.9  ICD-9-CM: 366.9  2/3/2017    Overview Addendum 8/28/2017  8:32 AM by Maryan Calix MD     R>L, Dr Bird Jim, changing to Dr Judie Cordero Fall 2017             TIA (transient ischemic attack) ICD-10-CM: G45.9  ICD-9-CM: 435.9  11/18/2016    Overview Signed 11/18/2016 11:02 AM by Russell Patel MD     [address] [facility] 16-11/3/16, Neuro Dr Melita Diallo             Diabetes mellitus type 2, noninsulin dependent ([facility] ICD-10-CM: E11.9  ICD-9-CM: 250.00  2016    Overview Signed 2016  1:01 PM by Russell Patel MD     10/2014             ACP (advance care planning) ICD-10-CM: Z71.89  ICD-9-CM: V65.49  2016    Overview Signed 2016  8:36 AM by Russell Patel MD     Patient has an AMD             Benign essential hypertension ICD-10-CM: I10  ICD-9-CM: 401.1  2016        Bee sting allergy with anaphylaxis reaction ICD-10-CM: Z91.030  ICD-9-CM: V15.06  2015        Arthritis of right foot & ankle ICD-10-CM: M19.071  ICD-9-CM: 716.97  2015    Overview Signed 2015 10:38 AM by Russell Patel MD     Ortho Dr Viv Velasquez; used a boot x 2 months, off -             Right knee pain ICD-10-CM: M25.561  ICD-9-CM: 719.46  3/24/2014    Overview Signed 3/24/2014 11:45 AM by Russell Patel MD     2014; CSI per Ortho Dr An Dunham office             Hypercholesterolemia ICD-10-CM: E78.00  ICD-9-CM: 272.0  1/3/2014        Vertebrobasilar artery syndrome ICD-10-CM: G45.0  ICD-9-CM: 435.3  2013    Overview Addendum 2013 12:53 PM by Russell Patel MD     ER; Neuro Dr José Miguel Pat; dc'd on ASA, Zocor and Metoprolol             Impaired fasting glucose ICD-10-CM: R73.01  ICD-9-CM: 790.21  4/3/2012    Overview Signed 4/3/2012  3:09 PM by Russell Patel MD     3/2012             Overactive bladder ICD-10-CM: B91.75  ICD-9-CM: 596.51  3/28/2011        S/P colonoscopy with polypectomy ICD-10-CM: C01.012  ICD-9-CM: V45.89  Unknown    Overview Signed 3/29/2010 12:47 PM by Russell Patel MD     benign polyp, f/u 5yrs; Dr. Johanna MONTGOMERYVD (normal spontaneous vaginal delivery) ICD-10-CM: O80  ICD-9-CM: 291  Unknown    Overview Signed 3/29/2010 12:48 PM by Russell Patel MD     O2Y3D3, NSVDx3, SAB x2             S/P abdominal hysterectomy and left salpingo-oophorectomy ICD-10-CM: Z90.710, Z90.79, Z90.721  ICD-9-CM: V88.01, V45.77  Unknown    Overview Signed 3/29/2010 12:49 PM by Omari Kim MD     Uterine Fibroids; no HRT             S/P SCC excised from left hand ICD-10-CM: C44.621  ICD-9-CM: 173.62  Unknown    Overview Signed 3/29/2010 12:54 PM by Omari Kim MD     Lt hand, SCC excised, Dr. Alejandrina Cazares             Postmenopausal, LMP 36 yo, No HRT history ICD-10-CM: Z78.0  ICD-9-CM: V49.81  3/18/2010        Osteopenia ICD-10-CM: M85.80  ICD-9-CM: 733.90  10/3/2007                  PAST SURGICAL HISTORY     Past Surgical History:   Procedure Laterality Date    COLONOSCOPY  1/2006    q5yrs; Dr Rafy Shah; Diverticulosis, internal hemorrhoids, benign polyp    ECHO 2D ADULT  1997    Normal    ELECTROCARDIOGRAM REPORT  2010, 2009    Normal    HM DEXA SCAN  10/2007    Osteopenia; spine -1.5, fem neck -1.1    HX CATARACT REMOVAL [facility]ilateral 2/15/2018 and 2/22/2018    [facility]    HX CHOLECYSTECTOMY      HX COLONOSCOPY  9/19/2013, Dr Kala Brady    Diverticulosis only, f/u 10 yrs    HX CYST REMOVAL Right 2017    2 cyss removed from right middle finger    HX HYSTERECTOMY  1980    [facility]; NO HRT    HX ORTHOPAEDIC      benign tumor removed from left wrist    HX ORTHOPAEDIC Right 1/13/16, Dr Roswell Bumpers    right foot pin/plate    HX TONSILLECTOMY  1950    T&A    LAP,CHOLECYSTECTOMY  9/92    ID COLONOSCOPY FLX DX W/COLLJ SPEC WHEN PFRMD  5/2008 , repeat 5 yrs    for rectal bleeding; hemorrhoids, diverticulae, small polyp         MEDICATIONS     Current Outpatient Medications   Medication Sig    doxycycline (PERIOSTAT) 20 mg tablet TAKE TWO TABLETS BY MOUTH EVERY MORNING    clopidogrel (PLAVIX) 75 mg tab TAKE ONE TABLET BY MOUTH ONE TIME DAILY    metFORMIN ER (GLUCOPHAGE XR) 500 mg tablet TAKE ONE TABLET BY MOUTH ONE TIME DAILY WITH DINNER    MYRBETRIQ 25 mg ER tablet TAKE ONE TABLET BY MOUTH ONE TIME DAILY    simvastatin (ZOCOR) 10 mg tablet TAKE ONE TABLET BY MOUTH EVERY EVENING    metoprolol tartrate (LOPRESSOR) 25 mg tablet TAKE ONE TABLET BY MOUTH EVERY 12 HOURS    losartan-hydroCHLOROthiazide (HYZAAR) 100-25 mg per tablet Take 1 Tab by mouth daily. Changed from plain Losartan 100 mg daily per cardio 12-25-18    dilTIAZem CD (CARDIZEM CD) 180 mg ER capsule Take 1 Cap by mouth daily. Started per cardio 12-25-18 for SVT    calcium-cholecalciferol, D3, (CALTRATE 600+D) tablet Take 1 Tab by mouth daily.  ACETAMINOPHEN/DIPHENHYDRAMINE (TYLENOL PM PO) Take  by mouth nightly as needed.  acetaminophen (TYLENOL) 650 mg CR tablet Take 1,300 mg by mouth daily as needed for Pain.  EPIPEN 2-MONICA 0.3 mg/0.3 mL (1:1,000) injection USE IMMEDIATELY AS NEEDED FOR ALLERGIC REACTION TO BEE STING    L. acidoph & paracasei- S therm- Bifido (JULIETA-Q) 8 billion cell cap cap Take 1 Cap by mouth daily.  vitamin e 400 unit Tab Take  by mouth daily.  multivitamins-minerals-lutein (CENTRUM SILVER) Tab Take 1 Tab by mouth daily. No current facility-administered medications for this visit.            ALLERGIES     Allergies   Allergen Reactions    Oxycodone Hives, Nausea Only and Other (comments)    Ace Inhibitors Cough    Bee Sting [Sting, Bee] Swelling    Fish Oil Other (comments)     GI upset    Lisinopril Cough    Motrin [Ibuprofen] Swelling and Myalgia    Oxycodone-Acetaminophen Nausea and Vomiting    Pcn [Penicillins] Swelling    Promethazine-Dm Other (comments)     HA, nightmares          SOCIAL HISTORY     Social History     Socioeconomic History    Marital status:      Spouse name: Not on file    Number of children: 3    Years of education: Not on file    Highest education level: Not on file   Occupational History    Occupation: Fully Retired 2/2008    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     Employer: RETIRED   Tobacco Use    Smoking status: Never Smoker    Smokeless tobacco: Never Used   Substance and Sexual Activity    Alcohol use:  Yes     Alcohol/week: 1.0 oz     Types: 2 Glasses of wine per week     Comment: 2 glasses of wine per week    Drug use: No    Sexual activity: Yes     Partners: Male   Other Topics Concern     Service No    Blood Transfusions No    Caffeine Concern No     Comment: 2 cups of coffee qam    Occupational Exposure No    Hobby Hazards No    Sleep Concern No    Stress Concern No    Special Diet No    Back Care No    Exercise No     Comment: nothing regular, just stays busy; wears Fitb Bit: targets 10 k steps 5 x a week    Bike Helmet Yes    Seat Belt Yes    Self-Exams Yes   Social History Narrative    Patient has an AMD        IMMUNIZATIONS  Immunization History   Administered Date(s) Administered    (RETIRED) Pneumococcal Vaccine (Unspecified Type) 2009    Influenza High Dose Vaccine PF 2016, 2017    Influenza Vaccine 2015    Influenza Vaccine (Tri) Adjuvanted 2018    Influenza Vaccine Split 10/01/2012    Pneumococcal Conjugate (PCV-13) 2018    Pneumococcal Polysaccharide (PPSV-23) 2014    TD Vaccine 1999    TDAP Vaccine 2009    Td, Adsorbed PF 2015    Varicella Virus Vaccine Live 10/01/2011    Zoster 2011    Zoster Recombinant 05/10/2018         FAMILY HISTORY     Family History   Problem Relation Age of Onset    Thyroid Disease Mother         h/o Grave's dz;  age 81yo   [place] Osteoporosis Mother     Delayed Awakening Mother    [place] Stroke Father          age 80    Other Father         AAA age 76, macular degeneration    Alcohol abuse Father         smoker and drinker    Osteoporosis Sister     Other Son         gout    Gout Son     Dementia Maternal Grandmother         dementia and mental illness    Other Brother         heart murmur    Liver Disease Brother         hepatitis c    Anesth Problems Neg Hx          VITALS     Visit Vitals  /88   Pulse 74   Temp 97.6 °F (36.4 °C) (Oral)   Resp 18   Ht 5' 5\" (1.651 m)   Wt 247 lb 9.6 oz (112.3 kg)   SpO2 96%   BMI 41.20 kg/m²          PHYSICAL EXAMINATION   Physical Exam   Constitutional: She is well-developed, well-nourished, and in no distress. Neck: Neck supple. Cardiovascular: Normal rate, regular rhythm and normal heart sounds. No murmur heard. Pulmonary/Chest: Effort normal and breath sounds normal. No respiratory distress. Vitals reviewed. DIAGNOSTIC DATA         LABORATORY DATA     Results for orders placed or performed in visit on 12/12/18   LIPID PANEL   Result Value Ref Range    Cholesterol, total 156 100 - 199 mg/dL    Triglyceride 166 (H) 0 - 149 mg/dL    HDL Cholesterol 44 >39 mg/dL    VLDL, calculated 33 5 - 40 mg/dL    LDL, calculated 79 0 - 99 mg/dL   METABOLIC PANEL, COMPREHENSIVE   Result Value Ref Range    Glucose 105 (H) 65 - 99 mg/dL    BUN 12 8 - 27 mg/dL    Creatinine 0.73 0.57 - 1.00 mg/dL    GFR est non-AA 84 >59 mL/min/1.73    GFR est AA 96 >59 mL/min/1.73    BUN/Creatinine ratio 16 12 - 28    Sodium 143 134 - 144 mmol/L    Potassium 4.5 3.5 - 5.2 mmol/L    Chloride 102 96 - 106 mmol/L    CO2 27 20 - 29 mmol/L    Calcium 9.7 8.7 - 10.3 mg/dL    Protein, total 6.4 6.0 - 8.5 g/dL    Albumin 4.2 3.5 - 4.8 g/dL    GLOBULIN, TOTAL 2.2 1.5 - 4.5 g/dL    A-G Ratio 1.9 1.2 - 2.2    Bilirubin, total 0.6 0.0 - 1.2 mg/dL    Alk.  phosphatase 95 39 - 117 IU/L    AST (SGOT) 15 0 - 40 IU/L    ALT (SGPT) 12 0 - 32 IU/L   HEMOGLOBIN A1C WITH EAG   Result Value Ref Range    Hemoglobin A1c 6.2 (H) 4.8 - 5.6 %    Estimated average glucose 131 mg/dL   CVD REPORT   Result Value Ref Range    INTERPRETATION Note        Lab Results   Component Value Date/Time    Hemoglobin A1c 6.2 (H) 12/12/2018 08:53 AM    Hemoglobin A1c 6.2 (H) 05/07/2018 09:04 AM    Hemoglobin A1c 6.1 (H) 09/01/2017 08:26 AM    Glucose 105 (H) 12/12/2018 08:53 AM    Glucose (POC) 116 (H) 11/03/2016 11:52 AM    Microalb/Creat ratio (ug/mg creat.) 11.8 05/07/2018 09:04 AM    LDL, calculated 79 12/12/2018 08:53 AM    Creatinine 0.73 12/12/2018 08:53 AM          ASSESSMENT & PLAN       ICD-10-CM ICD-9-CM    1. Diabetes mellitus type 2, noninsulin dependent (HCC) E11.9 250.00 LIPID PANEL      METABOLIC PANEL, COMPREHENSIVE      MICROALBUMIN, UR, RAND W/ MICROALB/CREAT RATIO      HEMOGLOBIN A1C WITH EAG   2. Hypercholesterolemia E78.00 272.0 LIPID PANEL   3. Benign essential hypertension I10 401.1    4. Overactive bladder N32.81 596.51 solifenacin (VESICARE) 10 mg tablet   5. Need for hepatitis C screening test Z11.59 V73.89 HEPATITIS C AB     Fasting labs today  Cardiovascular risk and specific lipid/LDL/BS/BP goals reviewed  Reviewed medications and side effects, tolerating current regimen well  She is taking Myrbetriq as per her insurance bc they would not cover the Vesicare which was the most effective OAB agent she has tried to date. She feels like her symptoms were 100% controlled on the Vesicare and only about 80% controlled on Myrbetriq. She would like to try to get the [address] approved again now. Rx sent to her preferred pharm on file and will wait to see if another PA will be required  Reviewed diet, nutrition, exercise, weight management, BMI/goals, age/risk based screening recommendations, health maintenance & prevention counseling. Cancer screening USPTFS guidelines reviewed w/ pt today. Discussed benefits/positive/negative outcomes of screening based on age/risk stratification. Informed consent for/against screening based on pt's personal hx/risk factors. Updated in history above and health maintenance. Further follow up & other recommendations pending review of labs.  If all remains good and stable, follow up in 6 months, sooner prn

## 2019-06-13 ENCOUNTER — TELEPHONE (OUTPATIENT)
Dept: FAMILY MEDICINE CLINIC | Age: 69
End: 2019-06-13

## 2019-06-13 LAB
ALBUMIN SERPL-MCNC: 4.1 G/DL (ref 3.5–4.8)
ALBUMIN/CREAT UR: <8.5 MG/G CREAT (ref 0–30)
ALBUMIN/GLOB SERPL: 2 {RATIO} (ref 1.2–2.2)
ALP SERPL-CCNC: 100 IU/L (ref 39–117)
ALT SERPL-CCNC: 14 IU/L (ref 0–32)
AST SERPL-CCNC: 16 IU/L (ref 0–40)
BILIRUB SERPL-MCNC: 0.5 MG/DL (ref 0–1.2)
BUN SERPL-MCNC: 20 MG/DL (ref 8–27)
BUN/CREAT SERPL: 31 (ref 12–28)
CALCIUM SERPL-MCNC: 9.5 MG/DL (ref 8.7–10.3)
CHLORIDE SERPL-SCNC: 100 MMOL/L (ref 96–106)
CHOLEST SERPL-MCNC: 167 MG/DL (ref 100–199)
CO2 SERPL-SCNC: 27 MMOL/L (ref 20–29)
CREAT SERPL-MCNC: 0.64 MG/DL (ref 0.57–1)
CREAT UR-MCNC: 35.4 MG/DL
EST. AVERAGE GLUCOSE BLD GHB EST-MCNC: 134 MG/DL
GLOBULIN SER CALC-MCNC: 2.1 G/DL (ref 1.5–4.5)
GLUCOSE SERPL-MCNC: 110 MG/DL (ref 65–99)
HBA1C MFR BLD: 6.3 % (ref 4.8–5.6)
HCV AB S/CO SERPL IA: <0.1 S/CO RATIO (ref 0–0.9)
HDLC SERPL-MCNC: 47 MG/DL
INTERPRETATION, 910389: NORMAL
LDLC SERPL CALC-MCNC: 90 MG/DL (ref 0–99)
MICROALBUMIN UR-MCNC: <3 UG/ML
POTASSIUM SERPL-SCNC: 4.4 MMOL/L (ref 3.5–5.2)
PROT SERPL-MCNC: 6.2 G/DL (ref 6–8.5)
SODIUM SERPL-SCNC: 141 MMOL/L (ref 134–144)
TRIGL SERPL-MCNC: 150 MG/DL (ref 0–149)
VLDLC SERPL CALC-MCNC: 30 MG/DL (ref 5–40)

## 2019-06-13 NOTE — TELEPHONE ENCOUNTER
----- Message from [address] sent at 6/13/2019 10:37 AM EDT -----  Regarding: Dr. Gerson Luong Refill  Contact: [phone]  Dayanara from [address] [place] [address], stated that pt's script for \"Vesicare\" is not covered by her insurance and request an alternative. Please advise.

## 2019-06-14 NOTE — TELEPHONE ENCOUNTER
Called [facility] [phone], spoke to Red re:ericka. Questions answered. Ref # [id number]. They will notify us via fax in 24 - 72 hours of decision.

## 2019-06-14 NOTE — TELEPHONE ENCOUNTER
Patient is calling requesting to speak with MJ in regards to the prior auth for her meds.      Best contact# [phone]

## 2019-06-25 ENCOUNTER — PATIENT MESSAGE (OUTPATIENT)
Dept: FAMILY MEDICINE CLINIC | Age: 69
End: 2019-06-25

## 2019-12-18 ENCOUNTER — OFFICE VISIT (OUTPATIENT)
Dept: FAMILY MEDICINE CLINIC | Age: 69
End: 2019-12-18

## 2019-12-18 ENCOUNTER — HOSPITAL ENCOUNTER (OUTPATIENT)
Dept: LAB | Age: 69
Discharge: HOME | End: 2019-12-18
Payer: MEDICARE

## 2019-12-18 VITALS
DIASTOLIC BLOOD PRESSURE: 82 MMHG | BODY MASS INDEX: 42.49 KG/M2 | RESPIRATION RATE: 16 BRPM | SYSTOLIC BLOOD PRESSURE: 130 MMHG | HEART RATE: 67 BPM | HEIGHT: 65 IN | OXYGEN SATURATION: 96 % | WEIGHT: 255 LBS | TEMPERATURE: 98.5 F

## 2019-12-18 DIAGNOSIS — Z00.00 MEDICARE ANNUAL WELLNESS VISIT, SUBSEQUENT: Primary | ICD-10-CM

## 2019-12-18 DIAGNOSIS — I10 BENIGN ESSENTIAL HYPERTENSION: ICD-10-CM

## 2019-12-18 DIAGNOSIS — E11.9 DIABETES MELLITUS TYPE 2, NONINSULIN DEPENDENT (HCC): ICD-10-CM

## 2019-12-18 DIAGNOSIS — E78.00 HYPERCHOLESTEROLEMIA: ICD-10-CM

## 2019-12-18 DIAGNOSIS — N32.81 OVERACTIVE BLADDER: ICD-10-CM

## 2019-12-18 DIAGNOSIS — R35.1 NOCTURIA: ICD-10-CM

## 2019-12-18 PROCEDURE — 84450 TRANSFERASE (AST) (SGOT): CPT

## 2019-12-18 PROCEDURE — 80048 BASIC METABOLIC PNL TOTAL CA: CPT

## 2019-12-18 PROCEDURE — 80061 LIPID PANEL: CPT

## 2019-12-18 PROCEDURE — 83036 HEMOGLOBIN GLYCOSYLATED A1C: CPT

## 2019-12-18 PROCEDURE — 85027 COMPLETE CBC AUTOMATED: CPT

## 2019-12-18 PROCEDURE — 81001 URINALYSIS AUTO W/SCOPE: CPT

## 2019-12-18 PROCEDURE — 84460 ALANINE AMINO (ALT) (SGPT): CPT

## 2019-12-18 NOTE — PROGRESS NOTES
Chief Complaint   Patient presents with    Diabetes     follow up     Hypertension    Cholesterol Problem    Annual Wellness Visit          5. Have you been to the ER, urgent care clinic since your last visit? Hospitalized since your last visit? No    2. Have you seen or consulted any other health care providers outside of the [address] [place] [address] since your last visit? Include any pap smears or colon screening.  No

## 2019-12-18 NOTE — PROGRESS NOTES
This is the Subsequent Medicare Annual Wellness Exam, performed 12 months or more after the Initial AWV or the last Subsequent AWV    I have reviewed the patient's medical history in detail and updated the computerized patient record.      History     Patient Active Problem List   Diagnosis Code    Postmenopausal, LMP 36 yo, No HRT history Z78.0    Osteopenia M85.80    S/P colonoscopy with polypectomy Z98.890     (normal spontaneous vaginal delivery) O80    S/P abdominal hysterectomy and left salpingo-oophorectomy Z90.710, Z90.79, Z90.721    S/P SCC excised from left hand C44.621    Overactive bladder N32.81    Impaired fasting glucose R73.01    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    Hypercholesterolemia E78.00    Right knee pain M25.561    Arthritis of right foot & ankle M19.071    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.030    ACP (advance care planning) Z71.89    Benign essential hypertension I10    TIA (transient ischemic attack) G45.9    Diabetes mellitus type 2, noninsulin dependent ([address] E11.9    Cataracts, bilateral H26.9    Obesity, morbid ([facility]) E66.01    SVT (supraventricular tachycardia) ([facility]) I47.1    Type 2 diabetes mellitus with peripheral vascular disease ([address] E11.51     Past Medical History:   Diagnosis Date    ACP (advance care planning) 2016    Patient has an AMD    Adverse effect of anesthesia     difficulty waking and cold postop    Arthritis     Hip    Arthritis of right foot & ankle 2015    Ortho Dr Nel Longoria; used a boot x 2 months, off -    Bee sting allergy with anaphylaxis reaction 2015    Benign essential hypertension 2016    Candida intertrigo 3/29/2010    Cataracts, bilateral 2/3/2017    R>L, Dr Savannah Vargas    Diabetes mellitus type 2, noninsulin dependent ([address] 2016    10/2014    Frequent UTI     Hx of complete eye exam 2018    [facility]    Hx of mammogram 2018    [facility] - No findings for malignancy identified-Yearly mammogram recommended     Hypercholesterolemia 1/3/2014     (normal spontaneous vaginal delivery)     A2, NSVDx3, SAB x2    Osteopenia 10/3/2007    Overactive bladder 3/28/2011    Pneumonia     Postmenopausal     No HRT    Postmenopausal, LMP 36 yo, No HRT history 3/18/2010    Recurrent cystitis 2014    Right knee pain 3/24/2014    2014; CSI per Ortho Dr Lendel Lesch office    Right shoulder pain 3/8/2012    S/P abdominal hysterectomy and left salpingo-oophorectomy     Uterine Fibroids; no HRT    S/P colonoscopy with polypectomy     benign polyp, f/u 5yrs; Dr. New Vilchis S/P SCC excised from left hand     SCC (squamous cell carcinoma), hand -    Lt hand, SCC excised, Dr. Quyen Dunlap SVT (supraventricular tachycardia) ([facility] 2019    [facility] to [facility] for chest pressure and palpitations; medications adjusted, Cardio Dr Sana Ponce for Echo and stress test    TIA (transient ischemic attack) 2013      Past Surgical History:   Procedure Laterality Date    COLONOSCOPY  2006    q5yrs; Dr Rowan Piper; Diverticulosis, internal hemorrhoids, benign polyp    ECHO 2D ADULT      Normal    ELECTROCARDIOGRAM REPORT  ,     Normal     DEXA SCAN  10/2007    Osteopenia; spine -1.5, fem neck -1.1    HX CATARACT REMOVAL Bilateral 2/15/2018 and 2018    [facility]    HX CHOLECYSTECTOMY      HX COLONOSCOPY  2013, Dr Humberto Ulloa    Diverticulosis only, f/u 10 yrs    HX CYST REMOVAL Right     2 cyss removed from right middle finger    HX HYSTERECTOMY      [facility]; NO HRT    HX ORTHOPAEDIC      benign tumor removed from left wrist    HX ORTHOPAEDIC Right 16, Dr Celeste Curtis    right foot pin/plate    HX OTHER SURGICAL      Dermatology :  Removal a melamona left side of face     HX TONSILLECTOMY      T&A    LAP,CHOLECYSTECTOMY      AR COLONOSCOPY FLX DX W/COLLJ SPEC WHEN PFRMD  2008 , repeat 5 yrs    for rectal bleeding; hemorrhoids, diverticulae, small polyp     Current Outpatient Medications   Medication Sig Dispense Refill    simvastatin (ZOCOR) 10 mg tablet TAKE ONE TABLET BY MOUTH EVERY EVENING 90 Tab 1    metoprolol tartrate (LOPRESSOR) 25 mg tablet TAKE ONE TABLET BY MOUTH EVERY 12 HOURS 180 Tab 1    clopidogrel (PLAVIX) 75 mg tab TAKE ONE TABLET BY MOUTH ONE TIME DAILY 90 Tab 1    metFORMIN ER (GLUCOPHAGE XR) 500 mg tablet TAKE ONE TABLET BY MOUTH ONE TIME DAILY WITH DINNER 90 Tab 1    doxycycline (PERIOSTAT) 20 mg tablet TAKE TWO TABLETS BY MOUTH EVERY MORNING  4    solifenacin (VESICARE) 10 mg tablet Take 1 Tab by mouth daily. 90 Tab 3    losartan-hydroCHLOROthiazide (HYZAAR) 100-25 mg per tablet Take 1 Tab by mouth daily. Changed from plain Losartan 100 mg daily per cardio 12-25-18      dilTIAZem CD (CARDIZEM CD) 180 mg ER capsule Take 1 Cap by mouth daily. Started per cardio 12-25-18 for SVT      calcium-cholecalciferol, D3, (CALTRATE 600+D) tablet Take 1 Tab by mouth daily.  acetaminophen (TYLENOL) 650 mg CR tablet Take 1,300 mg by mouth daily as needed for Pain.  EPIPEN 2-MONICA 0.3 mg/0.3 mL (1:1,000) injection USE IMMEDIATELY AS NEEDED FOR ALLERGIC REACTION TO BEE STING 2 Each 1    L. acidoph & paracasei- S therm- Bifido (JULIETA-Q) 8 billion cell cap cap Take 1 Cap by mouth daily. 10 Cap 0    vitamin e 400 unit Tab Take  by mouth daily.  multivitamins-minerals-lutein (CENTRUM SILVER) Tab Take 1 Tab by mouth daily.        Allergies   Allergen Reactions    Oxycodone Hives, Nausea Only and Other (comments)    Ace Inhibitors Cough    Bee Sting [Sting, Bee] Swelling    Fish Oil Other (comments)     GI upset    Lisinopril Cough    Motrin [Ibuprofen] Swelling and Myalgia    Oxycodone-Acetaminophen Nausea and Vomiting    Pcn [Penicillins] Swelling    Promethazine-Dm Other (comments)     HA, nightmares       Family History   Problem Relation Age of Onset    Thyroid Disease Mother         h/o Grave's dz;  age 79yo   [facility] Osteoporosis Mother    [facility] Delayed Awakening Mother    [facility] Stroke Father          age 80    Other Father         AAA age 76, macular degeneration    Alcohol abuse Father         smoker and drinker    Osteoporosis Sister     Other Son         gout    Gout Son     Dementia Maternal Grandmother         dementia and mental illness    Other Brother         heart murmur    Liver Disease Brother         hepatitis c    Anesth Problems Neg Hx      Social History     Tobacco Use    Smoking status: Never Smoker    Smokeless tobacco: Never Used   Substance Use Topics    Alcohol use: Yes     Alcohol/week: 1.7 standard drinks     Types: 2 Glasses of wine per week     Comment: 2 glasses of wine per week       Depression Risk Factor Screening:     3 most recent PHQ Screens 2019   PHQ Not Done -   Little interest or pleasure in doing things Not at all   Feeling down, depressed, irritable, or hopeless Not at all   Total Score PHQ 2 0         Alcohol Risk Factor Screening:   Do you average 1 drink per night or more than 7 drinks a week:  No    On any one occasion in the past three months have you have had more than 3 drinks containing alcohol:  No      Functional Ability and Level of Safety:   Hearing: Hearing is good. Activities of Daily Living: The home contains: no safety equipment.   Patient does total self care  ADL Assessment 2019   Feeding yourself No Help Needed   Getting from bed to chair No Help Needed   Getting dressed No Help Needed   Bathing or showering No Help Needed   Walk across the room (includes cane/walker) No Help Needed   Using the telphone No Help Needed   Taking your medications No Help Needed   Preparing meals No Help Needed   Managing money (expenses/bills) No Help Needed   Moderately strenuous housework (laundry) No Help Needed   Shopping for personal items (toiletries/medicines) No Help Needed   Shopping for groceries No Help Needed   Driving No Help Needed   Climbing a flight of stairs No Help Needed   Getting to places beyond walking distances No Help Needed       Ambulation: with no difficulty    Fall Risk:  Fall Risk Assessment, last 12 mths 12/18/2019   Able to walk? Yes   Fall in past 12 months? Yes   Fall with injury? No   Number of falls in past 12 months 1   Fall Risk Score 1       Abuse Screen:  Patient is not abused    Cognitive Screening   Has your family/caregiver stated any concerns about your memory: no      Patient Care Team   Patient Care Team:  Jase Robles MD as PCP - General  Jase Robles MD as PCP - [facility] Provider  Kimberlee Pedraza MD (Cardiology)    Assessment/Plan   Education and counseling provided:  Are appropriate based on today's review and evaluation    Diagnoses and all orders for this visit:    1.  Medicare annual wellness visit, subsequent        Health Maintenance Due   Topic Date Due    HEMOGLOBIN A1C Q6M  12/12/2019    GLAUCOMA SCREENING Q2Y  01/11/2020    EYE EXAM RETINAL OR DILATED  01/11/2020

## 2019-12-18 NOTE — PROGRESS NOTES
Chief Complaint   Patient presents with    Diabetes     fasting follow up    Hypertension    Cholesterol Problem    Annual Wellness Visit            HISTORY OF PRESENT ILLNESS   HPI  Fasting follow up Type 2 NIDDM, Hypercholesterolemia, Hypertension, labs and medication check. Nothing special w/ diet. No regular exercise. Wt has gone up. Not checking BS's, personal preference. Complying w/ meds and tolerating w/o reaction or side effects. Bp's good and stable. 1 month h/o nocturia 3-4 x a night. No daytime symptoms. Denies dysuria, increased incontinence or urgency except once yesterday. REVIEW OF SYMPTOMS   Review of Systems   Constitutional: Negative. Eyes: Negative. Respiratory: Negative. Cardiovascular: Negative. Gastrointestinal: Negative. Genitourinary: Positive for frequency and urgency. Negative for dysuria and hematuria. Musculoskeletal: Negative for myalgias. Neurological: Negative. Negative for dizziness, tingling, sensory change and headaches. Endo/Heme/Allergies: Negative for polydipsia. Psychiatric/Behavioral: Negative.             PROBLEM LIST/MEDICAL HISTORY     Problem List  Date Reviewed: 12/18/2019          Codes Class Noted    Type 2 diabetes mellitus with peripheral vascular disease ([facility] ICD-10-CM: E11.51  ICD-9-CM: 250.70, 443.81  6/12/2019        SVT (supraventricular tachycardia) ([facility] ICD-10-CM: I47.1  ICD-9-CM: 427.89  1/20/2019    Overview Signed 1/20/2019  1:08 PM by Sanjiv Gordillo MD     Admitted to [facility] 12-25-18 for chest pressure and palpitations; medications adjusted, Cardio Dr Mogran Zayas for Echo and stress test             Obesity, morbid ([facility] ICD-10-CM: E66.01  ICD-9-CM: 278.01  2/5/2018        Cataracts, bilateral ICD-10-CM: H26.9  ICD-9-CM: 366.9  2/3/2017    Overview Addendum 8/28/2017  8:32 AM by Sanjiv Gordillo MD     R>L, Dr Scott Mcqueen, changing to Dr Jessica Sanches Ding 2017             TIA (transient ischemic attack) ICD-10-CM: G45.9  ICD-9-CM: 435.9  2016    Overview Signed 2016 11:02 AM by Oksana Webster MD     [facility] 16-11/3/16, Neuro Dr Ivana Jimenez             Diabetes mellitus type 2, noninsulin dependent ([facility] ICD-10-CM: E11.9  ICD-9-CM: 250.00  2016    Overview Signed 2016  1:01 PM by Oksana Webster MD     10/2014             ACP (advance care planning) ICD-10-CM: Z71.89  ICD-9-CM: V65.49  2016    Overview Signed 2016  8:36 AM by Oksana Webster MD     Patient has an AMD             Benign essential hypertension ICD-10-CM: I10  ICD-9-CM: 401.1  2016        Bee sting allergy with anaphylaxis reaction ICD-10-CM: Z91.030  ICD-9-CM: V15.06  2015        Arthritis of right foot & ankle ICD-10-CM: M19.071  ICD-9-CM: 716.97  2015    Overview Signed 2015 10:38 AM by Oksana Webster MD     Ortho Dr Joseph Crabtree; used a boot x 2 months, off              Right knee pain ICD-10-CM: M25.561  ICD-9-CM: 719.46  3/24/2014    Overview Signed 3/24/2014 11:45 AM by Oksana Webster MD     2014; CSI per Ortho Dr Jm Nelson office             Hypercholesterolemia ICD-10-CM: E78.00  ICD-9-CM: 272.0  1/3/2014        Vertebrobasilar artery syndrome ICD-10-CM: G45.0  ICD-9-CM: 435.3  2013    Overview Addendum 2013 12:53 PM by Oksana Webster MD     ER; Neuro Dr Michelle Situ; dc'd on ASA, Zocor and Metoprolol             Impaired fasting glucose ICD-10-CM: R73.01  ICD-9-CM: 790.21  4/3/2012    Overview Signed 4/3/2012  3:09 PM by Oksana Webster MD     3/2012             Overactive bladder ICD-10-CM: J57.82  ICD-9-CM: 596.51  3/28/2011        S/P colonoscopy with polypectomy ICD-10-CM: A35.582  ICD-9-CM: V45.89  Unknown    Overview Signed 3/29/2010 12:47 PM by Oksana Webster MD     benign polyp, f/u 5yrs; Dr. Alisia Whitman              (normal spontaneous vaginal delivery) ICD-10-CM: O80  ICD-9-CM: 858  Unknown    Overview Signed 3/29/2010 12:48 PM by Ismael Galloway MD     A2, NSVDx3, SAB x2             S/P abdominal hysterectomy and left salpingo-oophorectomy ICD-10-CM: Z90.710, Z90.79, Z90.721  ICD-9-CM: V88.01, V45.77  Unknown    Overview Signed 3/29/2010 12:49 PM by Ismael Galloway MD     Uterine Fibroids; no HRT             S/P SCC excised from left hand ICD-10-CM: C44.621  ICD-9-CM: 173.62  Unknown    Overview Signed 3/29/2010 12:54 PM by Ismael Galloway MD     Lt hand, SCC excised, Dr. Jose Luis Ha             Postmenopausal, LMP 36 yo, No HRT history ICD-10-CM: Z78.0  ICD-9-CM: V49.81  3/18/2010        Osteopenia ICD-10-CM: M85.80  ICD-9-CM: 733.90  10/3/2007                  PAST SURGICAL HISTORY     Past Surgical History:   Procedure Laterality Date    COLONOSCOPY  2006    q5yrs; Dr Rogers Setting; Diverticulosis, internal hemorrhoids, benign polyp    ECHO 2D ADULT      Normal    ELECTROCARDIOGRAM REPORT  ,     Normal    HM DEXA SCAN  10/2007    Osteopenia; spine -1.5, fem neck -1.1    HX CATARACT REMOVAL Bilateral 2/15/2018 and 2018    [facility]    HX CHOLECYSTECTOMY      HX COLONOSCOPY  2013, Dr Yessi Batres    Diverticulosis only, f/u 10 yrs    HX CYST REMOVAL Right     2 cyss removed from right middle finger    HX HYSTERECTOMY      [facility]; NO HRT    HX ORTHOPAEDIC      benign tumor removed from left wrist    HX ORTHOPAEDIC Right 16, Dr Iliana Lanza    right foot pin/plate    HX OTHER SURGICAL      Dermatology :  Removal a melamona left side of face     HX TONSILLECTOMY  1950    T&A    LAP,CHOLECYSTECTOMY      WY COLONOSCOPY FLX DX W/COLLJ SPEC WHEN PFRMD  2008 , repeat 5 yrs    for rectal bleeding; hemorrhoids, diverticulae, small polyp         MEDICATIONS     Current Outpatient Medications   Medication Sig    simvastatin (ZOCOR) 10 mg tablet TAKE ONE TABLET BY MOUTH EVERY EVENING    metoprolol tartrate (LOPRESSOR) 25 mg tablet TAKE ONE TABLET BY MOUTH EVERY 12 HOURS    clopidogrel (PLAVIX) 75 mg tab TAKE ONE TABLET BY MOUTH ONE TIME DAILY    metFORMIN ER (GLUCOPHAGE XR) 500 mg tablet TAKE ONE TABLET BY MOUTH ONE TIME DAILY WITH DINNER    doxycycline (PERIOSTAT) 20 mg tablet TAKE TWO TABLETS BY MOUTH EVERY MORNING    solifenacin (VESICARE) 10 mg tablet Take 1 Tab by mouth daily.  losartan-hydroCHLOROthiazide (HYZAAR) 100-25 mg per tablet Take 1 Tab by mouth daily. Changed from plain Losartan 100 mg daily per cardio 12-25-18    dilTIAZem CD (CARDIZEM CD) 180 mg ER capsule Take 1 Cap by mouth daily. Started per cardio 12-25-18 for SVT    calcium-cholecalciferol, D3, (CALTRATE 600+D) tablet Take 1 Tab by mouth daily.  acetaminophen (TYLENOL) 650 mg CR tablet Take 1,300 mg by mouth daily as needed for Pain.  EPIPEN 2-MONICA 0.3 mg/0.3 mL (1:1,000) injection USE IMMEDIATELY AS NEEDED FOR ALLERGIC REACTION TO BEE STING    L. acidoph & paracasei- S therm- Bifido (JULIETA-Q) 8 billion cell cap cap Take 1 Cap by mouth daily.  vitamin e 400 unit Tab Take  by mouth daily.  multivitamins-minerals-lutein (CENTRUM SILVER) Tab Take 1 Tab by mouth daily. No current facility-administered medications for this visit.            ALLERGIES     Allergies   Allergen Reactions    Oxycodone Hives, Nausea Only and Other (comments)    Ace Inhibitors Cough    Bee Sting [Sting, Bee] Swelling    Fish Oil Other (comments)     GI upset    Lisinopril Cough    Motrin [Ibuprofen] Swelling and Myalgia    Oxycodone-Acetaminophen Nausea and Vomiting    Pcn [Penicillins] Swelling    Promethazine-Dm Other (comments)     HA, nightmares          SOCIAL HISTORY     Social History     Socioeconomic History    Marital status:      Spouse name: Not on file    Number of children: 3    Years of education: Not on file    Highest education level: Not on file   Occupational History    Occupation: Fully Retired 2/2008    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   Tobacco Use    Smoking status: Never Smoker    Smokeless tobacco: Never Used   Substance and Sexual Activity    Alcohol use:  Yes     Alcohol/week: 1.7 standard drinks     Types: 2 Glasses of wine per week     Comment: 2 glasses of wine per week    Drug use: No    Sexual activity: Yes     Partners: Male   Other Topics Concern     Service No    Blood Transfusions No    Caffeine Concern No     Comment: 2 cups of coffee qam    Occupational Exposure No    Hobby Hazards No    Sleep Concern No    Stress Concern No    Special Diet No    Back Care No    Exercise No    Bike Helmet Yes    Seat Belt Yes    Self-Exams Yes   Social History Narrative    Patient has an AMD        IMMUNIZATIONS  Immunization History   Administered Date(s) Administered    (RETIRED) Pneumococcal Vaccine (Unspecified Type) 2009    Influenza High Dose Vaccine PF 2016, 2017, 10/30/2019    Influenza Vaccine 2015    Influenza Vaccine (Tri) Adjuvanted 2018, 10/30/2019    Influenza Vaccine Split 10/01/2012    Pneumococcal Conjugate (PCV-13) 2018    Pneumococcal Polysaccharide (PPSV-23) 2014    TD Vaccine 1999    TDAP Vaccine 2009    Td, Adsorbed PF 2015    Varicella Virus Vaccine Live 10/01/2011    Zoster 2011    Zoster Recombinant 05/10/2018, 2018         FAMILY HISTORY     Family History   Problem Relation Age of Onset    Thyroid Disease Mother         h/o Grave's dz;  age 81yo   Velásquez Osteoporosis Mother    Velásquez Delayed Awakening Mother    Velásquez Stroke Father          age 80    Other Father         AAA age 76, macular degeneration    Alcohol abuse Father         smoker and drinker    Osteoporosis Sister     Other Son         gout    Gout Son     Dementia Maternal Grandmother         dementia and mental illness    Other Brother         heart murmur    Liver Disease Brother         hepatitis c    Anesth Problems Neg Hx VITALS     Visit Vitals  /82 (BP 1 Location: Right arm, BP Patient Position: Sitting)   Pulse 67   Temp 98.5 °F (36.9 °C) (Oral)   Resp 16   Ht 5' 5\" (1.651 m)   Wt 255 lb (115.7 kg)   SpO2 96%   BMI 42.43 kg/m²          PHYSICAL EXAMINATION   Physical Exam  Vitals signs reviewed. Constitutional:       General: She is not in acute distress. Appearance: She is obese. Eyes:      General: No scleral icterus. Neck:      Musculoskeletal: Neck supple. No muscular tenderness. Vascular: No carotid bruit. Cardiovascular:      Rate and Rhythm: Normal rate and regular rhythm. Pulses: Normal pulses. Heart sounds: Normal heart sounds. No murmur. No gallop. Pulmonary:      Effort: Pulmonary effort is normal. No respiratory distress. Breath sounds: Normal breath sounds. Abdominal:      General: Bowel sounds are normal.      Palpations: Abdomen is soft. Tenderness: There is no tenderness. Musculoskeletal:         General: No swelling or tenderness. Right foot: Normal range of motion. Left foot: Normal range of motion. Feet:      Right foot:      Skin integrity: Callus, dry skin and fissure present. No ulcer. Toenail Condition: Right toenails are abnormally thick and long. Left foot:      Skin integrity: Callus, dry skin and fissure present. No ulcer. Toenail Condition: Left toenails are abnormally thick and long. Skin:     General: Skin is warm and dry. Neurological:      General: No focal deficit present. Mental Status: She is oriented to person, place, and time. Gait: Gait normal.      Comments: Diabetic foot exam: Normal monofilament testing B. Skin warm, dry, chapped, cracked. DP/PT pulses strong and intact.                 DIAGNOSTIC DATA         LABORATORY DATA     Results for orders placed or performed in visit on 06/12/19   LIPID PANEL   Result Value Ref Range    Cholesterol, total 167 100 - 199 mg/dL    Triglyceride 150 (H) 0 - 149 mg/dL    HDL Cholesterol 47 >39 mg/dL    VLDL, calculated 30 5 - 40 mg/dL    LDL, calculated 90 0 - 99 mg/dL   METABOLIC PANEL, COMPREHENSIVE   Result Value Ref Range    Glucose 110 (H) 65 - 99 mg/dL    BUN 20 8 - 27 mg/dL    Creatinine 0.64 0.57 - 1.00 mg/dL    GFR est non-AA 90 >59 mL/min/1.73    GFR est  >59 mL/min/1.73    BUN/Creatinine ratio 31 (H) 12 - 28    Sodium 141 134 - 144 mmol/L    Potassium 4.4 3.5 - 5.2 mmol/L    Chloride 100 96 - 106 mmol/L    CO2 27 20 - 29 mmol/L    Calcium 9.5 8.7 - 10.3 mg/dL    Protein, total 6.2 6.0 - 8.5 g/dL    Albumin 4.1 3.5 - 4.8 g/dL    GLOBULIN, TOTAL 2.1 1.5 - 4.5 g/dL    A-G Ratio 2.0 1.2 - 2.2    Bilirubin, total 0.5 0.0 - 1.2 mg/dL    Alk. phosphatase 100 39 - 117 IU/L    AST (SGOT) 16 0 - 40 IU/L    ALT (SGPT) 14 0 - 32 IU/L   MICROALBUMIN, UR, RAND W/ MICROALB/CREAT RATIO   Result Value Ref Range    Creatinine, urine 35.4 Not Estab. mg/dL    Microalbumin, urine <3.0 Not Estab. ug/mL    Microalb/Creat ratio (ug/mg creat.) <8.5 0.0 - 30.0 mg/g creat   HEMOGLOBIN A1C WITH EAG   Result Value Ref Range    Hemoglobin A1c 6.3 (H) 4.8 - 5.6 %    Estimated average glucose 134 mg/dL   HEPATITIS C AB   Result Value Ref Range    Hep C Virus Ab <0.1 0.0 - 0.9 s/co ratio   CVD REPORT   Result Value Ref Range    INTERPRETATION Note        Lab Results   Component Value Date/Time    Hemoglobin A1c 6.3 (H) 06/12/2019 08:43 AM    Hemoglobin A1c 6.2 (H) 12/12/2018 08:53 AM    Hemoglobin A1c 6.2 (H) 05/07/2018 09:04 AM    Glucose 110 (H) 06/12/2019 08:43 AM    Glucose (POC) 116 (H) 11/03/2016 11:52 AM    Microalb/Creat ratio (ug/mg creat.) <8.5 06/12/2019 08:43 AM    LDL, calculated 90 06/12/2019 08:43 AM    Creatinine 0.64 06/12/2019 08:43 AM          ASSESSMENT & PLAN       ICD-10-CM ICD-9-CM    1. Medicare annual wellness visit, subsequent Z00.00 V70.0    2.  Diabetes mellitus type 2, noninsulin dependent (HCC) E11.9 250.00  DIABETES EYE EXAM      CBC W/O DIFF      LIPID PANEL      METABOLIC PANEL, BASIC      ALT      AST      HEMOGLOBIN A1C WITH EAG   3. Hypercholesterolemia E78.00 272.0 LIPID PANEL   4. Benign essential hypertension I10 401.1 CBC W/O DIFF      LIPID PANEL      METABOLIC PANEL, BASIC   5. Nocturia R35.1 788.43 HEMOGLOBIN A1C WITH EAG      UA WITH REFLEX MICRO AND CULTURE   6. Overactive bladder N32.81 596.51      Fasting labs today  Cardiovascular risk and specific lipid/LDL/BS/HgBA1c/BP goals reviewed  Reviewed diet, nutrition, exercise, weight management, BMI/goals, age/risk based screening recommendations, health maintenance & prevention counseling. Cancer screening USPTFS guidelines reviewed w/ pt today. Discussed benefits/positive/negative outcomes of screening based on age/risk stratification. Informed consent for/against screening based on pt's personal hx/risk factors. Updated in history above and health maintenance. She sees cardiologist Dr. Greer London q 6 months for SVT, last visit one month ago. He has referred her to his dietician and she will be starting a nutrition program there 1-9-2020 weekly, will be doing modified keto diet. Reviewed medications and side effects in detail  Specific diabetic recommendations: foot care discussed and Podiatry visits discussed, annual eye examinations at Ophthalmology discussed and long term diabetic complications discussed   Further follow up & other recommendations pending review of labs. If all remains good and stable, follow up in 6 months, sooner prn  If urine negative and HgBA1c controlled, she will follow up w/ her urologist about the recent urinary sx.

## 2019-12-18 NOTE — PATIENT INSTRUCTIONS
Medicare Wellness Visit, Female The best way to live healthy is to have a lifestyle where you eat a well-balanced diet, exercise regularly, limit alcohol use, and quit all forms of tobacco/nicotine, if applicable. Regular preventive services are another way to keep healthy. Preventive services (vaccines, screening tests, monitoring & exams) can help personalize your care plan, which helps you manage your own care. Screening tests can find health problems at the earliest stages, when they are easiest to treat. [facility] follows the current, evidence-based guidelines published by the [facility] ([facility]) when recommending preventive services for our patients. Because we follow these guidelines, sometimes recommendations change over time as research supports it. (For example, mammograms used to be recommended annually. Even though Medicare will still pay for an annual mammogram, the newer guidelines recommend a mammogram every two years for women of average risk). Of course, you and your doctor may decide to screen more often for some diseases, based on your risk and your co-morbidities (chronic disease you are already diagnosed with). Preventive services for you include: - Medicare offers their members a free annual wellness visit, which is time for you and your primary care provider to discuss and plan for your preventive service needs. Take advantage of this benefit every year! 
-All adults over the age of 72 should receive the recommended pneumonia vaccines. Current USPSTF guidelines recommend a series of two vaccines for the best pneumonia protection.  
-All adults should have a flu vaccine yearly and a tetanus vaccine every 10 years.  
-All adults age 48 and older should receive the shingles vaccines (series of two vaccines). -All adults age 38-68 who are overweight should have a diabetes screening test once every three years. -All adults born between [birth year] and 1967 should be screened once for Hepatitis C. 
-Other screening tests and preventive services for persons with diabetes include: an eye exam to screen for diabetic retinopathy, a kidney function test, a foot exam, and stricter control over your cholesterol.  
-Cardiovascular screening for adults with routine risk involves an electrocardiogram (ECG) at intervals determined by your doctor.  
-Colorectal cancer screenings should be done for adults age 54-65 with no increased risk factors for colorectal cancer. There are a number of acceptable methods of screening for this type of cancer. Each test has its own benefits and drawbacks. Discuss with your doctor what is most appropriate for you during your annual wellness visit. The different tests include: colonoscopy (considered the best screening method), a fecal occult blood test, a fecal DNA test, and sigmoidoscopy. 
 
-A bone mass density test is recommended when a woman turns 65 to screen for osteoporosis. This test is only recommended one time, as a screening. Some providers will use this same test as a disease monitoring tool if you already have osteoporosis. -Breast cancer screenings are recommended every other year for women of normal risk, age 54-69. 
-Cervical cancer screenings for women over age 72 are only recommended with certain risk factors. Here is a list of your current Health Maintenance items (your personalized list of preventive services) with a due date: 
Health Maintenance Due Topic Date Due  
 Hemoglobin A1C    12/12/2019  Glaucoma Screening   01/11/2020 [address] [place] [address] Eye Exam  01/11/2020

## 2019-12-19 LAB
ALT SERPL-CCNC: 13 IU/L (ref 0–32)
APPEARANCE UR: CLEAR
AST SERPL-CCNC: 13 IU/L (ref 0–40)
BACTERIA #/AREA URNS HPF: NORMAL /[HPF]
BILIRUB UR QL STRIP: NEGATIVE
BUN SERPL-MCNC: 18 MG/DL (ref 8–27)
BUN/CREAT SERPL: 25 (ref 12–28)
CALCIUM SERPL-MCNC: 9.2 MG/DL (ref 8.7–10.3)
CASTS URNS QL MICRO: NORMAL /LPF
CHLORIDE SERPL-SCNC: 104 MMOL/L (ref 96–106)
CHOLEST SERPL-MCNC: 144 MG/DL (ref 100–199)
CO2 SERPL-SCNC: 25 MMOL/L (ref 20–29)
COLOR UR: YELLOW
CREAT SERPL-MCNC: 0.73 MG/DL (ref 0.57–1)
EPI CELLS #/AREA URNS HPF: NORMAL /HPF (ref 0–10)
ERYTHROCYTE [DISTWIDTH] IN BLOOD BY AUTOMATED COUNT: 13.7 % (ref 12.3–15.4)
EST. AVERAGE GLUCOSE BLD GHB EST-MCNC: 137 MG/DL
GLUCOSE SERPL-MCNC: 111 MG/DL (ref 65–99)
GLUCOSE UR QL: NEGATIVE
HBA1C MFR BLD: 6.4 % (ref 4.8–5.6)
HCT VFR BLD AUTO: 42.7 % (ref 34–46.6)
HDLC SERPL-MCNC: 42 MG/DL
HGB BLD-MCNC: 13.5 G/DL (ref 11.1–15.9)
HGB UR QL STRIP: NEGATIVE
INTERPRETATION, 910389: NORMAL
KETONES UR QL STRIP: NEGATIVE
LDLC SERPL CALC-MCNC: 72 MG/DL (ref 0–99)
LEUKOCYTE ESTERASE UR QL STRIP: NEGATIVE
MCH RBC QN AUTO: 28.6 PG (ref 26.6–33)
MCHC RBC AUTO-ENTMCNC: 31.6 G/DL (ref 31.5–35.7)
MCV RBC AUTO: 91 FL (ref 79–97)
MICRO URNS: NORMAL
MICRO URNS: NORMAL
MUCOUS THREADS URNS QL MICRO: PRESENT
NITRITE UR QL STRIP: NEGATIVE
PH UR STRIP: 5 [PH] (ref 5–7.5)
PLATELET # BLD AUTO: 237 X10E3/UL (ref 150–450)
POTASSIUM SERPL-SCNC: 4.3 MMOL/L (ref 3.5–5.2)
PROT UR QL STRIP: NEGATIVE
RBC # BLD AUTO: 4.72 X10E6/UL (ref 3.77–5.28)
RBC #/AREA URNS HPF: NORMAL /HPF (ref 0–2)
SODIUM SERPL-SCNC: 144 MMOL/L (ref 134–144)
SP GR UR: 1.02 (ref 1–1.03)
TRIGL SERPL-MCNC: 149 MG/DL (ref 0–149)
URINALYSIS REFLEX, 377202: NORMAL
UROBILINOGEN UR STRIP-MCNC: 0.2 MG/DL (ref 0.2–1)
VLDLC SERPL CALC-MCNC: 30 MG/DL (ref 5–40)
WBC # BLD AUTO: 7.3 X10E3/UL (ref 3.4–10.8)
WBC #/AREA URNS HPF: NORMAL /HPF (ref 0–5)

## 2020-01-01 ENCOUNTER — PATIENT MESSAGE (OUTPATIENT)
Dept: FAMILY MEDICINE CLINIC | Age: 70
End: 2020-01-01

## 2020-02-10 RX ORDER — METFORMIN HYDROCHLORIDE 500 MG/1
TABLET, EXTENDED RELEASE ORAL
Qty: 90 TAB | Refills: 1 | Status: SHIPPED | OUTPATIENT
Start: 2020-02-10 | End: 2020-08-06

## 2020-02-10 RX ORDER — SIMVASTATIN 10 MG/1
TABLET, FILM COATED ORAL
Qty: 90 TAB | Refills: 1 | Status: SHIPPED | OUTPATIENT
Start: 2020-02-10 | End: 2020-08-06

## 2020-02-10 RX ORDER — METOPROLOL TARTRATE 25 MG/1
TABLET, FILM COATED ORAL
Qty: 180 TAB | Refills: 1 | Status: SHIPPED | OUTPATIENT
Start: 2020-02-10 | End: 2020-08-06

## 2020-02-10 RX ORDER — CLOPIDOGREL BISULFATE 75 MG/1
TABLET ORAL
Qty: 90 TAB | Refills: 1 | Status: SHIPPED | OUTPATIENT
Start: 2020-02-10 | End: 2020-08-06

## 2020-02-11 ENCOUNTER — TELEPHONE (OUTPATIENT)
Dept: FAMILY MEDICINE CLINIC | Age: 70
End: 2020-02-11

## 2020-02-11 NOTE — TELEPHONE ENCOUNTER
Approval thru cover my meds. The case # is [id number], effective 2/11/20 - 12/31/20.   Contact # [phone] to [facility]

## 2020-06-10 ENCOUNTER — VIRTUAL VISIT (OUTPATIENT)
Dept: FAMILY MEDICINE CLINIC | Age: 70
End: 2020-06-10

## 2020-06-10 DIAGNOSIS — E86.0 DEHYDRATION: ICD-10-CM

## 2020-06-10 DIAGNOSIS — I10 BENIGN ESSENTIAL HYPERTENSION: ICD-10-CM

## 2020-06-10 DIAGNOSIS — R42 POSTURAL DIZZINESS WITH PRESYNCOPE: ICD-10-CM

## 2020-06-10 DIAGNOSIS — R55 POSTURAL DIZZINESS WITH PRESYNCOPE: ICD-10-CM

## 2020-06-10 DIAGNOSIS — T67.9XXA HEAT EXPOSURE, INITIAL ENCOUNTER: Primary | ICD-10-CM

## 2020-06-10 DIAGNOSIS — E11.9 DIABETES MELLITUS TYPE 2, NONINSULIN DEPENDENT (HCC): ICD-10-CM

## 2020-06-10 NOTE — PROGRESS NOTES
VIRTUAL VISIT    Chief Complaint   Patient presents with    Dizziness     2 dizzy spells last week when she got overheated    Heat Exposure       HISTORY OF PRESENT ILLNESS   HPI  Patient seen via virtual visit today for 2 recent episodes of getting overheated and almost passing out. The first episode occurred last week on 6/3 when [place]/ [place]. It was 96 degrees and very humid that day. They ate their lunch and walked around the farm which was >100 yds and spent a fair amount of time out in the sun and heat. As they were walking back to the car, she could feel herself getting light headed, dizzy and she started seeing stars. She immediately stopped and slowly lowered herself onto the ground and laid down in the grass. One grand child went and got a nearby adult who drove his vehicle to her side. They assisted her into his air conditioned vehicle, gave her ice water and put a cool cloth on her head. Within about 15-20 minutes she began to feel much better. After 30 hrs she felt back to completely normal and was able to drive back home. She felt completely fine the rest of the day and for days after. The second episode occurred Sat 6/6 when she was sitting out in her back yard watching her grandkids in the pool. The heat index that day was 100. After sitting outside in the sun for about an hour, she could feel herself start to get hot, flushed, more sweaty, light headed and faint feeling. Her grand daughter walked over to her because she was sitting upright w/ her head leaning fwd and her eyes closed. Her grand daughter called out to her and asked if she was ok or wanted to call 911. Patient states she could hear everything going on around her and was able to respond by saying \"no, you dont need to call\". They got her  out of the house who helped her to her feet and walked her into the house.  She sat down in a cool air conditioned room, put a cool cloth on her head and she drank ice water. They checked her BP w/in about 10-15 minutes of being in the house and it was 86/68 (cant recall pulse). She rested quietly and continued to drink fluids. About 20 minutes later she started to feel much better again and was fine again throughout the rest of the day. Her repeat BP later that day was back up at 129/64 which is in her normal/usual range. She states she has had 2 TIA's in the past and that \"I know what those are like and these 2 spells were nothing like that, I know what to watch for and neither of those times did I have slurred speech, facial droop, extremity weakness, and my pupils were fine\". None of these spells were associated w/ any other cardio or neuro symptoms. In retrospect she realizes that she probably had not been drinking much fluids and also realizes that she shouldn't have been out in the sun on those hot days for so long. Since then she has been increasing her fluids, assuring that she gets in about 64 oz of water and has been avoiding excess heat. She has been feeling much better in general and has had no recurrences since then. Even yesterday she worked some in her yard in 91 degrees but assured she drank plenty of fluids prior. She takes her usual walks x 45 minutes ~ 3 x a week and stays very active in her yard and garden and has felt fine. She is eating healthier in general these days. Her wt is coming down. She does not check her BS's. Her BP's the past few days since all this have remained good and normal.  This week's readings:  133/72  152/74  126/69  139/74  123/79  137/70     REVIEW OF SYMPTOMS   Review of Systems   Constitutional: Negative. Negative for fever. Eyes: Negative. Respiratory: Negative. Cardiovascular: Negative. Negative for chest pain and palpitations. Gastrointestinal: Negative. Neurological: Negative for tingling, sensory change, speech change, focal weakness, seizures and loss of consciousness. PROBLEM LIST/MEDICAL HISTORY     Problem List  Date Reviewed: 6/10/2020          Codes Class Noted    Type 2 diabetes mellitus with peripheral vascular disease ([facility] ICD-10-CM: E11.51  ICD-9-CM: 250.70, 443.81  6/12/2019        SVT (supraventricular tachycardia) ([facility] ICD-10-CM: I47.1  ICD-9-CM: 427.89  1/20/2019    Overview Signed 1/20/2019  1:08 PM by Kenya Peres MD     Admitted to [facility] 12-25-18 for chest pressure and palpitations; medications adjusted, Cardio Dr Antony Mcdonough for Echo and stress test             Obesity, morbid ([facility] ICD-10-CM: E66.01  ICD-9-CM: 278.01  2/5/2018        Cataracts, bilateral ICD-10-CM: H26.9  ICD-9-CM: 366.9  2/3/2017    Overview Addendum 8/28/2017  8:32 AM by Kenya Peres MD     R>L, Dr Devon Cordoba, changing to Dr Dima Jimenez Fall 2017             TIA (transient ischemic attack) ICD-10-CM: G45.9  ICD-9-CM: 435.9  11/18/2016    Overview Signed 11/18/2016 11:02 AM by Kenya Peres MD     [address] [place] [address] 11/2/16-11/3/16, Neuro Dr Jl Finnegan             Diabetes mellitus type 2, noninsulin dependent ([facility] ICD-10-CM: E11.9  ICD-9-CM: 250.00  11/18/2016    Overview Signed 11/18/2016  1:01 PM by Kenya Peres MD     10/2014             ACP (advance care planning) ICD-10-CM: Z71.89  ICD-9-CM: V65.49  8/2/2016    Overview Signed 8/2/2016  8:36 AM by Kenya Peres MD     Patient has an AMD             Benign essential hypertension ICD-10-CM: I10  ICD-9-CM: 401.1  8/2/2016        Bee sting allergy with anaphylaxis reaction ICD-10-CM: Z91.030  ICD-9-CM: V15.06  7/27/2015        Arthritis of right foot & ankle ICD-10-CM: M19.071  ICD-9-CM: 716.97  5/21/2015    Overview Signed 5/21/2015 10:38 AM by Kenya Peres MD     Ortho Dr Rachana Crowe; used a boot x 2 months, off 2-2015             Right knee pain ICD-10-CM: M25.561  ICD-9-CM: 719.46  3/24/2014    Overview Signed 3/24/2014 11:45 AM by Kenya Peres MD     Jan 2014; CSI per Ortho Dr Fartun Bonilla [facility] office             Hypercholesterolemia ICD-10-CM: E78.00  ICD-9-CM: 272.0  1/3/2014        Vertebrobasilar artery syndrome ICD-10-CM: G45.0  ICD-9-CM: 435.3  2013    Overview Addendum 2013 12:53 PM by Hank Burr MD     ER; Neuro Dr Niharika Longoria; dc'd on ASA, Zocor and Metoprolol             Impaired fasting glucose ICD-10-CM: R73.01  ICD-9-CM: 790.21  4/3/2012    Overview Signed 4/3/2012  3:09 PM by Hank Burr MD     3/2012             Overactive bladder ICD-10-CM: S87.35  ICD-9-CM: 596.51  3/28/2011        S/P colonoscopy with polypectomy ICD-10-CM: Z98.890  ICD-9-CM: V45.89  Unknown    Overview Signed 3/29/2010 12:47 PM by Hank Burr MD     benign polyp, f/u 5yrs; Dr. Aasd Mehta              (normal spontaneous vaginal delivery) ICD-10-CM: O80  ICD-9-CM: 0  Unknown    Overview Signed 3/29/2010 12:48 PM by Hank Burr MD     A2, NSVDx3, SAB x2             S/P abdominal hysterectomy and left salpingo-oophorectomy ICD-10-CM: Z90.710, Z90.79, Z90.721  ICD-9-CM: V88.01, V45.77  Unknown    Overview Signed 3/29/2010 12:49 PM by Hank Burr MD     Uterine Fibroids; no HRT             S/P SCC excised from left hand ICD-10-CM: T93.940  ICD-9-CM: 173.62  Unknown    Overview Signed 3/29/2010 12:54 PM by Hank Burr MD     Lt hand, SCC excised, Dr. Daniel Tejada             Postmenopausal, LMP 36 yo, No HRT history ICD-10-CM: Z78.0  ICD-9-CM: V49.81  3/18/2010        Osteopenia ICD-10-CM: M85.80  ICD-9-CM: 733.90  10/3/2007                  PAST SURGICAL HISTORY     Past Surgical History:   Procedure Laterality Date    COLONOSCOPY  2006    q5yrs; Dr Asad Mehta; Diverticulosis, internal hemorrhoids, benign polyp    ECHO 2D ADULT      Normal    ELECTROCARDIOGRAM REPORT  ,     Normal    HM DEXA SCAN  10/2007    Osteopenia; spine -1.5, fem neck -1.1    HX CATARACT REM[facility] 2[facility]15/2018 and [facility]    HX CHOLECYSTECTOMY      HX COLONOSCOPY  9/19/2013, Dr Matthew Edward    Diverticulosis only, f/u 10 yrs    HX CYST REMOVAL Right 2017    2 cyss removed from right middle finger    HX HYSTERECTOMY  1980    [facility]; NO HRT    HX ORTHOPAEDIC      benign tumor removed from left wrist    HX ORTHOPAEDIC Right 1/13/16, Dr Genevieve Clayton    right foot pin/plate    HX OTHER SURGICAL      Dermatology :  Removal a melamona left side of face     HX TONSILLECTOMY  1950    T&A    LAP,CHOLECYSTECTOMY  9/92    TN COLONOSCOPY FLX DX W/COLLJ SPEC WHEN PFRMD  5/2008 , repeat 5 yrs    for rectal bleeding; hemorrhoids, diverticulae, small polyp         MEDICATIONS     Current Outpatient Medications   Medication Sig    vit A/vit C/vit E/zinc/copper (OCUVITE PRESERVISION PO) Take  by mouth daily.  solifenacin (VESICARE) 10 mg tablet TAKE ONE TABLET BY MOUTH ONE TIME DAILY    clopidogreL (PLAVIX) 75 mg tab TAKE ONE TABLET BY MOUTH ONE TIME DAILY    metoprolol tartrate (LOPRESSOR) 25 mg tablet TAKE ONE TABLET BY MOUTH EVERY 12 HOURS    simvastatin (ZOCOR) 10 mg tablet TAKE ONE TABLET BY MOUTH EVERY EVENING    metFORMIN ER (GLUCOPHAGE XR) 500 mg tablet TAKE ONE TABLET BY MOUTH ONE TIME DAILY WITH DINNER    doxycycline (PERIOSTAT) 20 mg tablet Take 40 mg by mouth daily. Per Derm for Rosacea    losartan-hydroCHLOROthiazide (HYZAAR) 100-25 mg per tablet Take 1 Tab by mouth daily. Changed from plain Losartan 100 mg daily per cardio 12-25-18    dilTIAZem CD (CARDIZEM CD) 180 mg ER capsule Take 1 Cap by mouth daily. Started per cardio 12-25-18 for SVT    calcium-cholecalciferol, D3, (CALTRATE 600+D) tablet Take 1 Tab by mouth daily.  L. acidoph & paracasei- S therm- Bifido (JULIETA-Q) 8 billion cell cap cap Take 1 Cap by mouth daily.  vitamin e 400 unit Tab Take  by mouth daily.  multivitamins-minerals-lutein (CENTRUM SILVER) Tab Take 1 Tab by mouth daily.     acetaminophen (TYLENOL) 650 mg CR tablet Take 1,300 mg by mouth daily as needed for Pain.  EPIPEN 2-MONICA 0.3 mg/0.3 mL (1:1,000) injection USE IMMEDIATELY AS NEEDED FOR ALLERGIC REACTION TO BEE STING     No current facility-administered medications for this visit. ALLERGIES     Allergies   Allergen Reactions    Oxycodone Hives, Nausea Only and Other (comments)    Ace Inhibitors Cough    Bee Sting [Sting, Bee] Swelling    Fish Oil Other (comments)     GI upset    Lisinopril Cough    Motrin [Ibuprofen] Swelling and Myalgia    Oxycodone-Acetaminophen Nausea and Vomiting    Pcn [Penicillins] Swelling    Promethazine-Dm Other (comments)     HA, nightmares          SOCIAL HISTORY     Social History     Socioeconomic History    Marital status:      Spouse name: Not on file    Number of children: 3    Years of education: Not on file    Highest education level: Not on file   Occupational History    Occupation: Fully Retired 2/2008    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     Employer: RETIRED   Tobacco Use    Smoking status: Never Smoker    Smokeless tobacco: Never Used   Substance and Sexual Activity    Alcohol use:  Yes     Alcohol/week: 1.7 standard drinks     Types: 2 Glasses of wine per week     Comment: 2 glasses of wine per week    Drug use: No    Sexual activity: Yes     Partners: Male   Other Topics Concern     Service No    Blood Transfusions No    Caffeine Concern No     Comment: 2 cups of coffee qam    Occupational Exposure No    Hobby Hazards No    Sleep Concern No    Stress Concern No    Special Diet No     Comment: just trying to make healthier food choices, reduced portions    Back Care No    Exercise No     Comment: walks 3-4 x a week x 45-60 minutes and stays active in her yard and garden   [facility] Yes    Seat Belt Yes    Self-Exams Yes   Social History Narrative    Patient has an AMD        IMMUNIZATIONS  Immunization History   Administered Date(s) Administered    (RETIRED) Pneumococcal Vaccine (Unspecified Type) 2009    Influenza High Dose Vaccine PF 2016, 2017, 10/30/2019    Influenza Vaccine 2015    Influenza Vaccine (Tri) Adjuvanted 2018, 10/30/2019    Influenza Vaccine Split 10/01/2012    Pneumococcal Conjugate (PCV-13) 2018    Pneumococcal Polysaccharide (PPSV-23) 2014, 02/10/2020    TD Vaccine 1999    TDAP Vaccine 2009    Td, Adsorbed PF 2015    Varicella Virus Vaccine Live 10/01/2011    Zoster 2011    Zoster Recombinant 05/10/2018, 2018         FAMILY HISTORY     Family History   Problem Relation Age of Onset    Thyroid Disease Mother         h/o Grave's dz;  age 79yo   [facility] Osteoporosis Mother    [facility] Delayed Awakening Mother    [facility] Stroke Father          age 80    Other Father         AAA age 76, macular degeneration    Alcohol abuse Father         smoker and drinker    Osteoporosis Sister     Other Son         gout    Gout Son     Dementia Maternal Grandmother         dementia and mental illness    Other Brother         heart murmur    Liver Disease Brother         hepatitis c    Anesth Problems Neg Hx          VITALS     Vitals limited due to virtual visit. Self reported data collected today:  Patient-Reported Vitals 6/10/2020   Patient-Reported Weight 236 lbs   Patient-Reported Height -   Patient-Reported Pulse 74   Patient-Reported Temperature -   Patient-Reported SpO2 -   Patient-Reported Systolic  731   Patient-Reported Diastolic 70   Patient-Reported Peak Flow -   Patient-Reported LMP -         PHYSICAL EXAMINATION   Physical Exam  Constitutional:       General: She is not in acute distress. Appearance: She is not ill-appearing. Cardiovascular:      Rate and Rhythm: Normal rate. Pulmonary:      Effort: Pulmonary effort is normal. No respiratory distress. Neurological:      General: No focal deficit present. Mental Status: She is alert and oriented to person, place, and time. Mental status is at baseline. Cranial Nerves: No facial asymmetry. Motor: Motor function is intact. Coordination: Coordination is intact. Coordination normal.      Gait: Gait normal.   Psychiatric:         Mood and Affect: Mood normal.         Speech: Speech normal.         Behavior: Behavior normal.         Thought Content:  Thought content normal.         Judgment: Judgment normal.             DIAGNOSTIC DATA         LABORATORY DATA     Results for orders placed or performed in visit on 12/18/19   CBC W/O DIFF   Result Value Ref Range    WBC 7.3 3.4 - 10.8 x10E3/uL    RBC 4.72 3.77 - 5.28 x10E6/uL    HGB 13.5 11.1 - 15.9 g/dL    HCT 42.7 34.0 - 46.6 %    MCV 91 79 - 97 fL    MCH 28.6 26.6 - 33.0 pg    MCHC 31.6 31.5 - 35.7 g/dL    RDW 13.7 12.3 - 15.4 %    PLATELET 447 027 - 495 x10E3/uL   LIPID PANEL   Result Value Ref Range    Cholesterol, total 144 100 - 199 mg/dL    Triglyceride 149 0 - 149 mg/dL    HDL Cholesterol 42 >39 mg/dL    VLDL, calculated 30 5 - 40 mg/dL    LDL, calculated 72 0 - 99 mg/dL   METABOLIC PANEL, BASIC   Result Value Ref Range    Glucose 111 (H) 65 - 99 mg/dL    BUN 18 8 - 27 mg/dL    Creatinine 0.73 0.57 - 1.00 mg/dL    GFR est non-AA 83 >59 mL/min/1.73    GFR est AA 96 >59 mL/min/1.73    BUN/Creatinine ratio 25 12 - 28    Sodium 144 134 - 144 mmol/L    Potassium 4.3 3.5 - 5.2 mmol/L    Chloride 104 96 - 106 mmol/L    CO2 25 20 - 29 mmol/L    Calcium 9.2 8.7 - 10.3 mg/dL   ALT   Result Value Ref Range    ALT (SGPT) 13 0 - 32 IU/L   AST   Result Value Ref Range    AST (SGOT) 13 0 - 40 IU/L   HEMOGLOBIN A1C WITH EAG   Result Value Ref Range    Hemoglobin A1c 6.4 (H) 4.8 - 5.6 %    Estimated average glucose 137 mg/dL   UA WITH REFLEX MICRO AND CULTURE   Result Value Ref Range    Specific Gravity 1.019 1.005 - 1.030    pH (UA) 5.0 5.0 - 7.5    Color Yellow Yellow    Appearance Clear Clear    Leukocyte Esterase Negative Negative    Protein Negative Negative/Trace    Glucose Negative Negative    Ketone Negative Negative    Blood Negative Negative    Bilirubin Negative Negative    Urobilinogen 0.2 0.2 - 1.0 mg/dL    Nitrites Negative Negative    Microscopic Examination Comment     Microscopic exam See additional order     URINALYSIS REFLEX Comment    MICROSCOPIC EXAMINATION   Result Value Ref Range    WBC 0-5 0 - 5 /hpf    RBC 0-2 0 - 2 /hpf    Epithelial cells 0-10 0 - 10 /hpf    Casts None seen None seen /lpf    Mucus Present Not Estab. Bacteria None seen None seen/Few   CVD REPORT   Result Value Ref Range    INTERPRETATION Note        Lab Results   Component Value Date/Time    Hemoglobin A1c 6.4 (H) 12/18/2019 09:30 AM    Hemoglobin A1c 6.3 (H) 06/12/2019 08:43 AM    Hemoglobin A1c 6.2 (H) 12/12/2018 08:53 AM    Glucose 111 (H) 12/18/2019 09:30 AM    Glucose (POC) 116 (H) 11/03/2016 11:52 AM    Microalb/Creat ratio (ug/mg creat.) <8.5 06/12/2019 08:43 AM    LDL, calculated 72 12/18/2019 09:30 AM    Creatinine 0.73 12/18/2019 09:30 AM      Lab Results   Component Value Date/Time    Cholesterol, total 144 12/18/2019 09:30 AM    HDL Cholesterol 42 12/18/2019 09:30 AM    LDL, calculated 72 12/18/2019 09:30 AM    Triglyceride 149 12/18/2019 09:30 AM    CHOL/HDL Ratio 3.5 11/02/2016 12:02 PM     Lab Results   Component Value Date/Time    TSH 1.460 05/07/2018 09:04 AM          ASSESSMENT & PLAN       ICD-10-CM ICD-9-CM    1. Heat exposure, initial encounter T67. 9XXA 992.9    2. Dehydration, Corrected w/ home oral rehydration and heat avoidance E86.0 276.51    3. Postural dizziness with presyncope, resolved w/ the above R42 780.4     R55 780.2    4. Benign essential hypertension, well controlled, stable, and goal range w/o persistent low bp's or heart rates I10 401.1    5.  Diabetes mellitus type 2, noninsulin dependent ([facility] controlled, stable E11.9 250.00      Patient seen via virtual visit today for 2 recent episodes of getting overheated, light headed and near syncope after prolonged, excess exposure to hot humid weather w/ low fluid intake. She is doing much better now that she has increased her water intake during these hotter days and minimizing her exposure to heat. Her hypertension and diabetes have otherwise been good and stable. Her home BP's and heart rates have since been good ranging in the 120's-130's/70's w/ heart rates in the 69-75 range which is all baseline for her. She is feeling much better and back to baseline  We reviewed all of her medications, effects, risks/benefits, precautions and possible side effects in detail  She is generally doing well on current regimen, no changes made at this time. Emphasized importance of staying adequately hydrated during these warmer/hotter months, especially in light of advancing age, cardiac/neuro hx and her current medications all of which lower her threshold for getting dehydrated and hypotensive. Recommended avoidance of excess or prolonged exposure to heat and that she take frequent breaks when doing her yard work or outdoor activities w/ her family and grandkids. She is in agreement that she shouldn't have done that and is now much more mindful of this. Heat exhaustion counseling at length w/ pt today and care instructions shared via AVS sent through her patient portal.   Next follow up due in 6 months for her annual wellness visit and fasting routine follow up w/ labs. Call back or follow up sooner in the interim prn    ----------------------------------------------------------------------------------------------------------------------------------------------------------  Patient is being evaluated by a video visit encounter for concerns as above. A caregiver was present when appropriate. Due to this being a TeleHealth encounter (During [id number]-[id number] public health emergency), evaluation of the following organ systems was limited: Vitals/Constitutional/EENT/Resp/CV/GI//MS/Neuro/Skin/Heme-Lymph-Imm.   Pursuant to the emergency declaration under the [address] [place] [address] authority and the [employer] and [employer] Act, this Virtual  Visit was conducted, with patient's (and/or legal guardian's) consent, to reduce the patient's risk of exposure to COVID-19 and provide necessary medical care. Services were provided through a video synchronous discussion virtually to substitute for in-person clinic visit. Patient was located at their own home. I was at home while conducting this encounter. Consent:  She and/or her healthcare decision maker is aware that this patient-initiated Telehealth encounter is a billable service, with coverage as determined by her insurance carrier. She is aware that she may receive a bill and has provided verbal consent to proceed: Yes  This virtual visit was conducted via [employer]. Pursuant to the emergency declaration under the [address] [place] [address], [address] [place] [address] authority and the [employer] and [employer] Act, this Virtual  Visit was conducted to reduce the patient's risk of exposure to COVID-19 and provide continuity of care for an established patient. Services were provided through a video synchronous discussion virtually to substitute for in-person clinic visit. Due to this being a TeleHealth evaluation, many elements of the physical examination are unable to be assessed. Total Time: minutes: 21-30 minutes.

## 2020-06-10 NOTE — PATIENT INSTRUCTIONS
Heat Exhaustion: Care Instructions Your Care Instructions Heat exhaustion occurs when you are hot, sweat a lot, and do not drink enough to replace the lost fluids. Heat exhaustion is not the same as heatstroke, which is much more serious. Heatstroke can lead to problems with many different organs and can be life-threatening. After medical care for heat exhaustion, you will need to limit your activities and take good care of your body while it recovers. Follow-up care is a key part of your treatment and safety. Be sure to make and go to all appointments, and call your doctor if you are having problems. It's also a good idea to know your test results and keep a list of the medicines you take. How can you care for yourself at home? · Reduce your activities, and get plenty of rest. Your doctor will give you instructions on when you can resume your normal schedule. · Stay in a cool room for at least the next 24 hours. · Drink rehydration drinks, juices, and water to replace fluids. Drinks such as sports drinks that contain electrolytes work best, because they have salt and minerals. You need salt and minerals as well as water. You are drinking enough fluids when your urine is normal in color (light yellow or clear), and you are urinating every 2 to 4 hours. If you have kidney, heart, or liver disease and have to limit fluids or salt, talk with your doctor before you increase your fluid or salt intake. · Avoid drinks that have caffeine or alcohol. To prevent heat exhaustion · Drink plenty of fluids, enough so that your urine is light yellow or clear like water. If you have kidney, heart, or liver disease and have to limit fluids, talk with your doctor before you increase the amount of fluids you drink. · Drink plenty of water before, during, and after you are active. This is very important when it is hot out and when you do intense exercise. · During hot weather, wear light-colored clothing that fits loosely and a hat with a brim to reflect the sun. · Limit or avoid strenuous activity during hot or humid weather, especially during the hottest part of the day (10 a.m. to 4 p.m.). Heat exhaustion and heatstroke usually develop when you are working or exercising in hot weather. Humidity makes hot weather even more dangerous. · Cars can get very hot inside. Open the windows or turn on the air conditioning before you get in and close the doors. · Try to stay cool during hot weather. If your home is not air-conditioned, seek an air-conditioned place. That could be in [employer], a neighborhood café, or a friend's home. [place] yourself with a cool mist. Take a cool shower, bath, or sponge bath. · Be aware that some medicines, such as major tranquilizers, can raise the risk of heat exhaustion. Ask your doctor whether any medicine you take raises your chance of getting heat exhaustion. When should you call for help? [id number] anytime you think you may need emergency care. For example, call if: 
· You feel very hot and: ? You have a seizure. ? You feel confused. ? Your skin is red, hot, and dry. ? You passed out (lost consciousness). Call your doctor now or seek immediate medical care if: 
· You cannot keep fluids down. · After returning to your normal activities, you have symptoms of heat exhaustion, such as sweating a lot, fatigue, dizziness, or nausea. Watch closely for changes in your health, and be sure to contact your doctor if: 
· You do not get better as expected. Where can you learn more? Go to http://malcom-sav.info/ Enter S222 in the search box to learn more about \"Heat Exhaustion: Care Instructions. \" Current as of: June 26, 2019               Content Version: 12.5 © 5746-5018 [employer].   
Care instructions adapted under license by [employer] (which disclaims liability or warranty for this information). If you have questions about a medical condition or this instruction, always ask your healthcare professional. [address] any warranty or liability for your use of this information.

## 2020-07-11 ENCOUNTER — PATIENT MESSAGE (OUTPATIENT)
Dept: FAMILY MEDICINE CLINIC | Age: 70
End: 2020-07-11

## 2020-07-13 RX ORDER — FLUCONAZOLE 150 MG/1
150 TABLET ORAL DAILY
Qty: 1 TAB | Refills: 0 | Status: SHIPPED | OUTPATIENT
Start: 2020-07-13 | End: 2020-07-14

## 2020-07-13 RX ORDER — NITROFURANTOIN 25; 75 MG/1; MG/1
100 CAPSULE ORAL 2 TIMES DAILY
Qty: 6 CAP | Refills: 0 | Status: SHIPPED | OUTPATIENT
Start: 2020-07-13 | End: 2020-07-16

## 2020-07-23 ENCOUNTER — PATIENT MESSAGE (OUTPATIENT)
Dept: FAMILY MEDICINE CLINIC | Age: 70
End: 2020-07-23

## 2020-07-24 RX ORDER — FLUCONAZOLE 150 MG/1
150 TABLET ORAL DAILY
Qty: 1 TAB | Refills: 0 | Status: SHIPPED | OUTPATIENT
Start: 2020-07-24 | End: 2020-07-25

## 2020-08-06 DIAGNOSIS — E78.00 HYPERCHOLESTEROLEMIA: ICD-10-CM

## 2020-08-06 DIAGNOSIS — G45.9 TIA (TRANSIENT ISCHEMIC ATTACK): ICD-10-CM

## 2020-08-06 DIAGNOSIS — I10 BENIGN ESSENTIAL HYPERTENSION: ICD-10-CM

## 2020-08-06 DIAGNOSIS — E11.9 DIABETES MELLITUS TYPE 2, NONINSULIN DEPENDENT (HCC): Primary | ICD-10-CM

## 2020-08-06 RX ORDER — METFORMIN HYDROCHLORIDE 500 MG/1
TABLET, EXTENDED RELEASE ORAL
Qty: 90 TAB | Refills: 1 | Status: SHIPPED | OUTPATIENT
Start: 2020-08-06 | End: 2021-02-02

## 2020-08-06 RX ORDER — CLOPIDOGREL BISULFATE 75 MG/1
TABLET ORAL
Qty: 90 TAB | Refills: 1 | Status: SHIPPED | OUTPATIENT
Start: 2020-08-06 | End: 2021-02-02

## 2020-08-06 RX ORDER — METOPROLOL TARTRATE 25 MG/1
TABLET, FILM COATED ORAL
Qty: 180 TAB | Refills: 1 | Status: SHIPPED | OUTPATIENT
Start: 2020-08-06 | End: 2021-02-02

## 2020-08-06 RX ORDER — SIMVASTATIN 10 MG/1
TABLET, FILM COATED ORAL
Qty: 90 TAB | Refills: 1 | Status: SHIPPED | OUTPATIENT
Start: 2020-08-06 | End: 2021-02-02

## 2020-12-02 ENCOUNTER — OFFICE VISIT (OUTPATIENT)
Dept: FAMILY MEDICINE CLINIC | Age: 70
End: 2020-12-02
Payer: MEDICARE

## 2020-12-02 VITALS
BODY MASS INDEX: 39.85 KG/M2 | SYSTOLIC BLOOD PRESSURE: 138 MMHG | HEIGHT: 65 IN | DIASTOLIC BLOOD PRESSURE: 86 MMHG | TEMPERATURE: 98.2 F | OXYGEN SATURATION: 98 % | HEART RATE: 65 BPM | RESPIRATION RATE: 18 BRPM | WEIGHT: 239.2 LBS

## 2020-12-02 DIAGNOSIS — Z00.00 MEDICARE ANNUAL WELLNESS VISIT, SUBSEQUENT: Primary | ICD-10-CM

## 2020-12-02 DIAGNOSIS — E78.00 HYPERCHOLESTEROLEMIA: ICD-10-CM

## 2020-12-02 DIAGNOSIS — I10 BENIGN ESSENTIAL HYPERTENSION: ICD-10-CM

## 2020-12-02 DIAGNOSIS — E66.01 OBESITY, MORBID (HCC): ICD-10-CM

## 2020-12-02 DIAGNOSIS — E11.9 DIABETES MELLITUS TYPE 2, NONINSULIN DEPENDENT (HCC): ICD-10-CM

## 2020-12-02 PROBLEM — E11.51 TYPE 2 DIABETES MELLITUS WITH PERIPHERAL VASCULAR DISEASE (HCC): Status: RESOLVED | Noted: 2019-06-12 | Resolved: 2020-12-02

## 2020-12-02 PROBLEM — I47.1 SVT (SUPRAVENTRICULAR TACHYCARDIA) (HCC): Status: RESOLVED | Noted: 2019-01-20 | Resolved: 2020-12-02

## 2020-12-02 PROCEDURE — 3017F COLORECTAL CA SCREEN DOC REV: CPT | Performed by: FAMILY MEDICINE

## 2020-12-02 PROCEDURE — 2022F DILAT RTA XM EVC RTNOPTHY: CPT | Performed by: FAMILY MEDICINE

## 2020-12-02 PROCEDURE — 3046F HEMOGLOBIN A1C LEVEL >9.0%: CPT | Performed by: FAMILY MEDICINE

## 2020-12-02 PROCEDURE — G8536 NO DOC ELDER MAL SCRN: HCPCS | Performed by: FAMILY MEDICINE

## 2020-12-02 PROCEDURE — 1101F PT FALLS ASSESS-DOCD LE1/YR: CPT | Performed by: FAMILY MEDICINE

## 2020-12-02 PROCEDURE — 99214 OFFICE O/P EST MOD 30 MIN: CPT | Performed by: FAMILY MEDICINE

## 2020-12-02 PROCEDURE — G8399 PT W/DXA RESULTS DOCUMENT: HCPCS | Performed by: FAMILY MEDICINE

## 2020-12-02 PROCEDURE — 1090F PRES/ABSN URINE INCON ASSESS: CPT | Performed by: FAMILY MEDICINE

## 2020-12-02 PROCEDURE — G8752 SYS BP LESS 140: HCPCS | Performed by: FAMILY MEDICINE

## 2020-12-02 PROCEDURE — G9899 SCRN MAM PERF RSLTS DOC: HCPCS | Performed by: FAMILY MEDICINE

## 2020-12-02 PROCEDURE — G0463 HOSPITAL OUTPT CLINIC VISIT: HCPCS | Performed by: FAMILY MEDICINE

## 2020-12-02 PROCEDURE — G8938 BMI DOC ONL FUP NT DOC: HCPCS | Performed by: FAMILY MEDICINE

## 2020-12-02 PROCEDURE — G0439 PPPS, SUBSEQ VISIT: HCPCS | Performed by: FAMILY MEDICINE

## 2020-12-02 PROCEDURE — G8510 SCR DEP NEG, NO PLAN REQD: HCPCS | Performed by: FAMILY MEDICINE

## 2020-12-02 PROCEDURE — G8427 DOCREV CUR MEDS BY ELIG CLIN: HCPCS | Performed by: FAMILY MEDICINE

## 2020-12-02 PROCEDURE — G8754 DIAS BP LESS 90: HCPCS | Performed by: FAMILY MEDICINE

## 2020-12-02 NOTE — ASSESSMENT & PLAN NOTE
Stable, based on history, physical exam and review of pertinent labs, studies and medications; meds reconciled; continue current treatment plan, lifestyle modifications recommended. Key Antihyperglycemic Medications             metFORMIN ER (GLUCOPHAGE XR) 500 mg tablet (Taking) TAKE ONE TABLET BY MOUTH ONE TIME DAILY WITH DINNER        Other Key Diabetic Medications             simvastatin (ZOCOR) 10 mg tablet (Taking) TAKE ONE TABLET BY MOUTH EVERY EVENING    losartan-hydroCHLOROthiazide (HYZAAR) 100-25 mg per tablet (Taking) Take 1 Tab by mouth daily.  Changed from plain Losartan 100 mg daily per cardio 12-25-18        Lab Results   Component Value Date/Time    Hemoglobin A1c 6.4 12/18/2019 09:30 AM    Glucose 111 12/18/2019 09:30 AM    Creatinine 0.73 12/18/2019 09:30 AM    Microalb/Creat ratio (ug/mg creat.) <8.5 06/12/2019 08:43 AM    Cholesterol, total 144 12/18/2019 09:30 AM    HDL Cholesterol 42 12/18/2019 09:30 AM    LDL, calculated 72 12/18/2019 09:30 AM    Triglyceride 149 12/18/2019 09:30 AM     Diabetic Foot and Eye Exam HM Status   Topic Date Due    Diabetic Foot Care  12/02/2021    Eye Exam  02/03/2022

## 2020-12-02 NOTE — ASSESSMENT & PLAN NOTE
Uncontrolled, based on history, physical exam and review of pertinent labs, studies and medications; meds reconciled; lifestyle modifications recommended.   Key Obesity Meds             metFORMIN ER (GLUCOPHAGE XR) 500 mg tablet (Taking) TAKE ONE TABLET BY MOUTH ONE TIME DAILY WITH DINNER        Lab Results   Component Value Date/Time    Hemoglobin A1c 6.4 12/18/2019 09:30 AM    Glucose 111 12/18/2019 09:30 AM    Cholesterol, total 144 12/18/2019 09:30 AM    HDL Cholesterol 42 12/18/2019 09:30 AM    LDL, calculated 72 12/18/2019 09:30 AM    Triglyceride 149 12/18/2019 09:30 AM    Sodium 144 12/18/2019 09:30 AM    Potassium 4.3 12/18/2019 09:30 AM    ALT (SGPT) 13 12/18/2019 09:30 AM

## 2020-12-02 NOTE — PROGRESS NOTES
Chief Complaint   Patient presents with   [facility] Annual Wellness Visit        1. Have you been to the ER, urgent care clinic since your last visit? Hospitalized since your last visit? No    2. Have you seen or consulted any other health care providers outside of the [address] [place] [address] since your last visit? Include any pap smears or colon screening. No    3 most recent PHQ Screens 12/2/2020   PHQ Not Done -   Little interest or pleasure in doing things Not at all   Feeling down, depressed, irritable, or hopeless Not at all   Total Score PHQ 2 0       Fall Risk Assessment, last 12 mths 12/2/2020   Able to walk? Yes   Fall in past 12 months? No   Fall with injury? -   Number of falls in past 12 months -   Fall Risk Score -               ADL Assessment 12/2/2020   Feeding yourself No Help Needed   Getting from bed to chair No Help Needed   Getting dressed No Help Needed   Bathing or showering No Help Needed   Walk across the room (includes cane/walker) No Help Needed   Using the telphone No Help Needed   Taking your medications No Help Needed   Preparing meals No Help Needed   Managing money (expenses/bills) No Help Needed   Moderately strenuous housework (laundry) No Help Needed   Shopping for personal items (toiletries/medicines) No Help Needed   Shopping for groceries No Help Needed   Driving No Help Needed   Climbing a flight of stairs No Help Needed   Getting to places beyond walking distances No Help Needed         Abuse Screening Questionnaire 12/2/2020   Do you ever feel afraid of your partner? N   Are you in a relationship with someone who physically or mentally threatens you? N   Is it safe for you to go home?  Francois Patel

## 2020-12-02 NOTE — PROGRESS NOTES
This is the Subsequent Medicare Annual Wellness Exam, performed 12 months or more after the Initial AWV or the last Subsequent AWV    I have reviewed the patient's medical history in detail and updated the computerized patient record. Depression Risk Factor Screening:     3 most recent PHQ Screens 12/2/2020   PHQ Not Done -   Little interest or pleasure in doing things Not at all   Feeling down, depressed, irritable, or hopeless Not at all   Total Score PHQ 2 0       Alcohol Risk Screen   Do you average more than 1 drink per night or more than 7 drinks a week:  No    On any one occasion in the past three months have you have had more than 3 drinks containing alcohol:  No        Functional Ability and Level of Safety:   Hearing: Hearing is good. Activities of Daily Living: The home contains: no safety equipment. Patient does total self care  ADL Assessment 12/2/2020   Feeding yourself No Help Needed   Getting from bed to chair No Help Needed   Getting dressed No Help Needed   Bathing or showering No Help Needed   Walk across the room (includes cane/walker) No Help Needed   Using the telphone No Help Needed   Taking your medications No Help Needed   Preparing meals No Help Needed   Managing money (expenses/bills) No Help Needed   Moderately strenuous housework (laundry) No Help Needed   Shopping for personal items (toiletries/medicines) No Help Needed   Shopping for groceries No Help Needed   Driving No Help Needed   Climbing a flight of stairs No Help Needed   Getting to places beyond walking distances No Help Needed        Ambulation: with no difficulty     Fall Risk:  Fall Risk Assessment, last 12 mths 12/2/2020   Able to walk? Yes   Fall in past 12 months?  No   Fall with injury? -   Number of falls in past 12 months -   Fall Risk Score -     Abuse Screen:  Patient is not abused       Cognitive Screening   Has your family/caregiver stated any concerns about your memory: no         Assessment/Plan Education and counseling provided:  Are appropriate based on today's review and evaluation    Diagnoses and all orders for this visit:    1.  Medicare annual wellness visit, subsequent        Health Maintenance Due     Health Maintenance Due   Topic Date Due    MICROALBUMIN Q1  2020       Patient Care Team   Patient Care Team:  Bettina Bailon MD as PCP - Michael Mojica MD as PCP - [facility] Provider  London Buenrostro MD (Cardiology)    History     Patient Active Problem List   Diagnosis Code    Postmenopausal, LMP 36 yo, No HRT history Z78.0    Osteopenia M85.80    S/P colonoscopy with polypectomy Z98.890     (normal spontaneous vaginal delivery) O80    S/P abdominal hysterectomy and left salpingo-oophorectomy Z90.710, Z90.79, Z90.721    S/P SCC excised from left hand C44.621    Overactive bladder N32.81    Impaired fasting glucose R73.01    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    Hypercholesterolemia E78.00    Right knee pain M25.561    Arthritis of right foot & ankle M19.071    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.030    ACP (advance care planning) Z71.89    Benign essential hypertension I10    TIA (transient ischemic attack) G45.9    Diabetes mellitus type 2, noninsulin dependent ([address] E11.9    Cataracts, bilateral H26.9    Obesity, morbid ([address] E66.01    SVT (supraventricular tachycardia) ([address] I47.1    Type 2 diabetes mellitus with peripheral vascular disease ([address] E11.51     Past Medical History:   Diagnosis Date    ACP (advance care planning) 2016    Patient has an AMD    Adverse effect of anesthesia     difficulty waking and cold postop    Arthritis     Hip    Arthritis of right foot & ankle 2015    Ortho Dr Kaitlynn Tobar; used a boot x 2 months, off     Bee sting allergy with anaphylaxis reaction 2015    Benign essential hypertension 2016    Candida intertrigo 3/29/2010    Cataracts, bilateral 2/3/2017 R>L, Dr Pena Fret    Diabetes mellitus type 2, noninsulin dependent ([facility] 2016    10/2014    Frequent UTI     Hx of complete eye exam 2018    [facility]    Hx of mammogram 2018    [facility] - No findings for malignancy identified-Yearly mammogram recommended     Hypercholesterolemia 1/3/2014     (normal spontaneous vaginal delivery)     A2, NSVDx3, SAB x2    Osteopenia 10/3/2007    Overactive bladder 3/28/2011    Pneumonia     Postmenopausal     No HRT    Postmenopausal, LMP 38 yo, No HRT history 3/18/2010    Recurrent cystitis 2014    Right knee pain 3/24/2014    2014; CSI per Ortho Dr Tang Walton office    Right shoulder pain 3/8/2012    S/P abdominal hysterectomy and left salpingo-oophorectomy     Uterine Fibroids; no HRT    S/P colonoscopy with polypectomy     benign polyp, f/u 5yrs; Dr. Johnathan Adler S/P SCC excised from left hand     SCC (squamous cell carcinoma), hand -    Lt hand, SCC excised, Dr. Ravin Hyde SVT (supraventricular tachycardia) ([facility] 2019    Admitted to [facility] for chest pressure and palpitations; medications adjusted, Cardio Dr Michelle Chavira for Echo and stress test    TIA (transient ischemic attack) 2013      Past Surgical History:   Procedure Laterality Date    COLONOSCOPY  2006    q5yrs; Dr Chelsea Sena; Diverticulosis, internal hemorrhoids, benign polyp    ECHO 2D ADULT      Normal    ELECTROCARDIOGRAM REPORT  ,     Normal     DEXA SCAN  10/2007    Osteopenia; spine -1.5, fem neck -1.1    HX CATARACT REMOVAL Bilateral 2/15/2018 and 2018    [facility]    HX CHOLECYSTECTOMY      HX COLONOSCOPY  2013, Dr Mone Hill    Diverticulosis only, f/u 10 yrs    HX CYST REMOVAL Right 2017    2 cyss removed from right middle finger    HX HYSTERECTOMY      [facility]; NO HRT    HX ORTHOPAEDIC      benign tumor removed from left wrist    HX ORTHOPAEDIC Right 1/13/16, Dr Leydi Christiansen    right foot pin/plate    HX OTHER SURGICAL      Dermatology :  Removal a melamona left side of face     HX TONSILLECTOMY  1950    T&A    LAP,CHOLECYSTECTOMY  9/92    TX COLONOSCOPY FLX DX W/COLLJ SPEC WHEN PFRMD  5/2008 , repeat 5 yrs    for rectal bleeding; hemorrhoids, diverticulae, small polyp     Current Outpatient Medications   Medication Sig Dispense Refill    clopidogreL (PLAVIX) 75 mg tab TAKE ONE TABLET BY MOUTH ONE TIME DAILY 90 Tab 1    metoprolol tartrate (LOPRESSOR) 25 mg tablet TAKE ONE TABLET BY MOUTH EVERY 12 HOURS 180 Tab 1    metFORMIN ER (GLUCOPHAGE XR) 500 mg tablet TAKE ONE TABLET BY MOUTH ONE TIME DAILY WITH DINNER 90 Tab 1    simvastatin (ZOCOR) 10 mg tablet TAKE ONE TABLET BY MOUTH EVERY EVENING 90 Tab 1    vit A/vit C/vit E/zinc/copper (OCUVITE PRESERVISION PO) Take  by mouth daily.  solifenacin (VESICARE) 10 mg tablet TAKE ONE TABLET BY MOUTH ONE TIME DAILY 90 Tab 3    doxycycline (PERIOSTAT) 20 mg tablet Take 40 mg by mouth daily. Per Derm for Rosacea  4    losartan-hydroCHLOROthiazide (HYZAAR) 100-25 mg per tablet Take 1 Tab by mouth daily. Changed from plain Losartan 100 mg daily per cardio 12-25-18      dilTIAZem CD (CARDIZEM CD) 180 mg ER capsule Take 1 Cap by mouth daily. Started per cardio 12-25-18 for SVT      calcium-cholecalciferol, D3, (CALTRATE 600+D) tablet Take 1 Tab by mouth daily.  acetaminophen (TYLENOL) 650 mg CR tablet Take 1,300 mg by mouth daily as needed for Pain.  EPIPEN 2-MONICA 0.3 mg/0.3 mL (1:1,000) injection USE IMMEDIATELY AS NEEDED FOR ALLERGIC REACTION TO BEE STING 2 Each 1    L. acidoph & paracasei- S therm- Bifido (JULIETA-Q) 8 billion cell cap cap Take 1 Cap by mouth daily. 10 Cap 0    vitamin e 400 unit Tab Take  by mouth daily.  multivitamins-minerals-lutein (CENTRUM SILVER) Tab Take 1 Tab by mouth daily.        Allergies   Allergen Reactions    Oxycodone Hives, Nausea Only and Other (comments)    Ace Inhibitors Cough    Bee Sting [Sting, Bee] Swelling    Fish Oil Other (comments)     GI upset    Lisinopril Cough    Motrin [Ibuprofen] Swelling and Myalgia    Oxycodone-Acetaminophen Nausea and Vomiting    Pcn [Penicillins] Swelling    Promethazine-Dm Other (comments)     HA, nightmares       Family History   Problem Relation Age of Onset    Thyroid Disease Mother         h/o Grave's dz;  age 79yo   [facility] Osteoporosis Mother    [facility] Delayed Awakening Mother    [facility] Stroke Father          age 80    Other Father         AAA age 76, macular degeneration    Alcohol abuse Father         smoker and drinker    Osteoporosis Sister     Other Son         gout    Gout Son     Dementia Maternal Grandmother         dementia and mental illness    Other Brother         heart murmur    Liver Disease Brother         hepatitis c    Anesth Problems Neg Hx      Social History     Tobacco Use    Smoking status: Never Smoker    Smokeless tobacco: Never Used   Substance Use Topics    Alcohol use:  Yes     Alcohol/week: 1.7 standard drinks     Types: 2 Glasses of wine per week     Comment: 2 glasses of wine per week

## 2020-12-02 NOTE — PROGRESS NOTES
Chief Complaint   Patient presents with    Annual Wellness Visit    Diabetes    Cholesterol Problem    Hypertension       HISTORY OF PRESENT ILLNESS   HPI  Follow up Type 2 NIDDM, Hypercholesterolemia, Hypertension. Trying to follow more sensible eating habits. No regular exercise since the end of the summer. Pleased that her weight is coming down  In general has been feeling really well  Complying w/ medication regimen, tolerating w/o reaction or side effects  Not monitoring home BS's, personal preference  Periodically checks BP's at home and typically runs in the 120's/80's range. Sees cardiologist annually for h/o SVT. Last seen by Dr. Marilee Cochran ~ 1 month ago. Stable. Reportedly no changes made at that time. States her BP there that day was good as well. No complaints at this time. REVIEW OF SYMPTOMS   Review of Systems   Constitutional: Negative. HENT: Negative. Eyes: Negative. Respiratory: Negative. Cardiovascular: Negative. Gastrointestinal: Negative. Genitourinary: Negative. Musculoskeletal: Negative for myalgias. Neurological: Negative. Negative for dizziness, tingling, sensory change, speech change, focal weakness and headaches. Endo/Heme/Allergies: Negative. Psychiatric/Behavioral: Negative.             PROBLEM LIST/MEDICAL HISTORY     Problem List  Date Reviewed: 12/2/2020          Codes Class Noted    Type 2 diabetes mellitus with peripheral vascular disease ([facility] ICD-10-CM: E11.51  ICD-9-CM: 250.70, 443.81  6/12/2019        SVT (supraventricular tachycardia) ([facility] ICD-10-CM: I47.1  ICD-9-CM: 427.89  1/20/2019    Overview Signed 1/20/2019  1:08 PM by Andry Soto MD     Admitted to [facility] EM[facility] 12-25-18 for chest pressure and palpitations; medications adjusted, Cardio Dr Marilee Cochran for Echo and stress test             Obesity, morbid ([facility] ICD-10-CM: E66.01  ICD-9-CM: 278.01  2/5/2018        Cataracts, bilateral ICD-10-CM: H26.9  ICD-9-CM: 366.9  2/3/2017    Overview Addendum 8/28/2017  8:32 AM by Rudi Escamilla MD     R>L, Dr Inna Faria, changing to Dr Tina Cornell             TIA (transient ischemic attack) ICD-10-CM: G45.9  ICD-9-CM: 435.9  11/18/2016    Overview Signed 11/18/2016 11:02 AM by Rudi Escamilla MD     [address] 11/2/16-11/3/16, Neuro Dr Garrett Branch             Diabetes mellitus type 2, noninsulin dependent ([facility] ICD-10-CM: E11.9  ICD-9-CM: 250.00  11/18/2016    Overview Signed 11/18/2016  1:01 PM by Rudi Escamilla MD     10/2014             ACP (advance care planning) ICD-10-CM: Z71.89  ICD-9-CM: V65.49  8/2/2016    Overview Signed 8/2/2016  8:36 AM by Rudi Escamilla MD     Patient has an AMD             Benign essential hypertension ICD-10-CM: I10  ICD-9-CM: 401.1  8/2/2016        Bee sting allergy with anaphylaxis reaction ICD-10-CM: Z91.030  ICD-9-CM: V15.06  7/27/2015        Arthritis of right foot & ankle ICD-10-CM: M19.071  ICD-9-CM: 716.97  5/21/2015    Overview Signed 5/21/2015 10:38 AM by Rudi Escamilla MD     Ortho Dr Jailyn Whiteside; used a boot x 2 months, off 2-2015             Right knee pain ICD-10-CM: M25.561  ICD-9-CM: 719.46  3/24/2014    Overview Signed 3/24/2014 11:45 AM by Rudi Escamilla MD     Jan 2014; CSI per Ortho Dr Heidy Fitzgerald office             Hypercholesterolemia ICD-10-CM: E78.00  ICD-9-CM: 272.0  1/3/2014        Vertebrobasilar artery syndrome ICD-10-CM: G45.0  ICD-9-CM: 435.3  6/13/2013    Overview Addendum 6/21/2013 12:53 PM by Rudi Escamilla MD     ER; Neuro Dr Magdalena Matthews; dc'd on ASA, Zocor and Metoprolol             Impaired fasting glucose ICD-10-CM: R73.01  ICD-9-CM: 790.21  4/3/2012    Overview Signed 4/3/2012  3:09 PM by Rudi Escamilla MD     3/2012             Overactive bladder ICD-10-CM: B96.12  ICD-9-CM: 596.51  3/28/2011        S/P colonoscopy with polypectomy ICD-10-CM: S22.086  ICD-9-CM: V45.89  Unknown    Overview Signed 3/29/2010 12:47 PM by Berta Tang MD     benign polyp, f/u 5yrs; Dr. Chelsea Sena              (normal spontaneous vaginal delivery) ICD-10-CM: O80  ICD-9-CM: 0  Unknown    Overview Signed 3/29/2010 12:48 PM by Berta Tang MD     Mindi Angel, NSVDx3, SAB x2             S/P abdominal hysterectomy and left salpingo-oophorectomy ICD-10-CM: Z90.710, Z90.79, Z90.721  ICD-9-CM: V88.01, V45.77  Unknown    Overview Signed 3/29/2010 12:49 PM by Berta Tang MD     Uterine Fibroids; no HRT             S/P SCC excised from left hand ICD-10-CM: C44.621  ICD-9-CM: 173.62  Unknown    Overview Signed 3/29/2010 12:54 PM by Berta Tang MD     Lt hand, SCC excised, Dr. Jackson Damian             Postmenopausal, LMP 36 yo, No HRT history ICD-10-CM: Z78.0  ICD-9-CM: V49.81  3/18/2010        Osteopenia ICD-10-CM: M85.80  ICD-9-CM: 733.90  10/3/2007            Past Medical History:   Diagnosis Date    ACP (advance care planning) 2016    Patient has an AMD    Adverse effect of anesthesia     difficulty waking and cold postop    Arthritis     Hip    Arthritis of right foot & ankle 2015    Ortho Dr Misael Gallo; used a boot x 2 months, off -    Bee sting allergy with anaphylaxis reaction 2015    Benign essential hypertension 2016    Candida intertrigo 3/29/2010    Cataracts, bilateral 2/3/2017    R>L, Dr Pena Fret    Diabetes mellitus type 2, noninsulin dependent ([address] 2016    10/2014    Frequent UTI     Hx of complete eye exam 2018    [facility]-18    Hx of mammogram 2018    [facility] - No findings for malignancy identified-Yearly mammogram recommended     Hypercholesterolemia 1/3/2014     (normal spontaneous vaginal delivery)     A2, NSVDx3, SAB x2    Osteopenia 10/3/2007    Overactive bladder 3/28/2011    Pneumonia     Postmenopausal     No HRT    Postmenopausal, LMP 36 yo, No HRT history 3/18/2010    Recurrent cystitis 11/14/2014    Right knee pain 3/24/2014    Jan 2014; CSI per Ortho Dr Josefina De Oliveira office    Right shoulder pain 3/8/2012    S/P abdominal hysterectomy and left salpingo-oophorectomy     Uterine Fibroids; no HRT    S/P colonoscopy with polypectomy 1/06    benign polyp, f/u 5yrs; Dr. Josse Hooper S/P SCC excised from left hand     SCC (squamous cell carcinoma), hand 2008-9    Lt hand, SCC excised, Dr. Helen Ibarra SVT (supraventricular tachycardia) ([facility] 1/20/2019    Admitted to [facility] 12-25-18 for chest pressure and palpitations; medications adjusted, Cardio Dr Renee Wade for Echo and stress test    TIA (transient ischemic attack) 6/2013           PAST SURGICAL HISTORY     Past Surgical History:   Procedure Laterality Date    COLONOSCOPY  1/2006    q5yrs; Dr Wiggins Gum; Diverticulosis, internal hemorrhoids, benign polyp    ECHO 2D ADULT  1997    Normal    ELECTROCARDIOGRAM REPORT  2010, 2009    Normal    HM DEXA SCAN  10/2007    Osteopenia; spine -1.5, fem neck -1.1    HX CATARACT REMOVAL Bilateral 2/15/2018 and 2/22/2018    [facility]    HX CHOLECYSTECTOMY      HX COLONOSCOPY  9/19/2013, Dr Brianne Wiggins    Diverticulosis only, f/u 10 yrs    HX CYST REMOVAL Right 2017    2 cyss removed from right middle finger    HX HYSTERECTOMY  1980    [facility]; NO HRT    HX ORTHOPAEDIC      benign tumor removed from left wrist    HX ORTHOPAEDIC Right 1/13/16, Dr Aly Ford    right foot pin/plate    HX OTHER SURGICAL      Dermatology :  Removal a melamona left side of face     HX TONSILLECTOMY  1950    T&A    LAP,CHOLECYSTECTOMY  9/92    VT COLONOSCOPY FLX DX W/COLLJ SPEC WHEN PFRMD  5/2008 , repeat 5 yrs    for rectal bleeding; hemorrhoids, diverticulae, small polyp         MEDICATIONS     Current Outpatient Medications   Medication Sig    clopidogreL (PLAVIX) 75 mg tab TAKE ONE TABLET BY MOUTH ONE TIME DAILY    metoprolol tartrate (LOPRESSOR) 25 mg tablet TAKE ONE TABLET BY MOUTH EVERY 12 HOURS    metFORMIN ER (GLUCOPHAGE XR) 500 mg tablet TAKE ONE TABLET BY MOUTH ONE TIME DAILY WITH DINNER    simvastatin (ZOCOR) 10 mg tablet TAKE ONE TABLET BY MOUTH EVERY EVENING    vit A/vit C/vit E/zinc/copper (OCUVITE PRESERVISION PO) Take  by mouth daily.  solifenacin (VESICARE) 10 mg tablet TAKE ONE TABLET BY MOUTH ONE TIME DAILY    doxycycline (PERIOSTAT) 20 mg tablet Take 40 mg by mouth daily. Per Derm for Rosacea    losartan-hydroCHLOROthiazide (HYZAAR) 100-25 mg per tablet Take 1 Tab by mouth daily. Changed from plain Losartan 100 mg daily per cardio 12-25-18    dilTIAZem CD (CARDIZEM CD) 180 mg ER capsule Take 1 Cap by mouth daily. Started per cardio 12-25-18 for SVT    calcium-cholecalciferol, D3, (CALTRATE 600+D) tablet Take 1 Tab by mouth daily.  acetaminophen (TYLENOL) 650 mg CR tablet Take 1,300 mg by mouth daily as needed for Pain.  EPIPEN 2-MONICA 0.3 mg/0.3 mL (1:1,000) injection USE IMMEDIATELY AS NEEDED FOR ALLERGIC REACTION TO BEE STING    L. acidoph & paracasei- S therm- Bifido (JULIETA-Q) 8 billion cell cap cap Take 1 Cap by mouth daily.  vitamin e 400 unit Tab Take  by mouth daily.  multivitamins-minerals-lutein (CENTRUM SILVER) Tab Take 1 Tab by mouth daily. No current facility-administered medications for this visit.            ALLERGIES     Allergies   Allergen Reactions    Oxycodone Hives, Nausea Only and Other (comments)    Ace Inhibitors Cough    Bee Sting [Sting, Bee] Swelling    Fish Oil Other (comments)     GI upset    Lisinopril Cough    Motrin [Ibuprofen] Swelling and Myalgia    Oxycodone-Acetaminophen Nausea and Vomiting    Pcn [Penicillins] Swelling    Promethazine-Dm Other (comments)     HA, nightmares          SOCIAL HISTORY     Social History     Socioeconomic History    Marital status:      Spouse name: Not on file    Number of children: 3    Years of education: Not on file    Highest education level: Not on file   Occupational History    Occupation: Fully Retired 2/2008  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     Employer: RETIRED   Tobacco Use    Smoking status: Never Smoker    Smokeless tobacco: Never Used   Substance and Sexual Activity    Alcohol use:  Yes     Alcohol/week: 1.7 standard drinks     Types: 2 Glasses of wine per week     Comment: 2 glasses of wine per week    Drug use: No    Sexual activity: Yes     Partners: Male   Other Topics Concern     Service No    Blood Transfusions No    Caffeine Concern No     Comment: 2 cups of coffee qam    Occupational Exposure No    Hobby Hazards No    Sleep Concern No    Stress Concern No    Weight Concern Yes     Comment: happy her wt is gradually coming down w/ just trying to follow more sensible eating habits    Special Diet No     Comment: just trying to make healthier food choices, reduced portions    Back Care No    Exercise No     Comment: not as much lately     Bike Helmet Yes    Seat Belt Yes    Self-Exams Yes   Social History Narrative    Patient has an AMD        IMMUNIZATIONS  Immunization History   Administered Date(s) Administered    (RETIRED) Pneumococcal Vaccine (Unspecified Type) 2009    Influenza High Dose Vaccine PF 2016, 2017, 10/30/2019    Influenza Vaccine 2015    Influenza Vaccine (Tri) Adjuvanted (>65 Yrs FLUAD TRI 25047) 2018, 10/30/2019    Influenza Vaccine Split 10/01/2012    Influenza, Quadrivalent, Adjuvanted (>65 Yrs FLUAD QUAD 71997) 2020    Pneumococcal Conjugate (PCV-13) 2018    Pneumococcal Polysaccharide (PPSV-23) 2014, 02/10/2020    TD Vaccine 1999    TDAP Vaccine 2009    Td, Adsorbed PF 2015    Varicella Virus Vaccine Live 10/01/2011    Zoster 2011    Zoster Recombinant 05/10/2018, 2018         FAMILY HISTORY     Family History   Problem Relation Age of Onset    Thyroid Disease Mother         h/o Grave's dz;  age 79yo   [address] [facility] Osteoporosis Mother     Delayed Awakening Mother     Stroke Father          age 80    Other Father         AAA age 76, macular degeneration    Alcohol abuse Father         smoker and drinker    Osteoporosis Sister     Other Son         gout    Gout Son     Dementia Maternal Grandmother         dementia and mental illness    Other Brother         heart murmur    Liver Disease Brother         hepatitis c    Anesth Problems Neg Hx          VITALS     Visit Vitals  /86   Pulse 65   Temp 98.2 °F (36.8 °C) (Temporal)   Resp 18   Ht 5' 5\" (1.651 m)   Wt 239 lb 3.2 oz (108.5 kg)   SpO2 98%   BMI 39.80 kg/m²          PHYSICAL EXAMINATION   Physical Exam  Vitals signs reviewed. Eyes:      General: No scleral icterus. Neck:      Musculoskeletal: Neck supple. Thyroid: No thyroid mass, thyromegaly or thyroid tenderness. Vascular: No carotid bruit. Cardiovascular:      Rate and Rhythm: Normal rate and regular rhythm. Pulses: Normal pulses. Dorsalis pedis pulses are 2+ on the right side and 2+ on the left side. Posterior tibial pulses are 2+ on the right side and 2+ on the left side. Heart sounds: No murmur. Pulmonary:      Effort: Pulmonary effort is normal.      Breath sounds: Normal breath sounds. Abdominal:      Palpations: Abdomen is soft. Tenderness: There is no abdominal tenderness. Musculoskeletal:         General: No swelling or tenderness. Feet:      Right foot:      Protective Sensation: 5 sites tested. 5 sites sensed. Skin integrity: Dry skin present. Toenail Condition: Right toenails are abnormally thick. Fungal disease present. Left foot:      Protective Sensation: 5 sites tested. 5 sites sensed. Skin integrity: Dry skin present. Toenail Condition: Left toenails are abnormally thick. Fungal disease present. Lymphadenopathy:      Cervical: No cervical adenopathy.              DIAGNOSTIC DATA         LABORATORY DATA     Results for orders placed or performed in visit on 12/18/19   CBC W/O DIFF   Result Value Ref Range    WBC 7.3 3.4 - 10.8 x10E3/uL    RBC 4.72 3.77 - 5.28 x10E6/uL    HGB 13.5 11.1 - 15.9 g/dL    HCT 42.7 34.0 - 46.6 %    MCV 91 79 - 97 fL    MCH 28.6 26.6 - 33.0 pg    MCHC 31.6 31.5 - 35.7 g/dL    RDW 13.7 12.3 - 15.4 %    PLATELET 514 359 - 227 x10E3/uL   LIPID PANEL   Result Value Ref Range    Cholesterol, total 144 100 - 199 mg/dL    Triglyceride 149 0 - 149 mg/dL    HDL Cholesterol 42 >39 mg/dL    VLDL, calculated 30 5 - 40 mg/dL    LDL, calculated 72 0 - 99 mg/dL   METABOLIC PANEL, BASIC   Result Value Ref Range    Glucose 111 (H) 65 - 99 mg/dL    BUN 18 8 - 27 mg/dL    Creatinine 0.73 0.57 - 1.00 mg/dL    GFR est non-AA 83 >59 mL/min/1.73    GFR est AA 96 >59 mL/min/1.73    BUN/Creatinine ratio 25 12 - 28    Sodium 144 134 - 144 mmol/L    Potassium 4.3 3.5 - 5.2 mmol/L    Chloride 104 96 - 106 mmol/L    CO2 25 20 - 29 mmol/L    Calcium 9.2 8.7 - 10.3 mg/dL   ALT   Result Value Ref Range    ALT (SGPT) 13 0 - 32 IU/L   AST   Result Value Ref Range    AST (SGOT) 13 0 - 40 IU/L   HEMOGLOBIN A1C WITH EAG   Result Value Ref Range    Hemoglobin A1c 6.4 (H) 4.8 - 5.6 %    Estimated average glucose 137 mg/dL   UA WITH REFLEX MICRO AND CULTURE    Specimen: Urine   Result Value Ref Range    Specific Gravity 1.019 1.005 - 1.030    pH (UA) 5.0 5.0 - 7.5    Color Yellow Yellow    Appearance Clear Clear    Leukocyte Esterase Negative Negative    Protein Negative Negative/Trace    Glucose Negative Negative    Ketone Negative Negative    Blood Negative Negative    Bilirubin Negative Negative    Urobilinogen 0.2 0.2 - 1.0 mg/dL    Nitrites Negative Negative    Microscopic Examination Comment     Microscopic exam See additional order     URINALYSIS REFLEX Comment    MICROSCOPIC EXAMINATION   Result Value Ref Range    WBC 0-5 0 - 5 /hpf    RBC 0-2 0 - 2 /hpf    Epithelial cells 0-10 0 - 10 /hpf    Casts None seen None seen /lpf    Mucus Present Not Estab.     Bacteria None seen None seen/Few   CVD REPORT   Result Value Ref Range    INTERPRETATION Note        Lab Results   Component Value Date/Time    Hemoglobin A1c 6.4 (H) 12/18/2019 09:30 AM    Hemoglobin A1c 6.3 (H) 06/12/2019 08:43 AM    Hemoglobin A1c 6.2 (H) 12/12/2018 08:53 AM    Glucose 111 (H) 12/18/2019 09:30 AM    Glucose (POC) 116 (H) 11/03/2016 11:52 AM    Microalb/Creat ratio (ug/mg creat.) <8.5 06/12/2019 08:43 AM    LDL, calculated 72 12/18/2019 09:30 AM    Creatinine 0.73 12/18/2019 09:30 AM          ASSESSMENT & PLAN       ICD-10-CM ICD-9-CM    1. Medicare annual wellness visit, subsequent  Z00.00 V70.0    2. Diabetes mellitus type 2, noninsulin dependent (HCC)  E11.9 250.00 CBC W/O DIFF      METABOLIC PANEL, COMPREHENSIVE      LIPID PANEL      HEMOGLOBIN A1C WITH EAG      MICROALBUMIN, UR, RAND W/ MICROALB/CREAT RATIO   3. Hypercholesterolemia  E78.00 272.0 LIPID PANEL   4. Benign essential hypertension  I10 401.1    5. Obesity, morbid ([facility]  G10.13 131.82 METABOLIC PANEL, COMPREHENSIVE      LIPID PANEL      TSH 3RD GENERATION   6. BMI 39.0-39.9,adult  Z68.39 V85.39      Diagnoses and all orders for this visit:    1. Medicare annual wellness visit, subsequent    2. Diabetes mellitus type 2, noninsulin dependent ([facility]  Assessment & Plan:  Stable, based on history, physical exam and review of pertinent labs, studies and medications; meds reconciled; continue current treatment plan, lifestyle modifications recommended. Key Antihyperglycemic Medications             metFORMIN ER (GLUCOPHAGE XR) 500 mg tablet (Taking) TAKE ONE TABLET BY MOUTH ONE TIME DAILY WITH DINNER        Other Key Diabetic Medications             simvastatin (ZOCOR) 10 mg tablet (Taking) TAKE ONE TABLET BY MOUTH EVERY EVENING    losartan-hydroCHLOROthiazide (HYZAAR) 100-25 mg per tablet (Taking) Take 1 Tab by mouth daily.  Changed from plain Losartan 100 mg daily per cardio 12-25-18        Lab Results   Component Value Date/Time    Hemoglobin A1c 6.4 12/18/2019 09:30 AM    Glucose 111 12/18/2019 09:30 AM    Creatinine 0.73 12/18/2019 09:30 AM    Microalb/Creat ratio (ug/mg creat.) <8.5 06/12/2019 08:43 AM    Cholesterol, total 144 12/18/2019 09:30 AM    HDL Cholesterol 42 12/18/2019 09:30 AM    LDL, calculated 72 12/18/2019 09:30 AM    Triglyceride 149 12/18/2019 09:30 AM     Diabetic Foot and Eye Exam HM Status   Topic Date Due    Diabetic Foot Care  12/02/2021    Eye Exam  02/03/2022       3. Hypercholesterolemia  -     LIPID PANEL; Future    4. Benign essential hypertension    5. Obesity, morbid ([address]  -     METABOLIC PANEL, COMPREHENSIVE; Future  -     LIPID PANEL; Future  -     TSH 3RD GENERATION; Future    6. BMI 39.0-39.9,adult  Assessment & Plan:  Uncontrolled, based on history, physical exam and review of pertinent labs, studies and medications; meds reconciled; lifestyle modifications recommended. Key Obesity Meds             metFORMIN ER (GLUCOPHAGE XR) 500 mg tablet (Taking) TAKE ONE TABLET BY MOUTH ONE TIME DAILY WITH DINNER        Lab Results   Component Value Date/Time    Hemoglobin A1c 6.4 12/18/2019 09:30 AM    Glucose 111 12/18/2019 09:30 AM    Cholesterol, total 144 12/18/2019 09:30 AM    HDL Cholesterol 42 12/18/2019 09:30 AM    LDL, calculated 72 12/18/2019 09:30 AM    Triglyceride 149 12/18/2019 09:30 AM    Sodium 144 12/18/2019 09:30 AM    Potassium 4.3 12/18/2019 09:30 AM    ALT (SGPT) 13 12/18/2019 09:30 AM             Diabetes care counseling. Diabetic diet/meal planning discussed. Diabetic foot care recommended per podiatry. Patient declines. Reviewed diet, nutrition, exercise, weight management, BMI/goals. Age/risk based screening recommendations, health maintenance & prevention counseling. Cancer screening USPTFS guidelines reviewed w/ pt today. Discussed benefits/positive/negative outcomes of screening based on age/risk stratification. Informed consent for/against screening based on pt's personal hx/risk factors. Updated in history above and health maintenance. Further follow up & other recommendations pending review of labs.

## 2020-12-07 ENCOUNTER — PATIENT MESSAGE (OUTPATIENT)
Dept: FAMILY MEDICINE CLINIC | Age: 70
End: 2020-12-07

## 2020-12-07 NOTE — TELEPHONE ENCOUNTER
From: Sherman Chou  To: Lindsey Vela MD  Sent: 12/7/2020 10:01 AM EST  Subject: Visit Follow-Up Question    The soonest I can get an 8am appointment for fasting blood work is Dec 17-8:45. I know Medicare is screaming that it needs to be done by Vivienne Salinas. Does that mean done or results back? Im fine with waiting if you are.    Thanks

## 2020-12-17 ENCOUNTER — APPOINTMENT (OUTPATIENT)
Dept: FAMILY MEDICINE CLINIC | Age: 70
End: 2020-12-17

## 2020-12-17 DIAGNOSIS — E78.00 HYPERCHOLESTEROLEMIA: ICD-10-CM

## 2020-12-17 DIAGNOSIS — E11.9 DIABETES MELLITUS TYPE 2, NONINSULIN DEPENDENT (HCC): ICD-10-CM

## 2020-12-17 DIAGNOSIS — E66.01 OBESITY, MORBID (HCC): ICD-10-CM

## 2020-12-17 LAB
ALBUMIN SERPL-MCNC: 3.6 G/DL (ref 3.5–5)
ALBUMIN/GLOB SERPL: 1.1 {RATIO} (ref 1.1–2.2)
ALP SERPL-CCNC: 118 U/L (ref 45–117)
ALT SERPL-CCNC: 16 U/L (ref 12–78)
ANION GAP SERPL CALC-SCNC: 7 MMOL/L (ref 5–15)
AST SERPL-CCNC: 13 U/L (ref 15–37)
BILIRUB SERPL-MCNC: 0.6 MG/DL (ref 0.2–1)
BUN SERPL-MCNC: 17 MG/DL (ref 6–20)
BUN/CREAT SERPL: 27 (ref 12–20)
CALCIUM SERPL-MCNC: 9.2 MG/DL (ref 8.5–10.1)
CHLORIDE SERPL-SCNC: 104 MMOL/L (ref 97–108)
CHOLEST SERPL-MCNC: 160 MG/DL
CO2 SERPL-SCNC: 31 MMOL/L (ref 21–32)
CREAT SERPL-MCNC: 0.63 MG/DL (ref 0.55–1.02)
CREAT UR-MCNC: 87.4 MG/DL
ERYTHROCYTE [DISTWIDTH] IN BLOOD BY AUTOMATED COUNT: 15.2 % (ref 11.5–14.5)
EST. AVERAGE GLUCOSE BLD GHB EST-MCNC: 123 MG/DL
GLOBULIN SER CALC-MCNC: 3.2 G/DL (ref 2–4)
GLUCOSE SERPL-MCNC: 106 MG/DL (ref 65–100)
HBA1C MFR BLD: 5.9 % (ref 4–5.6)
HCT VFR BLD AUTO: 42.6 % (ref 35–47)
HDLC SERPL-MCNC: 45 MG/DL
HDLC SERPL: 3.6 {RATIO} (ref 0–5)
HGB BLD-MCNC: 13.1 G/DL (ref 11.5–16)
LDLC SERPL CALC-MCNC: 83.2 MG/DL (ref 0–100)
LIPID PROFILE,FLP: ABNORMAL
MCH RBC QN AUTO: 28.4 PG (ref 26–34)
MCHC RBC AUTO-ENTMCNC: 30.8 G/DL (ref 30–36.5)
MCV RBC AUTO: 92.4 FL (ref 80–99)
MICROALBUMIN UR-MCNC: 1.01 MG/DL
MICROALBUMIN/CREAT UR-RTO: 12 MG/G (ref 0–30)
NRBC # BLD: 0 K/UL (ref 0–0.01)
NRBC BLD-RTO: 0 PER 100 WBC
PLATELET # BLD AUTO: 245 K/UL (ref 150–400)
PMV BLD AUTO: 10.9 FL (ref 8.9–12.9)
POTASSIUM SERPL-SCNC: 3.9 MMOL/L (ref 3.5–5.1)
PROT SERPL-MCNC: 6.8 G/DL (ref 6.4–8.2)
RBC # BLD AUTO: 4.61 M/UL (ref 3.8–5.2)
SODIUM SERPL-SCNC: 142 MMOL/L (ref 136–145)
TRIGL SERPL-MCNC: 159 MG/DL (ref ?–150)
TSH SERPL DL<=0.05 MIU/L-ACNC: 1.76 UIU/ML (ref 0.36–3.74)
VLDLC SERPL CALC-MCNC: 31.8 MG/DL
WBC # BLD AUTO: 6.9 K/UL (ref 3.6–11)

## 2020-12-27 ENCOUNTER — PATIENT MESSAGE (OUTPATIENT)
Dept: FAMILY MEDICINE CLINIC | Age: 70
End: 2020-12-27

## 2021-02-02 DIAGNOSIS — E11.9 DIABETES MELLITUS TYPE 2, NONINSULIN DEPENDENT (HCC): ICD-10-CM

## 2021-02-02 DIAGNOSIS — E78.00 HYPERCHOLESTEROLEMIA: ICD-10-CM

## 2021-02-02 DIAGNOSIS — G45.9 TIA (TRANSIENT ISCHEMIC ATTACK): ICD-10-CM

## 2021-02-02 DIAGNOSIS — I10 BENIGN ESSENTIAL HYPERTENSION: ICD-10-CM

## 2021-02-02 RX ORDER — CLOPIDOGREL BISULFATE 75 MG/1
TABLET ORAL
Qty: 90 TAB | Refills: 3 | Status: SHIPPED | OUTPATIENT
Start: 2021-02-02 | End: 2022-02-01

## 2021-02-02 RX ORDER — SIMVASTATIN 10 MG/1
TABLET, FILM COATED ORAL
Qty: 90 TAB | Refills: 3 | Status: SHIPPED | OUTPATIENT
Start: 2021-02-02 | End: 2022-02-01

## 2021-02-02 RX ORDER — METOPROLOL TARTRATE 25 MG/1
TABLET, FILM COATED ORAL
Qty: 180 TAB | Refills: 3 | Status: SHIPPED | OUTPATIENT
Start: 2021-02-02 | End: 2022-02-01

## 2021-02-02 RX ORDER — METFORMIN HYDROCHLORIDE 500 MG/1
TABLET, EXTENDED RELEASE ORAL
Qty: 90 TAB | Refills: 3 | Status: SHIPPED | OUTPATIENT
Start: 2021-02-02 | End: 2021-05-17 | Stop reason: ALTCHOICE

## 2021-02-10 ENCOUNTER — VIRTUAL VISIT (OUTPATIENT)
Dept: FAMILY MEDICINE CLINIC | Age: 71
End: 2021-02-10
Payer: MEDICARE

## 2021-02-10 DIAGNOSIS — K58.0 IRRITABLE BOWEL SYNDROME WITH DIARRHEA: Primary | ICD-10-CM

## 2021-02-10 DIAGNOSIS — K21.9 GASTROESOPHAGEAL REFLUX DISEASE, UNSPECIFIED WHETHER ESOPHAGITIS PRESENT: ICD-10-CM

## 2021-02-10 DIAGNOSIS — F43.9 STRESS: ICD-10-CM

## 2021-02-10 PROCEDURE — G9899 SCRN MAM PERF RSLTS DOC: HCPCS | Performed by: FAMILY MEDICINE

## 2021-02-10 PROCEDURE — G0463 HOSPITAL OUTPT CLINIC VISIT: HCPCS | Performed by: FAMILY MEDICINE

## 2021-02-10 PROCEDURE — G8399 PT W/DXA RESULTS DOCUMENT: HCPCS | Performed by: FAMILY MEDICINE

## 2021-02-10 PROCEDURE — 1090F PRES/ABSN URINE INCON ASSESS: CPT | Performed by: FAMILY MEDICINE

## 2021-02-10 PROCEDURE — G8427 DOCREV CUR MEDS BY ELIG CLIN: HCPCS | Performed by: FAMILY MEDICINE

## 2021-02-10 PROCEDURE — G8756 NO BP MEASURE DOC: HCPCS | Performed by: FAMILY MEDICINE

## 2021-02-10 PROCEDURE — 99213 OFFICE O/P EST LOW 20 MIN: CPT | Performed by: FAMILY MEDICINE

## 2021-02-10 PROCEDURE — 1101F PT FALLS ASSESS-DOCD LE1/YR: CPT | Performed by: FAMILY MEDICINE

## 2021-02-10 PROCEDURE — 3017F COLORECTAL CA SCREEN DOC REV: CPT | Performed by: FAMILY MEDICINE

## 2021-02-10 PROCEDURE — G8510 SCR DEP NEG, NO PLAN REQD: HCPCS | Performed by: FAMILY MEDICINE

## 2021-02-10 RX ORDER — FAMOTIDINE 40 MG/1
40 TABLET, FILM COATED ORAL
Qty: 90 TAB | Refills: 0 | Status: SHIPPED | OUTPATIENT
Start: 2021-02-10 | End: 2021-05-22

## 2021-02-10 NOTE — PROGRESS NOTES
VIRTUAL VISIT    Patient seen via virtual visit today for the following:    Chief Complaint   Patient presents with    Diarrhea     intermittent x 1 month    Indigestion     x 1 month    Stress       HISTORY OF PRESENT ILLNESS   HPI  Patient seen via virtual visit today to discuss recent GI issues. For the past month she has having indigestion, acid reflux, belching, burping. Has been taking Mylanta prn w/ only slight relief. Also recently having some occasional bouts of loose stools/diarrhea. She got worried about Covid so she went and had a Covid test done which was negative. No recent travel or known exposures but was relieved that it was ok because she has been totally stressed out about it. Symptoms are not daily but happen sporadically maybe a few days a week. The bouts usually only happen once in a day, rarely more than 2-3 x. No pattern but more noticeable when she is stressed or after eating large or less healthy meals. The symptoms are usually preceded by mild lower abdominal cramping then relieved after the loose stool. She has been taking Imodium prn. Also this week she started on a new probiotic as recommended by her Pharmacist. That has seemed to help some. Stools the past week have been more regular and formed again and she thought all was well until the most recent occurrence yesterday morning after breakfast, she got some lower abdominal cramping and diarrhea. Aside from the stress related GI issues she is feeling well. States she has had \"stomach issues\" all her life and has always carried stress \"in her stomach\". She was diagnosed w/ IBS at one point. But the last time she had issues like this was about 12 yrs ago right when she retired when she was going through a stressful time. She used to take Imodium a lot back then and it eventually went away. She had not had any issues for years up until now.  She admits she is going through a really stressful time right now: Covid pandemic has really taken a toll on her mentally, worrying about not being able to get the vaccine, a close friend is ill, not being able to go to the [facility] for exercise, cant be around her grandchildren. She knows this will pass and she is trying to do things to keep herself busy and active until she is able to be out and about more, get back to exercising and spending time w/ her family like before. REVIEW OF SYMPTOMS   Review of Systems   Constitutional: Negative for chills, diaphoresis, fever, malaise/fatigue and weight loss. Gastrointestinal: Negative for abdominal pain, blood in stool, melena, nausea and vomiting. Genitourinary: Negative.             PROBLEM LIST/MEDICAL HISTORY     Problem List  Date Reviewed: 2/10/2021          Codes Class Noted    Severe obesity ([facility] [[email or web address] 75.) ICD-10-CM: E66.01  ICD-9-CM: 278.01  12/2/2020        Cataracts, bilateral ICD-10-CM: H26.9  ICD-9-CM: 366.9  2/3/2017    Overview Addendum 8/28/2017  8:32 AM by Jesus Lo MD     R>L, Dr Susie Downs, changing to Dr Ben Wilde Fall 2017             TIA (transient ischemic attack) ICD-10-CM: G45.9  ICD-9-CM: 435.9  11/18/2016    Overview Signed 11/18/2016 11:02 AM by Jesus Lo MD     [address] 11/2/16-11/3/16, Neuro Dr Tye Melchor             Diabetes mellitus type 2, noninsulin dependent ([facility] [[email or web address]] 75.) ICD-10-CM: E11.9  ICD-9-CM: 250.00  11/18/2016    Overview Signed 11/18/2016  1:01 PM by Jesus Lo MD     10/2014             ACP (advance care planning) ICD-10-CM: Z71.89  ICD-9-CM: V65.49  8/2/2016    Overview Signed 8/2/2016  8:36 AM by Jesus Lo MD     Patient has an AMD             Benign essential hypertension ICD-10-CM: I10  ICD-9-CM: 401.1  8/2/2016        Bee sting allergy with anaphylaxis reaction ICD-10-CM: Z91.030  ICD-9-CM: V15.06  7/27/2015        Arthritis of right foot & ankle ICD-10-CM: M19.071  ICD-9-CM: 716.97  5/21/2015    Overview Signed 5/21/2015 10:38 AM by Jesus Lo, MD     Ortho  [address]; used a boot x 2 months, off              Right knee pain ICD-10-CM: M25.561  ICD-9-CM: 719.46  3/24/2014    Overview Signed 3/24/2014 11:45 AM by Kenya Peres MD     2014; CSI per Ortho Dr Verenice Martino office             Hypercholesterolemia ICD-10-CM: E78.00  ICD-9-CM: 272.0  1/3/2014        Vertebrobasilar artery syndrome ICD-10-CM: G45.0  ICD-9-CM: 435.3  2013    Overview Addendum 2013 12:53 PM by Kneya Peres MD     ER; Neuro Dr Jimenez Rodriguez; dc'd on ASA, Zocor and Metoprolol             Impaired fasting glucose ICD-10-CM: R73.01  ICD-9-CM: 790.21  4/3/2012    Overview Signed 4/3/2012  3:09 PM by Kenya Peres MD     3/2012             Overactive bladder ICD-10-CM: O23.44  ICD-9-CM: 596.51  3/28/2011        S/P colonoscopy with polypectomy ICD-10-CM: V69.331  ICD-9-CM: V45.89  Unknown    Overview Signed 3/29/2010 12:47 PM by Kenya Peres MD     benign polyp, f/u 5yrs; Dr. Kristian Bunn              (normal spontaneous vaginal delivery) ICD-10-CM: O80  ICD-9-CM: 0  Unknown    Overview Signed 3/29/2010 12:48 PM by Kenya Peres MD     A2, NSVDx3, SAB x2             S/P abdominal hysterectomy and left salpingo-oophorectomy ICD-10-CM: Z90.710, Z90.79, Z90.721  ICD-9-CM: V88.01, V45.77  Unknown    Overview Signed 3/29/2010 12:49 PM by Kenya Peres MD     Uterine Fibroids; no HRT             S/P SCC excised from left hand ICD-10-CM: G97.727  ICD-9-CM: 173.62  Unknown    Overview Signed 3/29/2010 12:54 PM by Kenya Peres MD     Lt hand, SCC excised, Dr. Haresh Rodriguez             Postmenopausal, LMP 38 yo, No HRT history ICD-10-CM: Z78.0  ICD-9-CM: V49.81  3/18/2010        Osteopenia ICD-10-CM: M85.80  ICD-9-CM: 733.90  10/3/2007                  PAST SURGICAL HISTORY     Past Surgical History:   Procedure Laterality Date    COLONOSCOPY  2006    q5yrs; Dr Kristian Bunn; Diverticulosis, internal hemorrhoids, benign polyp    ECHO 2D ADULT  1997    Normal    ELECTROCARDIOGRAM REPORT  2010, 2009    Normal     DEXA SCAN  10/2007    Osteopenia; spine -1.5, fem neck -1.1    HX CATARACT REMOVAL Bilateral 2/15/2018 and 2/22/2018    [facility]    HX CHOLECYSTECTOMY      HX COLONOSCOPY  09/19/2013    Diverticulosis only, f/u 10 yrs, Dr Jennifer Trejo Right 2017    2 cyss removed from right middle finger    HX HYSTERECTOMY  1980    [facility]; NO HRT    HX ORTHOPAEDIC      benign tumor removed from left wrist    HX ORTHOPAEDIC Right 1/13/16, Dr Junior Hastings    right foot pin/plate    HX OTHER SURGICAL      Dermatology :  Removal a melamona left side of face     HX TONSILLECTOMY  1950    T&A    VT COLONOSCOPY FLX DX W/COLLJ SPEC WHEN PFRMD  5/2008 , repeat 5 yrs    for rectal bleeding; hemorrhoids, diverticulae, small polyp    VT LAP,CHOLECYSTECTOMY  9/92         MEDICATIONS     Current Outpatient Medications   Medication Sig    clopidogreL (PLAVIX) 75 mg tab TAKE ONE TABLET BY MOUTH ONE TIME DAILY    metFORMIN ER (GLUCOPHAGE XR) 500 mg tablet TAKE ONE TABLET BY MOUTH ONE TIME DAILY WITH DINNER    metoprolol tartrate (LOPRESSOR) 25 mg tablet TAKE ONE TABLET BY MOUTH EVERY 12 HOURS    simvastatin (ZOCOR) 10 mg tablet TAKE ONE TABLET BY MOUTH EVERY EVENING    vit A/vit C/vit E/zinc/copper (OCUVITE PRESERVISION PO) Take  by mouth daily.  solifenacin (VESICARE) 10 mg tablet TAKE ONE TABLET BY MOUTH ONE TIME DAILY    doxycycline (PERIOSTAT) 20 mg tablet Take 40 mg by mouth daily. Per Derm for Rosacea    losartan-hydroCHLOROthiazide (HYZAAR) 100-25 mg per tablet Take 1 Tab by mouth daily. Changed from plain Losartan 100 mg daily per cardio 12-25-18    dilTIAZem CD (CARDIZEM CD) 180 mg ER capsule Take 1 Cap by mouth daily. Started per cardio 12-25-18 for SVT    calcium-cholecalciferol, D3, (CALTRATE 600+D) tablet Take 1 Tab by mouth daily.     acetaminophen (TYLENOL) 650 mg CR tablet Take 1,300 mg by mouth daily as needed for Pain.  EPIPEN 2-MONICA 0.3 mg/0.3 mL (1:1,000) injection USE IMMEDIATELY AS NEEDED FOR ALLERGIC REACTION TO BEE STING    L. acidoph & paracasei- S therm- Bifido (JULIETA-Q) 8 billion cell cap cap Take 1 Cap by mouth daily.  vitamin e 400 unit Tab Take  by mouth daily.  multivitamins-minerals-lutein (CENTRUM SILVER) Tab Take 1 Tab by mouth daily. No current facility-administered medications for this visit. ALLERGIES     Allergies   Allergen Reactions    Oxycodone Hives, Nausea Only and Other (comments)    Ace Inhibitors Cough    Bee Sting [Sting, Bee] Swelling    Fish Oil Other (comments)     GI upset    Lisinopril Cough    Motrin [Ibuprofen] Swelling and Myalgia    Oxycodone-Acetaminophen Nausea and Vomiting    Pcn [Penicillins] Swelling    Promethazine-Dm Other (comments)     HA, nightmares          SOCIAL HISTORY     Social History     Tobacco Use    Smoking status: Never Smoker    Smokeless tobacco: Never Used   Substance Use Topics    Alcohol use:  Yes     Alcohol/week: 1.7 standard drinks     Types: 2 Glasses of wine per week     Comment: 2 glasses of wine per week     Social History     Social History Narrative    Patient has an AMD     Social History     Substance and Sexual Activity   Sexual Activity Yes    Partners: Male       IMMUNIZATIONS     Immunization History   Administered Date(s) Administered    (RETIRED) Pneumococcal Vaccine (Unspecified Type) 02/13/2009    Influenza High Dose Vaccine PF 11/18/2016, 09/26/2017, 10/30/2019    Influenza Vaccine 11/08/2015    Influenza Vaccine (Tri) Adjuvanted (>65 Yrs FLUAD TRI 91327) 09/20/2018, 10/30/2019    Influenza Vaccine Split 10/01/2012    Influenza, Quadrivalent, Adjuvanted (>65 Yrs FLUAD QUAD 51386) 09/04/2020    Pneumococcal Conjugate (PCV-13) 12/12/2018    Pneumococcal Polysaccharide (PPSV-23) 03/24/2014, 02/10/2020    TD Vaccine 05/07/1999    TDAP Vaccine 02/13/2009    Td, Adsorbed PF 2015    Varicella Virus Vaccine Live 10/01/2011    Zoster 2011    Zoster Recombinant 05/10/2018, 2018         FAMILY HISTORY     Family History   Problem Relation Age of Onset    Thyroid Disease Mother         h/o Grave's dz;  age 81yo   Sneha Curl Osteoporosis Mother    Sneha Curl Delayed Awakening Mother    Sneha Curl Stroke Father          age 80    Other Father         AAA age 76, macular degeneration    Alcohol abuse Father         smoker and drinker    Osteoporosis Sister     Other Son         gout    Gout Son     Dementia Maternal Grandmother         dementia and mental illness    Other Brother         heart murmur    Liver Disease Brother         hepatitis c    Anesth Problems Neg Hx          VITALS   There were no vitals taken for today's virtual visit. PHYSICAL EXAMINATION   Physical Exam  Constitutional:       General: She is not in acute distress. Appearance: She is not ill-appearing.                 LABORATORY DATA/ANCILLARY/IMAGING     Results for orders placed or performed in visit on 20   MICROALBUMIN, UR, RAND W/ MICROALB/CREAT RATIO   Result Value Ref Range    Microalbumin,urine random 1.01 MG/DL    Creatinine, urine 87.40 mg/dL    Microalbumin/Creat ratio (mg/g creat) 12 0 - 30 mg/g   HEMOGLOBIN A1C WITH EAG   Result Value Ref Range    Hemoglobin A1c 5.9 (H) 4.0 - 5.6 %    Est. average glucose 123 mg/dL   TSH 3RD GENERATION   Result Value Ref Range    TSH 1.76 0.36 - 3.74 uIU/mL   LIPID PANEL   Result Value Ref Range    LIPID PROFILE          Cholesterol, total 160 <200 MG/DL    Triglyceride 159 (H) <150 MG/DL    HDL Cholesterol 45 MG/DL    LDL, calculated 83.2 0 - 100 MG/DL    VLDL, calculated 31.8 MG/DL    CHOL/HDL Ratio 3.6 0.0 - 5.0     METABOLIC PANEL, COMPREHENSIVE   Result Value Ref Range    Sodium 142 136 - 145 mmol/L    Potassium 3.9 3.5 - 5.1 mmol/L    Chloride 104 97 - 108 mmol/L    CO2 31 21 - 32 mmol/L    Anion gap 7 5 - 15 mmol/L    Glucose 106 (H) 65 - 100 mg/dL    BUN 17 6 - 20 MG/DL    Creatinine 0.63 0.55 - 1.02 MG/DL    BUN/Creatinine ratio 27 (H) 12 - 20      GFR est AA >60 >60 ml/min/1.73m2    GFR est non-AA >60 >60 ml/min/1.73m2    Calcium 9.2 8.5 - 10.1 MG/DL    Bilirubin, total 0.6 0.2 - 1.0 MG/DL    ALT (SGPT) 16 12 - 78 U/L    AST (SGOT) 13 (L) 15 - 37 U/L    Alk. phosphatase 118 (H) 45 - 117 U/L    Protein, total 6.8 6.4 - 8.2 g/dL    Albumin 3.6 3.5 - 5.0 g/dL    Globulin 3.2 2.0 - 4.0 g/dL    A-G Ratio 1.1 1.1 - 2.2     CBC W/O DIFF   Result Value Ref Range    WBC 6.9 3.6 - 11.0 K/uL    RBC 4.61 3.80 - 5.20 M/uL    HGB 13.1 11.5 - 16.0 g/dL    HCT 42.6 35.0 - 47.0 %    MCV 92.4 80.0 - 99.0 FL    MCH 28.4 26.0 - 34.0 PG    MCHC 30.8 30.0 - 36.5 g/dL    RDW 15.2 (H) 11.5 - 14.5 %    PLATELET 245 150 - 400 K/uL    MPV 10.9 8.9 - 12.9 FL    NRBC 0.0 0  WBC    ABSOLUTE NRBC 0.00 0.00 - 0.01 K/uL            ASSESSMENT & PLAN       ICD-10-CM ICD-9-CM    1. Irritable bowel syndrome with diarrhea  K58.0 564.1 psyllium (METAMUCIL) powd   2. Gastroesophageal reflux disease, unspecified whether esophagitis present  K21.9 530.81 famotidine (PEPCID) 40 mg tablet   3. Stress  F43.9 V62.89      Metamucil Powder once daily  Continue new probiotic daily for now  Pepcid 40 mg qhs  Dietary trigger avoidance  Reflux precautions and home care instructions  Reassess ~ 6 weeks, sooner prn  Patient counseled about diagnosis, assessment, management options, current recommended treatment plan, expectant course, worsening signs & symptoms w/ instructions for appropriate follow up. Follow up if not improving. Call back sooner for worsening symptoms or failure to improve w/in expectant course. ER evaluation for any severe/new/concerning symptoms as discussed in detail in office today. Patient expresses understanding and agreement with plan of care.  If not improving, follow up w/ GI  ---------------------------------------------------------------------------------------------------------------  Patient is being evaluated by a virtual/ video visit encounter to address concerns as noted above. Patient identification was verified at the start of the visit: YES  A caregiver was present when appropriate. Due to this being a TeleHealth encounter (During [facility]-28 public health emergency), evaluation of the following organ systems was limited: Vitals/Constitutional/EENT/Resp/CV/GI//MS/Neuro/Skin/Heme-Lymph-Imm. Pursuant to the emergency declaration under the [address] [place] [address] authority and the [employer] Act, this Virtual  Visit was conducted, with patient's (and/or legal guardian's) consent, to reduce the patient's risk of exposure to COVID-19 and provide necessary medical care. Services were provided through a video synchronous discussion virtually to substitute for in-person clinic visit. The patient (and/or legal guardian) has also been advised to contact this office for worsening conditions or problems, and seek emergency medical treatment and/or call 911 if deemed necessary. Patient was located at their own home. I was in the office while conducting this encounter. Consent:  She and/or her healthcare decision maker is aware that this patient-initiated Telehealth encounter is a billable service, with coverage as determined by her insurance carrier. She is aware that she may receive a bill and has provided verbal consent to proceed: Yes  This virtual visit was conducted via [employer] me.   Pursuant to the emergency declaration under the [address] [place] [address], [address] [place] [address] authority and the [employer] Act, this Virtual  Visit was conducted to reduce the patient's risk of exposure to COVID-19 and provide continuity of care for an established patient. Services were provided through a video synchronous discussion virtually to substitute for in-person clinic visit. Due to this being a TeleHealth evaluation, many elements of the physical examination are unable to be assessed. Total Time: minutes: 21-30 minutes. An electronic signature was used to authenticate this note.   ~Brittany Márquez MD~

## 2021-04-16 ENCOUNTER — PATIENT MESSAGE (OUTPATIENT)
Dept: FAMILY MEDICINE CLINIC | Age: 71
End: 2021-04-16

## 2021-04-16 NOTE — TELEPHONE ENCOUNTER
From: Héctor Fried  To: Mily Hugo MD  Sent: 4/16/2021 3:24 PM EDT  Subject: Non-Urgent Medical Question    I have an appointment with Dr Bard Avila PA [facility])but they cant see me until May 14. Can you do something to get them to squeeze me in sooner. Im miserable.    Thanks

## 2021-05-03 DIAGNOSIS — N32.81 OVERACTIVE BLADDER: ICD-10-CM

## 2021-05-03 RX ORDER — SOLIFENACIN SUCCINATE 10 MG/1
TABLET, FILM COATED ORAL
Qty: 90 TAB | Refills: 3 | Status: SHIPPED | OUTPATIENT
Start: 2021-05-03 | End: 2022-05-02

## 2021-05-10 LAB — HBA1C MFR BLD HPLC: 6.1 %

## 2021-05-11 LAB — HBA1C MFR BLD HPLC: 6.1 %

## 2021-05-17 ENCOUNTER — PATIENT MESSAGE (OUTPATIENT)
Dept: FAMILY MEDICINE CLINIC | Age: 71
End: 2021-05-17

## 2021-05-17 ENCOUNTER — OFFICE VISIT (OUTPATIENT)
Dept: FAMILY MEDICINE CLINIC | Age: 71
End: 2021-05-17
Payer: MEDICARE

## 2021-05-17 VITALS
OXYGEN SATURATION: 98 % | SYSTOLIC BLOOD PRESSURE: 130 MMHG | BODY MASS INDEX: 36.46 KG/M2 | RESPIRATION RATE: 18 BRPM | TEMPERATURE: 98.4 F | WEIGHT: 218.8 LBS | HEIGHT: 65 IN | HEART RATE: 78 BPM | DIASTOLIC BLOOD PRESSURE: 74 MMHG

## 2021-05-17 DIAGNOSIS — A09 ACUTE INFECTIVE GASTROENTERITIS: ICD-10-CM

## 2021-05-17 DIAGNOSIS — Z09 HOSPITAL DISCHARGE FOLLOW-UP: Primary | ICD-10-CM

## 2021-05-17 DIAGNOSIS — Z90.49 HISTORY OF CHOLECYSTECTOMY: ICD-10-CM

## 2021-05-17 DIAGNOSIS — K86.89 PANCREATIC INSUFFICIENCY: ICD-10-CM

## 2021-05-17 DIAGNOSIS — K52.9 CHRONIC DIARRHEA: ICD-10-CM

## 2021-05-17 PROBLEM — E66.01 SEVERE OBESITY (HCC): Status: RESOLVED | Noted: 2020-12-02 | Resolved: 2021-05-17

## 2021-05-17 PROCEDURE — 99215 OFFICE O/P EST HI 40 MIN: CPT | Performed by: FAMILY MEDICINE

## 2021-05-17 PROCEDURE — 1090F PRES/ABSN URINE INCON ASSESS: CPT | Performed by: FAMILY MEDICINE

## 2021-05-17 PROCEDURE — G8752 SYS BP LESS 140: HCPCS | Performed by: FAMILY MEDICINE

## 2021-05-17 PROCEDURE — 1101F PT FALLS ASSESS-DOCD LE1/YR: CPT | Performed by: FAMILY MEDICINE

## 2021-05-17 PROCEDURE — G8432 DEP SCR NOT DOC, RNG: HCPCS | Performed by: FAMILY MEDICINE

## 2021-05-17 PROCEDURE — 3017F COLORECTAL CA SCREEN DOC REV: CPT | Performed by: FAMILY MEDICINE

## 2021-05-17 PROCEDURE — G9899 SCRN MAM PERF RSLTS DOC: HCPCS | Performed by: FAMILY MEDICINE

## 2021-05-17 PROCEDURE — G8427 DOCREV CUR MEDS BY ELIG CLIN: HCPCS | Performed by: FAMILY MEDICINE

## 2021-05-17 PROCEDURE — G8417 CALC BMI ABV UP PARAM F/U: HCPCS | Performed by: FAMILY MEDICINE

## 2021-05-17 PROCEDURE — G0463 HOSPITAL OUTPT CLINIC VISIT: HCPCS | Performed by: FAMILY MEDICINE

## 2021-05-17 PROCEDURE — G8536 NO DOC ELDER MAL SCRN: HCPCS | Performed by: FAMILY MEDICINE

## 2021-05-17 PROCEDURE — G8399 PT W/DXA RESULTS DOCUMENT: HCPCS | Performed by: FAMILY MEDICINE

## 2021-05-17 PROCEDURE — G8754 DIAS BP LESS 90: HCPCS | Performed by: FAMILY MEDICINE

## 2021-05-17 RX ORDER — METRONIDAZOLE 500 MG/1
500 TABLET ORAL 3 TIMES DAILY
COMMUNITY
Start: 2021-05-11 | End: 2021-05-18

## 2021-05-17 RX ORDER — PANCRELIPASE 36000; 180000; 114000 [USP'U]/1; [USP'U]/1; [USP'U]/1
2 CAPSULE, DELAYED RELEASE PELLETS ORAL
COMMUNITY
Start: 2021-05-03 | End: 2021-12-08 | Stop reason: ALTCHOICE

## 2021-05-17 RX ORDER — MONTELUKAST SODIUM 4 MG/1
1 TABLET, CHEWABLE ORAL 2 TIMES DAILY
COMMUNITY
End: 2021-07-12 | Stop reason: SDUPTHER

## 2021-05-17 RX ORDER — CIPROFLOXACIN 500 MG/1
500 TABLET ORAL 2 TIMES DAILY
COMMUNITY
Start: 2021-05-11 | End: 2021-05-18

## 2021-05-17 NOTE — TELEPHONE ENCOUNTER
From: Maurizio Gutierrez  To: Keven Phillips MD  Sent: 5/17/2021 1:25 PM EDT  Subject: Visit Follow-Up Question    Looks like there might be a form for referral. Do you want me to call for it or you? Thanks for your reassurances and help with this. I never expected to be this sick or this vulnerable but I know Im in good hands.    Jojo

## 2021-05-17 NOTE — PROGRESS NOTES
Chief Complaint   Patient presents with   [facility] [address] [facility] 5-11-21    GI Problem    Referral Request       HISTORY OF PRESENT ILLNESS   HPI  Patient presents for follow up from [facility] 5-11-21 for acute abdominal pain, N/V/D. She initially had a virtual visit w/ me back in Feb for one month h/o recurrent loose bowels, indigestion and acid reflux. She was prescribed Metamucil, Probiotics, and Pepcid. She was doing much better w/ good symptom control up until the diarrhea started up again around 4/11. She was taking Imodium prn. She called to get a follow up w/ GI Dr. María Elena Kimble but was unable to get an appt w/ him until 5/10 back at that time. However in the interim her symptoms progressively worsened. The diarrhea was recurring, she lost about 15 lbs in 2 weeks time, and then on 4/27 she suddenly developed an episode of severe nausea and several episodes of projectile vomiting up to 6 times that day. She was also having diarrhea that day every hour. Her family urged her to go to the ER but she was too miserable and didn't think she could make it there. The next day she called and got worked in for appt at GI office and ended up seeing the NP Naya Clark on 4/28. A BMP was ordered and patient was started on Creon, Colestid, and prn Zofran. She began to feel a bit better up until 5/11 when she developed generalized lower abdominal pain, severe nausea and worsening diarrhea again. This was not accompanied by vomiting this time and this was the first time she recalled having abdominal pain associated w/ her diarrhea before. The stools have always been non bloody. No recent travel. No known food bourne exposures. No known ill contacts. She does take Doxy daily per her Dermatologist for rosacea. She ended up going to [facility] on 5/11. I do not have records from that visit but patient brought in a copy of her lab results and discharge papers. CBC and CMP were normal. Lipase was 50 (). Liver enzymes and lytes were normal. UA was positive for UTI. Culture was sent (no results available at the time of her appt today). Stool cultures were ordered and those results also are not available. EKG and CT Abd/Pelvis were ordered, no results available but patient states she was told she had \"an infection in her gut\". She was prescribed Cipro 500 mg bid x 7 days, Flagyl 500 mg tid x 7 days and Diflucan 150 mg po x 1 (in case of yeast infection from abx). She states that within <24 hrs of starting on the abx she began to feel dramatically better. Since then she has had no recurrence in the abdominal pain and the diarrhea has completely resolved. She is back to having daily normal BM's. She has done some research on her own and is concerned she may have pancreatic insufficiency. She found a GI at [facility] who specializes in pancreatic disorders and would like a referral for consultation. They are requesting medical records first.        REVIEW OF SYMPTOMS   Review of Systems   Constitutional: Negative for chills and fever. Respiratory: Negative. Cardiovascular: Negative. Gastrointestinal: Negative for abdominal pain, blood in stool and melena. Genitourinary: Negative. Neurological: Negative. Endo/Heme/Allergies: Negative.             PROBLEM LIST/MEDICAL HISTORY     Problem List  Date Reviewed: 5/17/2021          Codes Class Noted    Cataracts, bilateral ICD-10-CM: H26.9  ICD-9-CM: 366.9  2/3/2017    Overview Addendum 8/28/2017  8:32 AM by Tamara Manjarrez MD     R>L, Dr Mina Boas, changing to Dr Mercedes Cornell             TIA (transient ischemic attack) ICD-10-CM: G45.9  ICD-9-CM: 435.9  11/18/2016    Overview Signed 11/18/2016 11:02 AM by Tamara Manjarrez MD     [address] 11/2/16-11/3/16, Neuro Dr Phillip Tripathi             Diabetes mellitus type 2, noninsulin dependent ([facility] ICD-10-CM: E11.9  ICD-9-CM: 250.00  11/18/2016    Overview Signed 11/18/2016  1:01 PM by Tamara Manjarrez MD 10/2014             ACP (advance care planning) ICD-10-CM: Z71.89  ICD-9-CM: V65.49  2016    Overview Signed 2016  8:36 AM by Tamara Manjarrez MD     Patient has an AMD             Benign essential hypertension ICD-10-CM: I10  ICD-9-CM: 401.1  2016        Bee sting allergy with anaphylaxis reaction ICD-10-CM: Z91.030  ICD-9-CM: V15.06  2015        Arthritis of right foot & ankle ICD-10-CM: M19.071  ICD-9-CM: 716.97  2015    Overview Signed 2015 10:38 AM by Tamara Manjarrez MD     Ortho Dr Cheri Hathaway; used a boot x 2 months, off              Right knee pain ICD-10-CM: M25.561  ICD-9-CM: 719.46  3/24/2014    Overview Signed 3/24/2014 11:45 AM by Tamara Manjarrez MD     2014; CSI per Ortho Dr London Rodriguez office             Hypercholesterolemia ICD-10-CM: E78.00  ICD-9-CM: 272.0  1/3/2014        Vertebrobasilar artery syndrome ICD-10-CM: G45.0  ICD-9-CM: 435.3  2013    Overview Addendum 2013 12:53 PM by Tamara Manjarrez MD     ER; Neuro Dr Kiarra Negrete; dc'd on ASA, Zocor and Metoprolol             Impaired fasting glucose ICD-10-CM: R73.01  ICD-9-CM: 790.21  4/3/2012    Overview Signed 4/3/2012  3:09 PM by Tamara Manjarrez MD     3/2012             Overactive bladder ICD-10-CM: J90.90  ICD-9-CM: 596.51  3/28/2011        S/P colonoscopy with polypectomy ICD-10-CM: B67.092  ICD-9-CM: V45.89  Unknown    Overview Signed 3/29/2010 12:47 PM by Tamara Manjarrez MD     benign polyp, f/u 5yrs; Dr. Patrick Palacios              (normal spontaneous vaginal delivery) ICD-10-CM: O80  ICD-9-CM: 0  Unknown    Overview Signed 3/29/2010 12:48 PM by Tamara Manjarrez MD     A2, NSVDx3, SAB x2             S/P abdominal hysterectomy and left salpingo-oophorectomy ICD-10-CM: Z90.710, Z90.79, Z90.721  ICD-9-CM: V88.01  Unknown    Overview Signed 3/29/2010 12:49 PM by Tamara Manjarrez MD     Uterine Fibroids; no HRT             S/P SCC excised from left hand ICD-10-CM: C44.621  ICD-9-CM: 173.62  Unknown    Overview Signed 3/29/2010 12:54 PM by Gracie Borden MD     Lt hand, SCC excised, Dr. Gurinder Baxter             Postmenopausal, LMP 38 yo, No HRT history ICD-10-CM: Z78.0  ICD-9-CM: V49.81  3/18/2010        Osteopenia ICD-10-CM: M85.80  ICD-9-CM: 733.90  10/3/2007                  PAST SURGICAL HISTORY     Past Surgical History:   Procedure Laterality Date    COLONOSCOPY  1/2006    q5yrs; Dr Judge Adhikari; Diverticulosis, internal hemorrhoids, benign polyp    ECHO 2D ADULT  1997    Normal    ELECTROCARDIOGRAM REPORT  2010, 2009    Normal    HM DEXA SCAN  10/2007    Osteopenia; spine -1.5, fem neck -1.1    HX CATARACT REMOVAL Bilateral 2/15/2018 and 2/22/2018    [facility]    HX COLONOSCOPY  09/19/2013    Diverticulosis only, f/u 10 yrs, Dr Corina Chou Right 2017    2 cyss removed from right middle finger    HX HYSTERECTOMY  1980    [facility]; NO HRT    HX LAP CHOLECYSTECTOMY  9/92    HX ORTHOPAEDIC      benign tumor removed from left wrist    HX ORTHOPAEDIC Right 1/13/16, Dr Krishan Cota    right foot pin/plate    HX OTHER SURGICAL      Dermatology :  Removal a melamona left side of face     HX TONSILLECTOMY  1950    T&A    MD COLONOSCOPY FLX DX W/COLLJ SPEC WHEN PFRMD  5/2008 , repeat 5 yrs    for rectal bleeding; hemorrhoids, diverticulae, small polyp         MEDICATIONS     Current Outpatient Medications   Medication Sig    Creon 36,000-114,000- 180,000 unit cpDR capsule 2 Caps three (3) times daily (with meals).  colestipoL (COLESTID) 1 gram tablet Take 1 g by mouth two (2) times a day.  ciprofloxacin HCl (Cipro) 500 mg tablet Take 500 mg by mouth two (2) times a day. Started at [facility] 5/11/21 x 7 days    metroNIDAZOLE (FlagyL) 500 mg tablet Take 500 mg by mouth three (3) times daily.  Started at [facility] 5/11/21 x 7 days    solifenacin (VESICARE) 10 mg tablet TAKE ONE TABLET BY MOUTH ONE TIME DAILY    famotidine (PEPCID) 40 mg tablet Take 1 Tab by mouth nightly.  clopidogreL (PLAVIX) 75 mg tab TAKE ONE TABLET BY MOUTH ONE TIME DAILY    metoprolol tartrate (LOPRESSOR) 25 mg tablet TAKE ONE TABLET BY MOUTH EVERY 12 HOURS    simvastatin (ZOCOR) 10 mg tablet TAKE ONE TABLET BY MOUTH EVERY EVENING    vit A/vit C/vit E/zinc/copper (OCUVITE PRESERVISION PO) Take  by mouth daily.  doxycycline (PERIOSTAT) 20 mg tablet Take 40 mg by mouth daily. Per Derm for Rosacea    losartan-hydroCHLOROthiazide (HYZAAR) 100-25 mg per tablet Take 1 Tab by mouth daily. Changed from plain Losartan 100 mg daily per cardio 12-25-18    dilTIAZem CD (CARDIZEM CD) 180 mg ER capsule Take 1 Cap by mouth daily. Started per cardio 12-25-18 for SVT    calcium-cholecalciferol, D3, (CALTRATE 600+D) tablet Take 1 Tab by mouth daily.  acetaminophen (TYLENOL) 650 mg CR tablet Take 1,300 mg by mouth daily as needed for Pain.  EPIPEN 2-MONICA 0.3 mg/0.3 mL (1:1,000) injection USE IMMEDIATELY AS NEEDED FOR ALLERGIC REACTION TO BEE STING    L. acidoph & paracasei- S therm- Bifido (JULIETA-Q) 8 billion cell cap cap Take 1 Cap by mouth daily.  vitamin e 400 unit Tab Take  by mouth daily.  multivitamins-minerals-lutein (CENTRUM SILVER) Tab Take 1 Tab by mouth daily. No current facility-administered medications for this visit. ALLERGIES     Allergies   Allergen Reactions    Oxycodone Hives, Nausea Only and Other (comments)    Ace Inhibitors Cough    Bee Sting [Sting, Bee] Swelling    Fish Oil Other (comments)     GI upset    Lisinopril Cough    Motrin [Ibuprofen] Swelling and Myalgia    Oxycodone-Acetaminophen Nausea and Vomiting    Pcn [Penicillins] Swelling    Promethazine-Dm Other (comments)     HA, nightmares          SOCIAL HISTORY     Social History     Tobacco Use    Smoking status: Never Smoker    Smokeless tobacco: Never Used   Substance Use Topics    Alcohol use:  Yes     Alcohol/week: 1.7 standard drinks     Types: 2 Glasses of wine per week     Comment: 2 glasses of wine per week     Social History     Social History Narrative    , 3 children    Retired  provider    Patient has an AMD     Social History     Substance and Sexual Activity   Sexual Activity Yes    Partners: Male       IMMUNIZATIONS     Immunization History   Administered Date(s) Administered    (RETIRED) Pneumococcal Vaccine (Unspecified Type) 2009    Covid-19, MODERNA, Mrna, Lnp-s, Pf, 100mcg/0.5mL 02/15/2021    Influenza High Dose Vaccine PF 2016, 2017, 10/30/2019    Influenza Vaccine 2015    Influenza Vaccine (Tri) Adjuvanted (>65 Yrs FLUAD TRI [id number]) 2018, 10/30/2019    Influenza Vaccine Split 10/01/2012    Influenza, Quadrivalent, Adjuvanted (>65 Yrs FLUAD QUAD [id number]) 2020    Pneumococcal Conjugate (PCV-13) 2018    Pneumococcal Polysaccharide (PPSV-23) 2014, 02/10/2020    TD Vaccine 1999    TDAP Vaccine 2009    Td, Adsorbed PF 2015    Varicella Virus Vaccine Live 10/01/2011    Zoster 2011    Zoster Recombinant 05/10/2018, 2018         FAMILY HISTORY     Family History   Problem Relation Age of Onset    Thyroid Disease Mother         h/o Grave's dz;  age 81yo   [facility] Osteoporosis Mother     Delayed Awakening Mother    [facility] Stroke Father          age 80    Other Father         AAA age 76, macular degeneration    Alcohol abuse Father         smoker and drinker    Osteoporosis Sister     Other Son         gout    Gout Son     Dementia Maternal Grandmother         dementia and mental illness    Other Brother         heart murmur    Liver Disease Brother         hepatitis c    Anesth Problems Neg Hx          VITALS     Visit Vitals  /74 (BP 1 Location: Right arm, BP Patient Position: Sitting, BP Cuff Size: Adult)   Pulse 78   Temp 98.4 °F (36.9 °C) (Temporal)   Resp 18   Ht 5' 5\" (1.651 m)   Wt 218 lb 12.8 oz (99.2 kg)   SpO2 98%   BMI 36.41 kg/m² PHYSICAL EXAMINATION   Physical Exam  Vitals signs reviewed. Constitutional:       General: She is not in acute distress. Appearance: She is not ill-appearing. Cardiovascular:      Rate and Rhythm: Normal rate and regular rhythm. Heart sounds: Normal heart sounds. Pulmonary:      Effort: Pulmonary effort is normal.      Breath sounds: Normal breath sounds. Abdominal:      General: Bowel sounds are normal. There is no distension. Palpations: Abdomen is soft. There is no mass. Tenderness: There is no abdominal tenderness. There is no guarding. Skin:     General: Skin is warm and dry. Neurological:      Mental Status: She is alert.                 LABORATORY DATA/ANCILLARY/IMAGING     Results for orders placed or performed in visit on 12/17/20   MICROALBUMIN, UR, RAND W/ MICROALB/CREAT RATIO   Result Value Ref Range    Microalbumin,urine random 1.01 MG/DL    Creatinine, urine 87.40 mg/dL    Microalbumin/Creat ratio (mg/g creat) 12 0 - 30 mg/g   HEMOGLOBIN A1C WITH EAG   Result Value Ref Range    Hemoglobin A1c 5.9 (H) 4.0 - 5.6 %    Est. average glucose 123 mg/dL   TSH 3RD GENERATION   Result Value Ref Range    TSH 1.76 0.36 - 3.74 uIU/mL   LIPID PANEL   Result Value Ref Range    LIPID PROFILE          Cholesterol, total 160 <200 MG/DL    Triglyceride 159 (H) <150 MG/DL    HDL Cholesterol 45 MG/DL    LDL, calculated 83.2 0 - 100 MG/DL    VLDL, calculated 31.8 MG/DL    CHOL/HDL Ratio 3.6 0.0 - 5.0     METABOLIC PANEL, COMPREHENSIVE   Result Value Ref Range    Sodium 142 136 - 145 mmol/L    Potassium 3.9 3.5 - 5.1 mmol/L    Chloride 104 97 - 108 mmol/L    CO2 31 21 - 32 mmol/L    Anion gap 7 5 - 15 mmol/L    Glucose 106 (H) 65 - 100 mg/dL    BUN 17 6 - 20 MG/DL    Creatinine 0.63 0.55 - 1.02 MG/DL    BUN/Creatinine ratio 27 (H) 12 - 20      GFR est AA >60 >60 ml/min/1.73m2    GFR est non-AA >60 >60 ml/min/1.73m2    Calcium 9.2 8.5 - 10.1 MG/DL    Bilirubin, total 0.6 0.2 - 1.0 MG/DL ALT (SGPT) 16 12 - 78 U/L    AST (SGOT) 13 (L) 15 - 37 U/L    Alk. phosphatase 118 (H) 45 - 117 U/L    Protein, total 6.8 6.4 - 8.2 g/dL    Albumin 3.6 3.5 - 5.0 g/dL    Globulin 3.2 2.0 - 4.0 g/dL    A-G Ratio 1.1 1.1 - 2.2     CBC W/O DIFF   Result Value Ref Range    WBC 6.9 3.6 - 11.0 K/uL    RBC 4.61 3.80 - 5.20 M/uL    HGB 13.1 11.5 - 16.0 g/dL    HCT 42.6 35.0 - 47.0 %    MCV 92.4 80.0 - 99.0 FL    MCH 28.4 26.0 - 34.0 PG    MCHC 30.8 30.0 - 36.5 g/dL    RDW 15.2 (H) 11.5 - 14.5 %    PLATELET 222 243 - 328 K/uL    MPV 10.9 8.9 - 12.9 FL    NRBC 0.0 0  WBC    ABSOLUTE NRBC 0.00 0.00 - 0.01 K/uL       Lab Results   Component Value Date/Time    Hemoglobin A1c 5.9 (H) 12/17/2020 08:54 AM    Hemoglobin A1c 6.4 (H) 12/18/2019 09:30 AM    Hemoglobin A1c 6.3 (H) 06/12/2019 08:43 AM    Glucose 106 (H) 12/17/2020 08:54 AM    Glucose (POC) 116 (H) 11/03/2016 11:52 AM    Microalbumin/Creat ratio (mg/g creat) 12 12/17/2020 08:54 AM    Microalbumin,urine random 1.01 12/17/2020 08:54 AM    LDL, calculated 83.2 12/17/2020 08:54 AM    Creatinine 0.63 12/17/2020 08:54 AM      Lab Results   Component Value Date/Time    TSH 1.76 12/17/2020 08:54 AM          ASSESSMENT & PLAN       ICD-10-CM ICD-9-CM    1. ER Follow Up [facility] 5-11-21 Z09 V67.59    2. Acute infective gastroenteritis  A09 009.0 ciprofloxacin HCl (Cipro) 500 mg tablet      metroNIDAZOLE (FlagyL) 500 mg tablet   3. Chronic diarrhea  K52.9 787.91 colestipoL (COLESTID) 1 gram tablet      REFERRAL TO GASTROENTEROLOGY   4. R/O Pancreatic insufficiency  K86.89 577.8 Creon 36,000-114,000- 180,000 unit cpDR capsule      REFERRAL TO GASTROENTEROLOGY   5. History of cholecystectomy  Z90.49 V45.79 colestipoL (COLESTID) 1 gram tablet     Patient requesting consult with Dr. Jagruti Luis for evaluation to rule out possible pancreatic insufficiency. Phone: [phone]. Fax: [phone].  Patient states their office requires office notes and records in order for a consultation appointment to be scheduled. Faxing today's office note w/ lab results from [facility] that patient brought in w/ her today. Requested the rest of hospital notes from [facility] to include CT Abd/Pelvis as well.                  Time: 45 minutes

## 2021-05-17 NOTE — PROGRESS NOTES
Chief Complaint   Patient presents with   [facility] Follow Up     ER [facility]     1. Have you been to the ER, urgent care clinic since your last visit? Hospitalized since your last visit? Yes, [facility]     2. Have you seen or consulted any other health care providers outside of the [address] [place] [address] since your last visit? Include any pap smears or colon screening.   Yes, Cleve Bowman

## 2021-05-18 NOTE — TELEPHONE ENCOUNTER
From: Chaya Curling  To: Fernando Arellano MD  Sent: 5/17/2021 9:35 PM EDT  Subject: Non-Urgent Medical Question    Did you receive lab results and visit notes from [facility] office?

## 2021-05-20 DIAGNOSIS — K21.9 GASTROESOPHAGEAL REFLUX DISEASE, UNSPECIFIED WHETHER ESOPHAGITIS PRESENT: ICD-10-CM

## 2021-05-22 RX ORDER — FAMOTIDINE 40 MG/1
TABLET, FILM COATED ORAL
Qty: 90 TABLET | Refills: 1 | Status: SHIPPED | OUTPATIENT
Start: 2021-05-22 | End: 2021-11-03

## 2021-05-23 ENCOUNTER — PATIENT MESSAGE (OUTPATIENT)
Dept: FAMILY MEDICINE CLINIC | Age: 71
End: 2021-05-23

## 2021-05-25 NOTE — TELEPHONE ENCOUNTER
From: Tracy Leblanc  To: Martha Nicolas MD  Sent: 5/23/2021 8:40 AM EDT  Subject: Non-Urgent Medical Question    Did notes from [facility] arrive? Good news. Venus Deondre been accepted by [facility]. They want to start at the beginning and then co ordinate/ transfer to [address] if necessary. Bad news is they cant see me until November but I will check in regularly for a cancellation. Meanwhile I bump along.

## 2021-07-12 DIAGNOSIS — K52.9 CHRONIC DIARRHEA: ICD-10-CM

## 2021-07-12 DIAGNOSIS — Z90.49 HISTORY OF CHOLECYSTECTOMY: ICD-10-CM

## 2021-07-12 RX ORDER — MONTELUKAST SODIUM 4 MG/1
1 TABLET, CHEWABLE ORAL 2 TIMES DAILY
Qty: 180 TABLET | Refills: 0 | Status: SHIPPED | OUTPATIENT
Start: 2021-07-12

## 2021-07-12 NOTE — TELEPHONE ENCOUNTER
Historical medication: Colestid 1 gram     Last visit 05/17/2021 MD Angella Morrell   Next appointment Nothing scheduled       Requested Prescriptions     Pending Prescriptions Disp Refills    colestipoL (COLESTID) 1 gram tablet 180 Tablet 0     Sig: Take 1 Tablet by mouth two (2) times a day.

## 2021-07-16 DIAGNOSIS — K52.9 CHRONIC DIARRHEA: ICD-10-CM

## 2021-07-16 DIAGNOSIS — Z90.49 HISTORY OF CHOLECYSTECTOMY: ICD-10-CM

## 2021-07-16 RX ORDER — MONTELUKAST SODIUM 4 MG/1
TABLET, CHEWABLE ORAL
Qty: 180 TABLET | Refills: 0 | OUTPATIENT
Start: 2021-07-16

## 2021-10-06 ENCOUNTER — TELEPHONE (OUTPATIENT)
Dept: FAMILY MEDICINE CLINIC | Age: 71
End: 2021-10-06

## 2021-10-06 DIAGNOSIS — Z78.0 POSTMENOPAUSAL STATE: ICD-10-CM

## 2021-10-06 DIAGNOSIS — Z13.820 SCREENING FOR OSTEOPOROSIS: Primary | ICD-10-CM

## 2021-10-06 NOTE — TELEPHONE ENCOUNTER
Advise pt that actually I did a bone density test on her in 2016 which was normal.   Per Medicare she would be eligible to get another one. So I have placed that order and when they call she can get that scheduled at her convenience. You can book her for her Medicare AWV due in  and we can discuss her bone density results at that time.

## 2021-10-06 NOTE — TELEPHONE ENCOUNTER
PI of DR Anusha tyler. Gave her the # for CAIN and scheduled her for AWV 12/8. Pt reports that she has been seeing GI @ [facility] and is doing much better. I will fax request for records.   Pt has been seeing Dr Soledad Carrasco and Dr Juan Newsome

## 2021-10-06 NOTE — TELEPHONE ENCOUNTER
----- Message from Gray Juarez LPN sent at 35/9/5973  7:30 AM EDT -----  Regarding: FW: Update Medical Information  Contact: [phone]  When do you want to see her again? Do you want to address the bone density at that visit? She is due AWV in Dec.  I will need to get her scheduled soon.   ----- Message -----  From: Barbara Shane  Sent: 10/4/2021   4:48 PM EDT  To: [facility] Nurse Pool  Subject: RE: Update Medical Information                   Probably 20 years ago. I think Dr Jessica Gudino ordered it because of family history. Anyhow, I dont have a gyn anymore- she retired. Said because Leander had a hysterectomy, never had any signs of trouble with Pap smears and not sexually active there was no need. One less doctor appointment!

## 2021-10-06 NOTE — PATIENT INSTRUCTIONS
Medicare Wellness Visit, Female     The best way to live healthy is to have a lifestyle where you eat a well-balanced diet, exercise regularly, limit alcohol use, and quit all forms of tobacco/nicotine, if applicable. Regular preventive services are another way to keep healthy. Preventive services (vaccines, screening tests, monitoring & exams) can help personalize your care plan, which helps you manage your own care. Screening tests can find health problems at the earliest stages, when they are easiest to treat. [facility] follows the current, evidence-based guidelines published by the [facility] Kee Mata ([facility]) when recommending preventive services for our patients. Because we follow these guidelines, sometimes recommendations change over time as research supports it. (For example, mammograms used to be recommended annually. Even though Medicare will still pay for an annual mammogram, the newer guidelines recommend a mammogram every two years for women of average risk). Of course, you and your doctor may decide to screen more often for some diseases, based on your risk and your co-morbidities (chronic disease you are already diagnosed with). Preventive services for you include:  - Medicare offers their members a free annual wellness visit, which is time for you and your primary care provider to discuss and plan for your preventive service needs. Take advantage of this benefit every year!  -All adults over the age of 72 should receive the recommended pneumonia vaccines. Current USPSTF guidelines recommend a series of two vaccines for the best pneumonia protection.   -All adults should have a flu vaccine yearly and a tetanus vaccine every 10 years.   -All adults age 48 and older should receive the shingles vaccines (series of two vaccines).       -All adults age 38-68 who are overweight should have a diabetes screening test once every three years.   -All adults born between [birth year] and 1967 should be screened once for Hepatitis C.  -Other screening tests and preventive services for persons with diabetes include: an eye exam to screen for diabetic retinopathy, a kidney function test, a foot exam, and stricter control over your cholesterol.   -Cardiovascular screening for adults with routine risk involves an electrocardiogram (ECG) at intervals determined by your doctor.   -Colorectal cancer screenings should be done for adults age 54-65 with no increased risk factors for colorectal cancer. There are a number of acceptable methods of screening for this type of cancer. Each test has its own benefits and drawbacks. Discuss with your doctor what is most appropriate for you during your annual wellness visit. The different tests include: colonoscopy (considered the best screening method), a fecal occult blood test, a fecal DNA test, and sigmoidoscopy.    -A bone mass density test is recommended when a woman turns 65 to screen for osteoporosis. This test is only recommended one time, as a screening. Some providers will use this same test as a disease monitoring tool if you already have osteoporosis. -Breast cancer screenings are recommended every other year for women of normal risk, age 54-69.  -Cervical cancer screenings for women over age 72 are only recommended with certain risk factors. Here is a list of your current Health Maintenance items (your personalized list of preventive services) with a due date: There are no preventive care reminders to display for this patient.

## 2021-10-13 ENCOUNTER — TELEPHONE (OUTPATIENT)
Dept: FAMILY MEDICINE CLINIC | Age: 71
End: 2021-10-13

## 2021-10-13 ENCOUNTER — HOSPITAL ENCOUNTER (OUTPATIENT)
Dept: MAMMOGRAPHY | Age: 71
Discharge: HOME | End: 2021-10-13
Attending: FAMILY MEDICINE
Payer: MEDICARE

## 2021-10-13 DIAGNOSIS — Z78.0 POSTMENOPAUSAL STATE: ICD-10-CM

## 2021-10-13 DIAGNOSIS — B37.31 VAGINAL YEAST INFECTION: Primary | ICD-10-CM

## 2021-10-13 DIAGNOSIS — Z13.820 SCREENING FOR OSTEOPOROSIS: ICD-10-CM

## 2021-10-13 PROCEDURE — 77080 DXA BONE DENSITY AXIAL: CPT

## 2021-10-14 RX ORDER — FLUCONAZOLE 150 MG/1
150 TABLET ORAL DAILY
Qty: 2 TABLET | Refills: 0 | Status: SHIPPED | OUTPATIENT
Start: 2021-10-14 | End: 2021-10-15

## 2021-10-15 ENCOUNTER — PATIENT MESSAGE (OUTPATIENT)
Dept: FAMILY MEDICINE CLINIC | Age: 71
End: 2021-10-15

## 2021-10-21 ENCOUNTER — TELEPHONE (OUTPATIENT)
Dept: FAMILY MEDICINE CLINIC | Age: 71
End: 2021-10-21

## 2021-10-21 RX ORDER — FLUCONAZOLE 150 MG/1
150 TABLET ORAL DAILY
Qty: 1 TABLET | Refills: 0 | Status: SHIPPED | OUTPATIENT
Start: 2021-10-21 | End: 2021-10-22

## 2021-10-21 RX ORDER — NITROFURANTOIN 25; 75 MG/1; MG/1
100 CAPSULE ORAL 2 TIMES DAILY
Qty: 6 CAPSULE | Refills: 0 | Status: SHIPPED | OUTPATIENT
Start: 2021-10-21 | End: 2021-10-24

## 2021-10-21 RX ORDER — CIPROFLOXACIN 500 MG/1
500 TABLET ORAL 2 TIMES DAILY
Qty: 6 TABLET | Refills: 0 | Status: CANCELLED | OUTPATIENT
Start: 2021-10-21 | End: 2021-10-24

## 2021-10-21 NOTE — TELEPHONE ENCOUNTER
Patient called state she has a UTI no appointment.  Would like for the nurse to give her a call wants to know if provider could call something in to the pharmacy    Requesting a call back    Best call back #[phone]

## 2021-10-21 NOTE — TELEPHONE ENCOUNTER
Called pt and she states that she has a UTI. \"she gets them a lot\". She is having lower back pain and urgency. She has to go to urinate every 45 min to an hour and it has been going on all night. She needs cipro and diflucan sent to [facility]. She always gets a yeast infection with taking ABX.

## 2021-11-03 DIAGNOSIS — K21.9 GASTROESOPHAGEAL REFLUX DISEASE, UNSPECIFIED WHETHER ESOPHAGITIS PRESENT: ICD-10-CM

## 2021-11-03 RX ORDER — FAMOTIDINE 40 MG/1
TABLET, FILM COATED ORAL
Qty: 90 TABLET | Refills: 1 | Status: SHIPPED | OUTPATIENT
Start: 2021-11-03 | End: 2022-07-13

## 2021-12-08 ENCOUNTER — OFFICE VISIT (OUTPATIENT)
Dept: FAMILY MEDICINE CLINIC | Age: 71
End: 2021-12-08
Payer: MEDICARE

## 2021-12-08 VITALS
WEIGHT: 212.6 LBS | BODY MASS INDEX: 35.42 KG/M2 | OXYGEN SATURATION: 96 % | SYSTOLIC BLOOD PRESSURE: 136 MMHG | TEMPERATURE: 98.1 F | DIASTOLIC BLOOD PRESSURE: 72 MMHG | HEART RATE: 71 BPM | RESPIRATION RATE: 16 BRPM | HEIGHT: 65 IN

## 2021-12-08 DIAGNOSIS — E11.9 DIABETES MELLITUS TYPE 2, NONINSULIN DEPENDENT (HCC): ICD-10-CM

## 2021-12-08 DIAGNOSIS — I10 BENIGN ESSENTIAL HYPERTENSION: ICD-10-CM

## 2021-12-08 DIAGNOSIS — Z00.00 MEDICARE ANNUAL WELLNESS VISIT, SUBSEQUENT: Primary | ICD-10-CM

## 2021-12-08 DIAGNOSIS — E78.00 HYPERCHOLESTEROLEMIA: ICD-10-CM

## 2021-12-08 LAB
ALBUMIN SERPL-MCNC: 3.6 G/DL (ref 3.5–5)
ALBUMIN/GLOB SERPL: 1.1 {RATIO} (ref 1.1–2.2)
ALP SERPL-CCNC: 110 U/L (ref 45–117)
ALT SERPL-CCNC: 18 U/L (ref 12–78)
ANION GAP SERPL CALC-SCNC: 5 MMOL/L (ref 5–15)
AST SERPL-CCNC: 15 U/L (ref 15–37)
BILIRUB SERPL-MCNC: 0.9 MG/DL (ref 0.2–1)
BUN SERPL-MCNC: 20 MG/DL (ref 6–20)
BUN/CREAT SERPL: 31 (ref 12–20)
CALCIUM SERPL-MCNC: 9.7 MG/DL (ref 8.5–10.1)
CHLORIDE SERPL-SCNC: 106 MMOL/L (ref 97–108)
CHOLEST SERPL-MCNC: 97 MG/DL
CO2 SERPL-SCNC: 29 MMOL/L (ref 21–32)
CREAT SERPL-MCNC: 0.64 MG/DL (ref 0.55–1.02)
CREAT UR-MCNC: 78.3 MG/DL
ERYTHROCYTE [DISTWIDTH] IN BLOOD BY AUTOMATED COUNT: 14.2 % (ref 11.5–14.5)
EST. AVERAGE GLUCOSE BLD GHB EST-MCNC: 123 MG/DL
GLOBULIN SER CALC-MCNC: 3.2 G/DL (ref 2–4)
GLUCOSE SERPL-MCNC: 93 MG/DL (ref 65–100)
HBA1C MFR BLD: 5.9 % (ref 4–5.6)
HCT VFR BLD AUTO: 41.9 % (ref 35–47)
HDLC SERPL-MCNC: 43 MG/DL
HDLC SERPL: 2.3 {RATIO} (ref 0–5)
HGB BLD-MCNC: 13 G/DL (ref 11.5–16)
LDLC SERPL CALC-MCNC: 34.6 MG/DL (ref 0–100)
MCH RBC QN AUTO: 29.1 PG (ref 26–34)
MCHC RBC AUTO-ENTMCNC: 31 G/DL (ref 30–36.5)
MCV RBC AUTO: 93.9 FL (ref 80–99)
MICROALBUMIN UR-MCNC: 0.65 MG/DL
MICROALBUMIN/CREAT UR-RTO: 8 MG/G (ref 0–30)
NRBC # BLD: 0 K/UL (ref 0–0.01)
NRBC BLD-RTO: 0 PER 100 WBC
PLATELET # BLD AUTO: 240 K/UL (ref 150–400)
PMV BLD AUTO: 11 FL (ref 8.9–12.9)
POTASSIUM SERPL-SCNC: 4.1 MMOL/L (ref 3.5–5.1)
PROT SERPL-MCNC: 6.8 G/DL (ref 6.4–8.2)
RBC # BLD AUTO: 4.46 M/UL (ref 3.8–5.2)
SODIUM SERPL-SCNC: 140 MMOL/L (ref 136–145)
TRIGL SERPL-MCNC: 97 MG/DL (ref ?–150)
TSH SERPL DL<=0.05 MIU/L-ACNC: 0.77 UIU/ML (ref 0.36–3.74)
VLDLC SERPL CALC-MCNC: 19.4 MG/DL
WBC # BLD AUTO: 8.5 K/UL (ref 3.6–11)

## 2021-12-08 PROCEDURE — G0439 PPPS, SUBSEQ VISIT: HCPCS | Performed by: FAMILY MEDICINE

## 2021-12-08 PROCEDURE — 1090F PRES/ABSN URINE INCON ASSESS: CPT | Performed by: FAMILY MEDICINE

## 2021-12-08 PROCEDURE — G9899 SCRN MAM PERF RSLTS DOC: HCPCS | Performed by: FAMILY MEDICINE

## 2021-12-08 PROCEDURE — 2022F DILAT RTA XM EVC RTNOPTHY: CPT | Performed by: FAMILY MEDICINE

## 2021-12-08 PROCEDURE — G0463 HOSPITAL OUTPT CLINIC VISIT: HCPCS | Performed by: FAMILY MEDICINE

## 2021-12-08 PROCEDURE — 1101F PT FALLS ASSESS-DOCD LE1/YR: CPT | Performed by: FAMILY MEDICINE

## 2021-12-08 PROCEDURE — G8510 SCR DEP NEG, NO PLAN REQD: HCPCS | Performed by: FAMILY MEDICINE

## 2021-12-08 PROCEDURE — G8754 DIAS BP LESS 90: HCPCS | Performed by: FAMILY MEDICINE

## 2021-12-08 PROCEDURE — 3044F HG A1C LEVEL LT 7.0%: CPT | Performed by: FAMILY MEDICINE

## 2021-12-08 PROCEDURE — 99214 OFFICE O/P EST MOD 30 MIN: CPT | Performed by: FAMILY MEDICINE

## 2021-12-08 PROCEDURE — G8752 SYS BP LESS 140: HCPCS | Performed by: FAMILY MEDICINE

## 2021-12-08 PROCEDURE — 3017F COLORECTAL CA SCREEN DOC REV: CPT | Performed by: FAMILY MEDICINE

## 2021-12-08 PROCEDURE — G8427 DOCREV CUR MEDS BY ELIG CLIN: HCPCS | Performed by: FAMILY MEDICINE

## 2021-12-08 PROCEDURE — G8417 CALC BMI ABV UP PARAM F/U: HCPCS | Performed by: FAMILY MEDICINE

## 2021-12-08 PROCEDURE — G8399 PT W/DXA RESULTS DOCUMENT: HCPCS | Performed by: FAMILY MEDICINE

## 2021-12-08 PROCEDURE — G8536 NO DOC ELDER MAL SCRN: HCPCS | Performed by: FAMILY MEDICINE

## 2021-12-08 NOTE — PATIENT INSTRUCTIONS
Medicare Wellness Visit, Female     The best way to live healthy is to have a lifestyle where you eat a well-balanced diet, exercise regularly, limit alcohol use, and quit all forms of tobacco/nicotine, if applicable. Regular preventive services are another way to keep healthy. Preventive services (vaccines, screening tests, monitoring & exams) can help personalize your care plan, which helps you manage your own care. Screening tests can find health problems at the earliest stages, when they are easiest to treat. [facility] follows the current, evidence-based guidelines published by the [facility] ([facility]) when recommending preventive services for our patients. Because we follow these guidelines, sometimes recommendations change over time as research supports it. (For example, mammograms used to be recommended annually. Even though Medicare will still pay for an annual mammogram, the newer guidelines recommend a mammogram every two years for women of average risk). Of course, you and your doctor may decide to screen more often for some diseases, based on your risk and your co-morbidities (chronic disease you are already diagnosed with). Preventive services for you include:  - Medicare offers their members a free annual wellness visit, which is time for you and your primary care provider to discuss and plan for your preventive service needs. Take advantage of this benefit every year!  -All adults over the age of 72 should receive the recommended pneumonia vaccines. Current USPSTF guidelines recommend a series of two vaccines for the best pneumonia protection.   -All adults should have a flu vaccine yearly and a tetanus vaccine every 10 years.   -All adults age 48 and older should receive the shingles vaccines (series of two vaccines).       -All adults age 38-68 who are overweight should have a diabetes screening test once every three years.   -All adults born between [birth year] and 1967 should be screened once for Hepatitis C.  -Other screening tests and preventive services for persons with diabetes include: an eye exam to screen for diabetic retinopathy, a kidney function test, a foot exam, and stricter control over your cholesterol.   -Cardiovascular screening for adults with routine risk involves an electrocardiogram (ECG) at intervals determined by your doctor.   -Colorectal cancer screenings should be done for adults age 54-65 with no increased risk factors for colorectal cancer. There are a number of acceptable methods of screening for this type of cancer. Each test has its own benefits and drawbacks. Discuss with your doctor what is most appropriate for you during your annual wellness visit. The different tests include: colonoscopy (considered the best screening method), a fecal occult blood test, a fecal DNA test, and sigmoidoscopy.    -A bone mass density test is recommended when a woman turns 65 to screen for osteoporosis. This test is only recommended one time, as a screening. Some providers will use this same test as a disease monitoring tool if you already have osteoporosis. -Breast cancer screenings are recommended every other year for women of normal risk, age 54-69.  -Cervical cancer screenings for women over age 72 are only recommended with certain risk factors.      Here is a list of your current Health Maintenance items (your personalized list of preventive services) with a due date:  Health Maintenance Due   Topic Date Due    Diabetic Foot Care  12/02/2021    Albumin Urine Test  12/17/2021    Cholesterol Test   12/17/2021

## 2021-12-08 NOTE — PROGRESS NOTES
This is the Subsequent Medicare Annual Wellness Exam, performed 12 months or more after the Initial AWV or the last Subsequent AWV    I have reviewed the patient's medical history in detail and updated the computerized patient record. Assessment/Plan   Education and counseling provided:  Are appropriate based on today's review and evaluation    1. Medicare annual wellness visit, subsequent       Depression Risk Factor Screening     3 most recent PHQ Screens 12/8/2021   PHQ Not Done -   Little interest or pleasure in doing things Not at all   Feeling down, depressed, irritable, or hopeless Not at all   Total Score PHQ 2 0       Alcohol Risk Screen    Do you average more than 1 drink per night or more than 7 drinks a week:  No    On any one occasion in the past three months have you have had more than 3 drinks containing alcohol:  No        Functional Ability and Level of Safety    Hearing: Hearing is good. Activities of Daily Living: The home contains: no safety equipment. Patient does total self care  ADL Assessment 12/8/2021   Feeding yourself No Help Needed   Getting from bed to chair No Help Needed   Getting dressed No Help Needed   Bathing or showering No Help Needed   Walk across the room (includes cane/walker) No Help Needed   Using the telphone No Help Needed   Taking your medications No Help Needed   Preparing meals No Help Needed   Managing money (expenses/bills) No Help Needed   Moderately strenuous housework (laundry) No Help Needed   Shopping for personal items (toiletries/medicines) No Help Needed   Shopping for groceries No Help Needed   Driving No Help Needed   Climbing a flight of stairs No Help Needed   Getting to places beyond walking distances No Help Needed         Ambulation: with no difficulty     Fall Risk:  Fall Risk Assessment, last 12 mths 12/8/2021   Able to walk? Yes   Fall in past 12 months? 0   Do you feel unsteady?  1   Is the gait abnormal? 0   Number of falls in past 12 months -   Fall with injury?  -      Abuse Screen:  Patient is not abused       Cognitive Screening    Has your family/caregiver stated any concerns about your memory: no         Health Maintenance Due     Health Maintenance Due   Topic Date Due    Foot Exam Q1  2021    MICROALBUMIN Q1  2021    Lipid Screen  2021       Patient Care Team   Patient Care Team:  Rafa Whiting MD as PCP - Major Conklin MD as PCP - [facility]  Neha Barcenas MD (Cardiology)    History     Patient Active Problem List   Diagnosis Code    Postmenopausal, LMP 36 yo, No HRT history Z78.0    Osteopenia M85.80    S/P colonoscopy with polypectomy Z98.890     (normal spontaneous vaginal delivery) O80    S/P abdominal hysterectomy and left salpingo-oophorectomy Z90.710, Z90.79, Z90.721    S/P SCC excised from left hand C44.621    Overactive bladder N32.81    Impaired fasting glucose R73.01    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    Hypercholesterolemia E78.00    Right knee pain M25.561    Arthritis of right foot & ankle M19.071    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.030    ACP (advance care planning) Z71.89    Benign essential hypertension I10    TIA (transient ischemic attack) G45.9    Diabetes mellitus type 2, noninsulin dependent ([address] E11.9    Cataracts, bilateral H26.9     Past Medical History:   Diagnosis Date    ACP (advance care planning) 2016    Patient has an AMD    Adverse effect of anesthesia     difficulty waking and cold postop    Arthritis     Hip    Arthritis of right foot & ankle 2015    Ortho Dr Shiva Godoy; used a boot x 2 months, off -    Bee sting allergy with anaphylaxis reaction 2015    Benign essential hypertension 2016    Candida intertrigo 3/29/2010    Cataracts, bilateral 2/3/2017    R>L, Dr Canelo Vazquez    Diabetes mellitus type 2, noninsulin dependent ([facility] [address] 2016    10/2014    Frequent UTI  Hx of complete [facility] exam 2018    [facility]    Hx of mammogram 2018    [facility] - No findings for malignancy identified-Yearly mammogram recommended     Hypercholesterolemia 1/3/2014     (normal spontaneous vaginal delivery)     A2, NSVDx3, SAB x2    Osteopenia 10/3/2007    Overactive bladder 3/28/2011    Pneumonia     Postmenopausal     No HRT    Postmenopausal, LMP 36 yo, No HRT history 3/18/2010    Recurrent cystitis 2014    Right knee pain 3/24/2014    2014; CSI per Ortho Dr Tolliver Pronto office    Right shoulder pain 3/8/2012    S/P abdominal hysterectomy and left salpingo-oophorectomy     Uterine Fibroids; no HRT    S/P colonoscopy with polypectomy     benign polyp, f/u 5yrs; Dr. Mahesh Tolbert S/P SCC excised from left hand     SCC (squamous cell carcinoma), hand -    Lt hand, SCC excised, Dr. Stormy Pelletier SVT (supraventricular tachycardia) ([facility] [address] 2019    Ad[facility] to [facility] 18 for chest pressure and palpitations; medications adjusted, Cardio Dr Amos Ellis for Echo and stress test    TIA (transient ischemic attack) 2013      Past Surgical History:   Procedure Laterality Date    COLONOSCOPY  2006    q5yrs; Dr Hunter Cordova; Diverticulosis, internal hemorrhoids, benign polyp    ECHO 2D ADULT      Normal    ELECTROCARDIOGRAM REPORT  ,     Normal    HM DEXA SCAN  10/2007    Osteopenia; spine -1.5, fem neck -1.1    HX CATARACT REMOVAL Bilateral 2/15/2018 and 2018    [facility]    HX COLONOSCOPY  2013    Diverticulosis only, f/u 10 yrs, Dr Starla Koehler Right     2 cyss removed from right middle finger    HX HYSTERECTOMY      USO; NO HRT    HX LAP CHOLECYSTECTOMY      HX ORTHOPAEDIC      benign tumor removed from left wrist    HX ORTHOPAEDIC Right 16, Dr Fallon Jorgensen    right foot pin/plate    HX OTHER SURGICAL      Dermatology :  Removal a melamona left side of face     HX TONSILLECTOMY  1950    T&A    FL COLONOSCOPY FLX DX W/COLLJ SPEC WHEN PFRMD  5/2008 , repeat 5 yrs    for rectal bleeding; hemorrhoids, diverticulae, small polyp     Current Outpatient Medications   Medication Sig Dispense Refill    famotidine (PEPCID) 40 mg tablet TAKE ONE TABLET BY MOUTH EVERY EVENING 90 Tablet 1    colestipoL (COLESTID) 1 gram tablet Take 1 Tablet by mouth two (2) times a day. 180 Tablet 0    solifenacin (VESICARE) 10 mg tablet TAKE ONE TABLET BY MOUTH ONE TIME DAILY 90 Tab 3    clopidogreL (PLAVIX) 75 mg tab TAKE ONE TABLET BY MOUTH ONE TIME DAILY 90 Tab 3    metoprolol tartrate (LOPRESSOR) 25 mg tablet TAKE ONE TABLET BY MOUTH EVERY 12 HOURS 180 Tab 3    simvastatin (ZOCOR) 10 mg tablet TAKE ONE TABLET BY MOUTH EVERY EVENING 90 Tab 3    vit A/vit C/vit E/zinc/copper (OCUVITE PRESERVISION PO) Take  by mouth daily.  losartan-hydroCHLOROthiazide (HYZAAR) 100-25 mg per tablet Take 1 Tab by mouth daily. Changed from plain Losartan 100 mg daily per cardio 12-25-18      dilTIAZem CD (CARDIZEM CD) 180 mg ER capsule Take 1 Cap by mouth daily. Started per cardio 12-25-18 for SVT      calcium-cholecalciferol, D3, (CALTRATE 600+D) tablet Take 1 Tab by mouth daily.  acetaminophen (TYLENOL) 650 mg CR tablet Take 1,300 mg by mouth daily as needed for Pain.  EPIPEN 2-MONICA 0.3 mg/0.3 mL (1:1,000) injection USE IMMEDIATELY AS NEEDED FOR ALLERGIC REACTION TO BEE STING 2 Each 1    L. acidoph & paracasei- S therm- Bifido (JULIETA-Q) 8 billion cell cap cap Take 1 Cap by mouth daily. 10 Cap 0    vitamin e 400 unit Tab Take  by mouth daily.  multivitamins-minerals-lutein (CENTRUM SILVER) Tab Take 1 Tab by mouth daily.        Allergies   Allergen Reactions    Oxycodone Hives, Nausea Only and Other (comments)    Ace Inhibitors Cough    Bee Sting [Sting, Bee] Swelling    Fish Oil Other (comments)     GI upset    Lisinopril Cough    Motrin [Ibuprofen] Swelling and Myalgia  Oxycodone-Acetaminophen Nausea and Vomiting    Pcn [Penicillins] Swelling    Promethazine-Dm Other (comments)     HA, nightmares       Family History   Problem Relation Age of Onset    Thyroid Disease Mother         h/o Grave's dz;  age 81yo   [facility] Osteoporosis Mother    [facility] Delayed Awakening Mother    [facility] Stroke Father          age 80    Other Father         AAA age 76, macular degeneration    Alcohol abuse Father         smoker and drinker    Osteoporosis Sister     Other Son         gout    Gout Son     Dementia Maternal Grandmother         dementia and mental illness    Other Brother         heart murmur    Liver Disease Brother         hepatitis c    Anesth Problems Neg Hx      Social History     Tobacco Use    Smoking status: Never Smoker    Smokeless tobacco: Never Used   Substance Use Topics    Alcohol use:  Yes     Alcohol/week: 1.7 standard drinks     Types: 2 Glasses of wine per week     Comment: usually 2 glasses of wine per week but none at all since 19 due to GI issues         Michael Vick MD

## 2021-12-08 NOTE — PROGRESS NOTES
Chief Complaint   Patient presents with    Annual Wellness Visit    Diabetes    Hypertension    Cholesterol Problem     fasting     1. \"Have you been to the ER, urgent care clinic since your last visit? Hospitalized since your last visit? \" Yes Where: [address] in May  [facility] in August    2. \"Have you seen or consulted any other health care providers outside of the [address] [place] [address] since your last visit? \" Yes Where: Dr Martine Gustafson and Dr Shae Akers @ [facility]     3. For patients over 45: Has the patient had a colonoscopy? In system Dr Corona Heading    If the patient is female:    4. For patients over 40: Has the patient had a mammogram? [facility] in the fall    5. For patients over 21: Has the patient had a pap smear?  NA

## 2021-12-08 NOTE — PROGRESS NOTES
Chief Complaint   Patient presents with    Annual Wellness Visit    Diabetes     Fasting follow up    Cholesterol Problem    Hypertension       HISTORY OF PRESENT ILLNESS   HPI  Fasting follow up Type 2 NIDDM, Hypercholesterolemia, Hypertension, Labs & Medication Check  Complying routinely w/ medications. Tolerating w/o reaction or side effects. Not checking home BS's. Feels pretty well overall. Sees cardiologist annually for h/o SVT. Last seen by Dr. Pavel Alfredo ~ 1 month ago. Stable. No changes made. Diet: Mediterranean Diet since 8-2021  Caffeine: 1 cup of coffee qam, no tea, occ soda  Exercise: walks daily x 30 minutes, active in her yard  Weight: down from 255 lbs in 2019 to 212 at present   [facility]. Initially for what was thought to be PI diagnosed by local GI group. She has weaned off of Creon ~ October and states so far things remain stable. She also has not needed Pepcid as much  Learning her GI tolerance of things  Has made dietary modifications. Weight is down. In general feeling better overall. Has follow up at [facility] again 2-2022. Getting labs done for them prior. Taking Vesicare for OAB. Not helping as much as before especially in the evenings. On max dose Vesicare. Will follow up w/ Urology. REVIEW OF SYMPTOMS   Review of Systems   Constitutional: Negative. Respiratory: Negative. Cardiovascular: Negative. Musculoskeletal: Negative for myalgias. Neurological: Negative. Endo/Heme/Allergies: Negative. Psychiatric/Behavioral: Negative.             PROBLEM LIST/MEDICAL HISTORY     Problem List  Date Reviewed: 12/8/2021          Codes Class Noted    Cataracts, bilateral ICD-10-CM: H26.9  ICD-9-CM: 366.9  2/3/2017    Overview Addendum 8/28/2017  8:32 AM by Deidra Beard MD     R>L, Dr Sohan Brandt, changing to Dr Anyi Lyons 2017             TIA (transient ischemic attack) ICD-10-CM: G45.9  ICD-9-CM: 435.9  11/18/2016    Overview Signed 11/18/2016 11:02 AM by Bob Miguel MD     [address] [place] [address] 16-11/3/16, Neuro Dr Justina Sandifer             Diabetes mellitus type 2, noninsulin dependent ([facility] ICD-10-CM: E11.9  ICD-9-CM: 250.00  2016    Overview Signed 2016  1:01 PM by Bob Miguel MD     10/2014             ACP (advance care planning) ICD-10-CM: Z71.89  ICD-9-CM: V65.49  2016    Overview Signed 2016  8:36 AM by Bob Miguel MD     Patient has an AMD             Benign essential hypertension ICD-10-CM: I10  ICD-9-CM: 401.1  2016        Bee sting allergy with anaphylaxis reaction ICD-10-CM: Z91.030  ICD-9-CM: V15.06  2015        Arthritis of right foot & ankle ICD-10-CM: M19.071  ICD-9-CM: 716.97  2015    Overview Signed 2015 10:38 AM by Bob Miguel MD     Ortho Dr Artur Cox; used a boot x 2 months, off              Right knee pain ICD-10-CM: M25.561  ICD-9-CM: 719.46  3/24/2014    Overview Signed 3/24/2014 11:45 AM by Bob Miguel MD     2014; CSI per Ortho Dr Ramos Doing office             Hypercholesterolemia ICD-10-CM: E78.00  ICD-9-CM: 272.0  1/3/2014        Vertebrobasilar artery syndrome ICD-10-CM: G45.0  ICD-9-CM: 435.3  2013    Overview Addendum 2013 12:53 PM by Bob Miguel MD     ER; Neuro Dr Wade Gloria; dc'd on ASA, Zocor and Metoprolol             Impaired fasting glucose ICD-10-CM: R73.01  ICD-9-CM: 790.21  4/3/2012    Overview Signed 4/3/2012  3:09 PM by Bob Miguel MD     3/2012             Overactive bladder ICD-10-CM: U61.30  ICD-9-CM: 596.51  3/28/2011    Overview Signed 2021 10:10 AM by Bob Miguel MD     Urology Dr. Essence Syed             S/P colonoscopy with polypectomy ICD-10-CM: T67.636  ICD-9-CM: V45.89  Unknown    Overview Signed 3/29/2010 12:47 PM by Bob Miguel MD     benign polyp, f/u 5yrs; Dr. Andrew Griggs              (normal spontaneous vaginal delivery) ICD-10-CM: O80  ICD-9-CM: 650  Unknown    Overview Signed 3/29/2010 12:48 PM by Mike Henderson MD     A2, NSVDx3, SAB x2             S/P abdominal hysterectomy and left salpingo-oophorectomy ICD-10-CM: Z90.710, Z90.79, Z90.721  ICD-9-CM: V88.01  Unknown    Overview Signed 3/29/2010 12:49 PM by Mike Henderson MD     Uterine Fibroids; no HRT             S/P SCC excised from left hand ICD-10-CM: C44.621  ICD-9-CM: 173.62  Unknown    Overview Signed 3/29/2010 12:54 PM by Mike Henderson MD     Lt hand, SCC excised, Dr. Shantel Crum             Postmenopausal, LMP 38 yo, No HRT history ICD-10-CM: Z78.0  ICD-9-CM: V49.81  3/18/2010        Osteopenia ICD-10-CM: M85.80  ICD-9-CM: 733.90  10/3/2007                  PAST SURGICAL HISTORY     Past Surgical History:   Procedure Laterality Date    COLONOSCOPY  2006    q5yrs; Dr Raffy Marquez; Diverticulosis, internal hemorrhoids, benign polyp    ECHO 2D ADULT      Normal    ELECTROCARDIOGRAM REPORT  ,     Normal    HM DEXA SCAN  10/2007    Osteopenia; spine -1.5, fem neck -1.1    HX CATARACT REMOVAL Bilateral 2/15/2018 and 2018    [facility]    HX COLONOSCOPY  2013    Diverticulosis only, f/u 10 yrs, Dr Burt Martínez Right     2 cyss removed from right middle finger    HX HYSTERECTOMY      USO; NO HRT    HX LAP CHOLECYSTECTOMY      HX ORTHOPAEDIC      benign tumor removed from left wrist    HX ORTHOPAEDIC Right 16, Dr Julito Hastings    right foot pin/plate    HX OTHER SURGICAL      Dermatology :  Removal a melamona left side of face     HX TONSILLECTOMY  1950    T&A    IN COLONOSCOPY FLX DX W/COLLJ SPEC WHEN PFRMD  2008 , repeat 5 yrs    for rectal bleeding; hemorrhoids, diverticulae, small polyp         MEDICATIONS     Current Outpatient Medications   Medication Sig    famotidine (PEPCID) 40 mg tablet TAKE ONE TABLET BY MOUTH EVERY EVENING    colestipoL (COLESTID) 1 gram tablet Take 1 Tablet by mouth two (2) times a day.    solifenacin (VESICARE) 10 mg tablet TAKE ONE TABLET BY MOUTH ONE TIME DAILY    clopidogreL (PLAVIX) 75 mg tab TAKE ONE TABLET BY MOUTH ONE TIME DAILY    metoprolol tartrate (LOPRESSOR) 25 mg tablet TAKE ONE TABLET BY MOUTH EVERY 12 HOURS    simvastatin (ZOCOR) 10 mg tablet TAKE ONE TABLET BY MOUTH EVERY EVENING    vit A/vit C/vit E/zinc/copper (OCUVITE PRESERVISION PO) Take  by mouth daily.  losartan-hydroCHLOROthiazide (HYZAAR) 100-25 mg per tablet Take 1 Tab by mouth daily. Changed from plain Losartan 100 mg daily per cardio 12-25-18    dilTIAZem CD (CARDIZEM CD) 180 mg ER capsule Take 1 Cap by mouth daily. Started per cardio 12-25-18 for SVT    calcium-cholecalciferol, D3, (CALTRATE 600+D) tablet Take 1 Tab by mouth daily.  acetaminophen (TYLENOL) 650 mg CR tablet Take 1,300 mg by mouth daily as needed for Pain.  EPIPEN 2-MONICA 0.3 mg/0.3 mL (1:1,000) injection USE IMMEDIATELY AS NEEDED FOR ALLERGIC REACTION TO BEE STING    L. acidoph & paracasei- S therm- Bifido (JULIETA-Q) 8 billion cell cap cap Take 1 Cap by mouth daily.  vitamin e 400 unit Tab Take  by mouth daily.  multivitamins-minerals-lutein (CENTRUM SILVER) Tab Take 1 Tab by mouth daily. No current facility-administered medications for this visit. ALLERGIES     Allergies   Allergen Reactions    Oxycodone Hives, Nausea Only and Other (comments)    Ace Inhibitors Cough    Bee Sting [Sting, Bee] Swelling    Fish Oil Other (comments)     GI upset    Lisinopril Cough    Motrin [Ibuprofen] Swelling and Myalgia    Oxycodone-Acetaminophen Nausea and Vomiting    Pcn [Penicillins] Swelling    Promethazine-Dm Other (comments)     HA, nightmares          SOCIAL HISTORY     Social History     Tobacco Use    Smoking status: Never Smoker    Smokeless tobacco: Never Used   Substance Use Topics    Alcohol use:  Yes     Alcohol/week: 1.7 standard drinks     Types: 2 Glasses of wine per week     Comment: usually 2 glasses of wine per week but none at all since 19 due to GI issues     Social History     Social History Narrative    , 3 children    Retired  provider    Patient has an AMD    Diet: Mediterranean Diet since     Caffeine: 1 cup of coffee qam, no tea, occ soda    Exercise: walks daily x 30 minutes, active in her yard    Weight: down from 255 lbs in 2019 to 212 at present          Social History     Substance and Sexual Activity   Sexual Activity Yes    Partners: Male       IMMUNIZATIONS     Immunization History   Administered Date(s) Administered    (RETIRED) Pneumococcal Vaccine (Unspecified Type) 2009    COVID-19, Valarie Kylee, Primary or Immunocompromised Series, MRNA, PF, 100mcg/0.5mL 02/15/2021, 03/15/2021, 2021    Influenza High Dose Vaccine PF 2016, 2017, 10/30/2019, 2021    Influenza Vaccine 2015    Influenza Vaccine (Tri) Adjuvanted (>65 Yrs FLUAD TRI 10914) 2018, 10/30/2019    Influenza Vaccine Split 10/01/2012    Influenza, Quadrivalent, Adjuvanted (>65 Yrs FLUAD QUAD 11433) 2020    Pneumococcal Conjugate (PCV-13) 2018    Pneumococcal Polysaccharide (PPSV-23) 2014, 02/10/2020    TD Vaccine 1999    TDAP Vaccine 2009    Td, Adsorbed PF 2015    Varicella Virus Vaccine Live 10/01/2011    Zoster 2011    Zoster Recombinant 05/10/2018, 2018         FAMILY HISTORY     Family History   Problem Relation Age of Onset    Thyroid Disease Mother         h/o Grave's dz;  age 81yo   [facility] Osteoporosis Mother    [facility] Delayed Awakening Mother    [facility] Stroke Father          age 80    Other Father         AAA age 76, macular degeneration    Alcohol abuse Father         smoker and drinker    Osteoporosis Sister     Other Son         gout    Gout Son     Dementia Maternal Grandmother         dementia and mental illness    Other Brother         heart murmur    Liver Disease Brother hepatitis c    Anesth Problems Neg Hx          VITALS     Visit Vitals  /72 (BP 1 Location: Left upper arm, BP Patient Position: Sitting, BP Cuff Size: Large adult)   Pulse 71   Temp 98.1 °F (36.7 °C) (Oral)   Resp 16   Ht 5' 5\" (1.651 m)   Wt 212 lb 9.6 oz (96.4 kg)   SpO2 96%   BMI 35.38 kg/m²          PHYSICAL EXAMINATION   Physical Exam  Vitals reviewed. Constitutional:       General: She is not in acute distress. Appearance: Normal appearance. HENT:      Right Ear: Tympanic membrane normal.      Left Ear: Tympanic membrane normal.   Eyes:      Conjunctiva/sclera: Conjunctivae normal.   Cardiovascular:      Rate and Rhythm: Normal rate and regular rhythm. Pulses: Normal pulses. Heart sounds: Normal heart sounds. Pulmonary:      Effort: Pulmonary effort is normal.      Breath sounds: Normal breath sounds. Abdominal:      Palpations: Abdomen is soft. Tenderness: There is no abdominal tenderness. Musculoskeletal:         General: No swelling or tenderness. Cervical back: Neck supple. Feet:      Right foot:      Protective Sensation: 5 sites tested. 5 sites sensed. Skin integrity: Callus and dry skin present. Left foot:      Protective Sensation: 5 sites tested. 5 sites sensed. Skin integrity: Callus and dry skin present. Skin:     General: Skin is warm and dry. Neurological:      General: No focal deficit present. Mental Status: She is alert and oriented to person, place, and time. Psychiatric:         Mood and Affect: Mood normal.              ASSESSMENT & PLAN   Diagnoses and all orders for this visit:    1. Medicare annual wellness visit, subsequent  See separate note under this same encounter visit for Medicare Wellness note. 2. Diabetes mellitus type 2, noninsulin dependent ([facility] Controlled, A1c at diabetic goal  -     CBC W/O DIFF; Future  -     METABOLIC PANEL, COMPREHENSIVE; Future  -     LIPID PANEL;  Future  -     HEMOGLOBIN A1C WITH EAG; Future  -     MICROALBUMIN, UR, RAND W/ MICROALB/CREAT RATIO; Future  -     HM DIABETES FOOT EXAM      3. Hypercholesterolemia maintained on statin therapy  -     METABOLIC PANEL, COMPREHENSIVE; Future  -     LIPID PANEL; Future  -     TSH 3RD GENERATION; Future    4. Benign essential hypertension, controlled stable    Fasting labs checked today  Cardiovascular risk and specific lipid/LDL/BP/A1c goals assessed  Reviewed medications and side effects, doing well on current regimen  Reviewed diet, nutrition, exercise, weight management, BMI/goals. Age/risk based screening recommendations, health maintenance & prevention counseling. Cancer screening USPTFS guidelines reviewed w/ pt today. Discussed benefits/positive/negative outcomes of screening based on age/risk stratification. Informed consent for/against screening based on pt's personal hx/risk factors. Updated in history above and health maintenance. Further follow up & other recommendations pending review of labs.  If all remains good and stable, follow up in 6 months, sooner prn

## 2022-02-01 DIAGNOSIS — I10 BENIGN ESSENTIAL HYPERTENSION: ICD-10-CM

## 2022-02-01 DIAGNOSIS — G45.9 TIA (TRANSIENT ISCHEMIC ATTACK): ICD-10-CM

## 2022-02-01 DIAGNOSIS — E78.00 HYPERCHOLESTEROLEMIA: ICD-10-CM

## 2022-02-01 DIAGNOSIS — E11.9 DIABETES MELLITUS TYPE 2, NONINSULIN DEPENDENT (HCC): ICD-10-CM

## 2022-02-01 RX ORDER — METOPROLOL TARTRATE 25 MG/1
TABLET, FILM COATED ORAL
Qty: 180 TABLET | Refills: 3 | Status: SHIPPED | OUTPATIENT
Start: 2022-02-01

## 2022-02-01 RX ORDER — SIMVASTATIN 10 MG/1
TABLET, FILM COATED ORAL
Qty: 90 TABLET | Refills: 3 | Status: SHIPPED | OUTPATIENT
Start: 2022-02-01

## 2022-02-01 RX ORDER — CLOPIDOGREL BISULFATE 75 MG/1
TABLET ORAL
Qty: 90 TABLET | Refills: 3 | Status: SHIPPED | OUTPATIENT
Start: 2022-02-01

## 2022-03-19 PROBLEM — H26.9 CATARACTS, BILATERAL: Status: ACTIVE | Noted: 2017-02-03

## 2022-05-02 DIAGNOSIS — N32.81 OVERACTIVE BLADDER: ICD-10-CM

## 2022-05-02 RX ORDER — SOLIFENACIN SUCCINATE 10 MG/1
TABLET, FILM COATED ORAL
Qty: 90 TABLET | Refills: 1 | Status: SHIPPED | OUTPATIENT
Start: 2022-05-02

## 2022-07-12 DIAGNOSIS — K21.9 GASTROESOPHAGEAL REFLUX DISEASE, UNSPECIFIED WHETHER ESOPHAGITIS PRESENT: ICD-10-CM

## 2022-07-13 RX ORDER — FAMOTIDINE 40 MG/1
TABLET, FILM COATED ORAL
Qty: 90 TABLET | Refills: 1 | Status: SHIPPED | OUTPATIENT
Start: 2022-07-13

## 2022-07-13 NOTE — TELEPHONE ENCOUNTER
Requested Prescriptions     Pending Prescriptions Disp Refills    famotidine (PEPCID) 40 mg tablet [Pharmacy Med Name: FAMOTIDINE 40 MG TAB[*]] 90 Tablet 1     Sig: TAKE ONE TABLET BY MOUTH EVERY EVENING

## 2022-07-14 ENCOUNTER — PATIENT MESSAGE (OUTPATIENT)
Dept: FAMILY MEDICINE CLINIC | Age: 72
End: 2022-07-14

## 2022-07-14 ENCOUNTER — OFFICE VISIT (OUTPATIENT)
Dept: FAMILY MEDICINE CLINIC | Age: 72
End: 2022-07-14
Payer: MEDICARE

## 2022-07-14 VITALS
TEMPERATURE: 97.9 F | WEIGHT: 221 LBS | BODY MASS INDEX: 36.82 KG/M2 | HEART RATE: 73 BPM | RESPIRATION RATE: 16 BRPM | HEIGHT: 65 IN | OXYGEN SATURATION: 98 % | DIASTOLIC BLOOD PRESSURE: 65 MMHG | SYSTOLIC BLOOD PRESSURE: 99 MMHG

## 2022-07-14 DIAGNOSIS — N30.00 ACUTE CYSTITIS WITHOUT HEMATURIA: Primary | ICD-10-CM

## 2022-07-14 LAB
BILIRUB UR QL STRIP: NEGATIVE
GLUCOSE UR-MCNC: NEGATIVE MG/DL
KETONES P FAST UR STRIP-MCNC: NEGATIVE MG/DL
PH UR STRIP: 5.5 [PH] (ref 4.6–8)
PROT UR QL STRIP: NEGATIVE
SP GR UR STRIP: 1.01 (ref 1–1.03)
UA UROBILINOGEN AMB POC: NORMAL (ref 0.2–1)
URINALYSIS CLARITY POC: NORMAL
URINALYSIS COLOR POC: YELLOW
URINE BLOOD POC: NEGATIVE
URINE LEUKOCYTES POC: NORMAL
URINE NITRITES POC: POSITIVE

## 2022-07-14 PROCEDURE — G9899 SCRN MAM PERF RSLTS DOC: HCPCS | Performed by: NURSE PRACTITIONER

## 2022-07-14 PROCEDURE — G8752 SYS BP LESS 140: HCPCS | Performed by: NURSE PRACTITIONER

## 2022-07-14 PROCEDURE — 1101F PT FALLS ASSESS-DOCD LE1/YR: CPT | Performed by: NURSE PRACTITIONER

## 2022-07-14 PROCEDURE — G8427 DOCREV CUR MEDS BY ELIG CLIN: HCPCS | Performed by: NURSE PRACTITIONER

## 2022-07-14 PROCEDURE — 99213 OFFICE O/P EST LOW 20 MIN: CPT | Performed by: NURSE PRACTITIONER

## 2022-07-14 PROCEDURE — G8536 NO DOC ELDER MAL SCRN: HCPCS | Performed by: NURSE PRACTITIONER

## 2022-07-14 PROCEDURE — G8417 CALC BMI ABV UP PARAM F/U: HCPCS | Performed by: NURSE PRACTITIONER

## 2022-07-14 PROCEDURE — 3017F COLORECTAL CA SCREEN DOC REV: CPT | Performed by: NURSE PRACTITIONER

## 2022-07-14 PROCEDURE — G0463 HOSPITAL OUTPT CLINIC VISIT: HCPCS | Performed by: NURSE PRACTITIONER

## 2022-07-14 PROCEDURE — 81002 URINALYSIS NONAUTO W/O SCOPE: CPT | Performed by: NURSE PRACTITIONER

## 2022-07-14 PROCEDURE — G8399 PT W/DXA RESULTS DOCUMENT: HCPCS | Performed by: NURSE PRACTITIONER

## 2022-07-14 PROCEDURE — 1123F ACP DISCUSS/DSCN MKR DOCD: CPT | Performed by: NURSE PRACTITIONER

## 2022-07-14 PROCEDURE — G8754 DIAS BP LESS 90: HCPCS | Performed by: NURSE PRACTITIONER

## 2022-07-14 PROCEDURE — G8432 DEP SCR NOT DOC, RNG: HCPCS | Performed by: NURSE PRACTITIONER

## 2022-07-14 PROCEDURE — 1090F PRES/ABSN URINE INCON ASSESS: CPT | Performed by: NURSE PRACTITIONER

## 2022-07-14 RX ORDER — CETIRIZINE HYDROCHLORIDE 10 MG/1
CAPSULE, LIQUID FILLED ORAL
COMMUNITY

## 2022-07-14 RX ORDER — FLUCONAZOLE 150 MG/1
150 TABLET ORAL DAILY
Qty: 1 TABLET | Refills: 0 | Status: SHIPPED | OUTPATIENT
Start: 2022-07-14 | End: 2022-07-15

## 2022-07-14 RX ORDER — NITROFURANTOIN 25; 75 MG/1; MG/1
100 CAPSULE ORAL 2 TIMES DAILY
Qty: 14 CAPSULE | Refills: 0 | Status: SHIPPED | OUTPATIENT
Start: 2022-07-14 | End: 2022-07-21

## 2022-07-14 RX ORDER — PHENAZOPYRIDINE HYDROCHLORIDE 100 MG/1
100 TABLET, FILM COATED ORAL
Qty: 9 TABLET | Refills: 0 | Status: SHIPPED | OUTPATIENT
Start: 2022-07-14 | End: 2022-07-17

## 2022-07-14 RX ORDER — TROSPIUM CHLORIDE 20 MG/1
20 TABLET, FILM COATED ORAL 2 TIMES DAILY
COMMUNITY
Start: 2022-07-05

## 2022-07-14 NOTE — PROGRESS NOTES
[facility] [facility] Note     Monisha Lui (: 1950) is a 74 y.o. female, established patient, here for evaluation of the following chief complaint(s):  UTI (frequency, urgency, burning)       ASSESSMENT/PLAN:  1. Acute cystitis without hematuria  -     AMB POC URINALYSIS DIP STICK MANUAL W/O MICRO, Discussed result(s) with patient at the time of encounter.  -     nitrofurantoin, macrocrystal-monohydrate, (MACROBID) 100 mg capsule; Take 1 Capsule by mouth two (2) times a day for 7 days. , Normal, Disp-14 Capsule, R-0  -     phenazopyridine (PYRIDIUM) 100 mg tablet; Take 1 Tablet by mouth three (3) times daily as needed for Pain for up to 3 days. , Normal, Disp-9 Tablet, R-0  -     CULTURE, URINE; Future        Return if symptoms worsen or fail to improve. SUBJECTIVE/OBJECTIVE:    Monisha Lui is a 74 y.o. female seen today for UTI symptoms. Starting last night urgency, dribble, burn,  Kept up during night  Increased water intake      Urinary Symptoms  Ms. Katharien Nathan is followed by Urology, Dr. Gage Muhammad who complains of dysuria, frequency, urgency, and dribbling. Onset was 1 day ago, unchanged since that time. . Patient does have a history of recurrent UTI. Patient does not have a history of pyelonephritis. Denies fever. REVIEW OF SYSTEMS:    Review of Systems   Genitourinary: Positive for dysuria, frequency and urgency. All other systems reviewed and are negative.         VITAL SIGNS:    Wt Readings from Last 3 Encounters:   22 221 lb (100.2 kg)   21 212 lb 9.6 oz (96.4 kg)   21 218 lb 12.8 oz (99.2 kg)     Temp Readings from Last 3 Encounters:   22 97.9 °F (36.6 °C) (Temporal)   21 98.1 °F (36.7 °C) (Oral)   21 98.4 °F (36.9 °C) (Temporal)     BP Readings from Last 3 Encounters:   22 99/65   21 136/72   21 130/74     Pulse Readings from Last 3 Encounters:   22 73   21 71   21 78 PHYSICAL EXAMINATION:       General: Alert, cooperative, no distress  Respiratory: Breathing comfortably, in no acute respiratory distress. Clear to auscultation bilaterally. Cardiovascular: Regular rate and rhythm, S1, S2 normal, no murmur, click, rub or gallop. Extremities: no edema. Abdomen: Soft, non-tender, not distended. Bowel sounds normal. No masses or organomegaly. MSK: no CVA tenderness, Extremities normal appearing, atraumatic, no effusion. Gait steady and unassisted. Skin: Skin color, texture, turgor normal. No rashes or lesions on exposed skin. Neurologic: A/Ox3  Psychiatric: Normal affect. Mood euthymic. Thoughts logical. Speech volume and speed normal            Treatment risks/benefits/costs/interactions/alternatives discussed with patient. Advised patient to call back or return to office if symptoms worsen/change/persist. If patient cannot reach us or should anything more severe/urgent arise he/she should proceed directly to the nearest emergency department. Discussed expected course/resolution/complications of diagnosis in detail with patient. Patient expressed understanding with the diagnosis and plan. An electronic signature was used to authenticate this note.   -- Skylar Wang NP

## 2022-07-14 NOTE — PATIENT INSTRUCTIONS
Painful Urination (Dysuria): Care Instructions  Your Care Instructions  Burning pain with urination (dysuria) is a common symptom of a urinary tract infection or other urinary problems. The bladder may become inflamed. This can cause pain when the bladder fills and empties. You may also feel pain if the tube that carries urine from the bladder to the outside of the body (urethra) gets irritated or infected. Sexually transmitted infections (STIs) also may cause pain when you urinate. Sometimes the pain can be caused by things other than an infection. The urethra can be irritated by soaps, perfumes, or foreign objects in the urethra. Kidney stones can cause pain when they pass through the urethra. The cause may be hard to find. You may need tests. Treatment for painful urination depends on the cause. Follow-up care is a key part of your treatment and safety. Be sure to make and go to all appointments, and call your doctor if you are having problems. It's also a good idea to know your test results and keep a list of the medicines you take. How can you care for yourself at home? · Drink extra water for the next day or two. This will help make the urine less concentrated. (If you have kidney, heart, or liver disease and have to limit fluids, talk with your doctor before you increase the amount of fluids you drink.)  · Avoid drinks that are carbonated or have caffeine. They can irritate the bladder. · Urinate often. Try to empty your bladder each time. For women:  · Urinate right after you have sex. · After going to the bathroom, wipe from front to back. · Avoid douches, bubble baths, and feminine hygiene sprays. And avoid other feminine hygiene products that have deodorants. When should you call for help? Call your doctor now or seek immediate medical care if:    · You have new symptoms, such as fever, nausea, or vomiting.     · You have new or worse symptoms of a urinary problem. For example:  ?  You have blood or pus in your urine. ? You have chills or body aches. ? It hurts worse to urinate. ? You have groin or belly pain. ? You have pain in your back just below your rib cage (the flank area). Watch closely for changes in your health, and be sure to contact your doctor if you have any problems. Where can you learn more? Go to http://www.gray.com/  Enter H814 in the search box to learn more about \"Painful Urination (Dysuria): Care Instructions. \"  Current as of: October 18, 2021               Content Version: 13.2  © 2614-2417 [employer]. Care instructions adapted under license by [employer] (which disclaims liability or warranty for this information). If you have questions about a medical condition or this instruction, always ask your healthcare professional. [address] any warranty or liability for your use of this information.

## 2022-07-14 NOTE — PROGRESS NOTES
Chief Complaint   Patient presents with    UTI     frequency, urgency, burning         1. \"Have you been to the ER, urgent care clinic since your last visit? Hospitalized since your last visit? \" No    2. \"Have you seen or consulted any other health care providers outside of the [address] [place] [address] since your last visit? \" Yes Where: dr Brisa Patel at [facility]     3. For patients over 45: Has the patient had a colonoscopy? Yes - no Care Gap present     If the patient is female:    4. For patients over 40: Has the patient had a mammogram? Yes - no Care Gap present    5. For patients over 21: Has the patient had a pap smear?  Yes - no Care Gap present     3 most recent PHQ Screens 12/8/2021   PHQ Not Done -   Little interest or pleasure in doing things Not at all   Feeling down, depressed, irritable, or hopeless Not at all   Total Score PHQ 2 0       Health Maintenance Due   Topic Date Due    Eye Exam Retinal or Dilated  02/03/2022

## 2022-07-15 LAB
COMMENT, HOLDF: NORMAL
SAMPLES BEING HELD,HOLD: NORMAL

## 2022-07-17 LAB
BACTERIA SPEC CULT: ABNORMAL
CC UR VC: ABNORMAL
SERVICE CMNT-IMP: ABNORMAL

## 2022-07-18 NOTE — PROGRESS NOTES
Please inform the patient her urine culture has resulted and growing Klebsiella. The nitrofurantoin prescription may help resolve the infection but may not be as potent enough given its intermediate susceptibility to this organism. Should her symptoms persist an alternative antibiotic would be considered at that time.

## 2022-12-12 ENCOUNTER — OFFICE VISIT (OUTPATIENT)
Dept: FAMILY MEDICINE CLINIC | Age: 72
End: 2022-12-12

## 2022-12-22 ENCOUNTER — HOSPITAL ENCOUNTER (OUTPATIENT)
Dept: ULTRASOUND IMAGING | Age: 72
Discharge: HOME | End: 2022-12-22
Attending: STUDENT IN AN ORGANIZED HEALTH CARE EDUCATION/TRAINING PROGRAM
Payer: MEDICARE

## 2022-12-22 ENCOUNTER — OFFICE VISIT (OUTPATIENT)
Dept: FAMILY MEDICINE CLINIC | Age: 72
End: 2022-12-22
Payer: MEDICARE

## 2022-12-22 VITALS
TEMPERATURE: 98.2 F | HEIGHT: 65 IN | SYSTOLIC BLOOD PRESSURE: 112 MMHG | HEART RATE: 75 BPM | RESPIRATION RATE: 17 BRPM | BODY MASS INDEX: 37.82 KG/M2 | DIASTOLIC BLOOD PRESSURE: 69 MMHG | WEIGHT: 227 LBS | OXYGEN SATURATION: 98 %

## 2022-12-22 DIAGNOSIS — E66.01 SEVERE OBESITY (BMI 35.0-39.9) WITH COMORBIDITY (HCC): ICD-10-CM

## 2022-12-22 DIAGNOSIS — R35.0 URINARY FREQUENCY: ICD-10-CM

## 2022-12-22 DIAGNOSIS — M79.89 PAIN AND SWELLING OF RIGHT LOWER LEG: ICD-10-CM

## 2022-12-22 DIAGNOSIS — M79.661 PAIN AND SWELLING OF RIGHT LOWER LEG: ICD-10-CM

## 2022-12-22 DIAGNOSIS — M79.661 PAIN AND SWELLING OF RIGHT LOWER LEG: Primary | ICD-10-CM

## 2022-12-22 DIAGNOSIS — M79.89 PAIN AND SWELLING OF RIGHT LOWER LEG: Primary | ICD-10-CM

## 2022-12-22 DIAGNOSIS — N39.0 URINARY TRACT INFECTION WITHOUT HEMATURIA, SITE UNSPECIFIED: ICD-10-CM

## 2022-12-22 LAB
BILIRUB UR QL STRIP: NEGATIVE
GLUCOSE UR-MCNC: NEGATIVE MG/DL
KETONES P FAST UR STRIP-MCNC: ABNORMAL MG/DL
PH UR STRIP: 5.5 [PH] (ref 4.6–8)
PROT UR QL STRIP: NEGATIVE
SP GR UR STRIP: 1.01 (ref 1–1.03)
UA UROBILINOGEN AMB POC: ABNORMAL (ref 0.2–1)
URINALYSIS CLARITY POC: ABNORMAL
URINALYSIS COLOR POC: YELLOW
URINE BLOOD POC: NEGATIVE
URINE LEUKOCYTES POC: ABNORMAL
URINE NITRITES POC: POSITIVE

## 2022-12-22 PROCEDURE — G0463 HOSPITAL OUTPT CLINIC VISIT: HCPCS | Performed by: STUDENT IN AN ORGANIZED HEALTH CARE EDUCATION/TRAINING PROGRAM

## 2022-12-22 PROCEDURE — 81001 URINALYSIS AUTO W/SCOPE: CPT | Performed by: STUDENT IN AN ORGANIZED HEALTH CARE EDUCATION/TRAINING PROGRAM

## 2022-12-22 PROCEDURE — 93970 EXTREMITY STUDY: CPT

## 2022-12-22 RX ORDER — CEPHALEXIN 500 MG/1
500 CAPSULE ORAL 4 TIMES DAILY
Qty: 20 CAPSULE | Refills: 0 | Status: SHIPPED | OUTPATIENT
Start: 2022-12-22 | End: 2022-12-27

## 2022-12-22 RX ORDER — FLUCONAZOLE 150 MG/1
150 TABLET ORAL DAILY
Qty: 1 TABLET | Refills: 0 | Status: SHIPPED | OUTPATIENT
Start: 2022-12-22 | End: 2022-12-23

## 2022-12-22 NOTE — PROGRESS NOTES
Chief Complaint   Patient presents with    Leg Pain     Patient states right leg is swelling, Pain started on Monday 12/19/2022.         1. \"Have you been to the ER, urgent care clinic since your last visit? Hospitalized since your last visit? \" No    2. \"Have you seen or consulted any other health care providers outside of the [address] [place] [address] since your last visit? \" No     3. For patients aged 39-70: Has the patient had a colonoscopy / FIT/ Cologuard? Yes - no Care Gap present      If the patient is female:    4. For patients aged 41-77: Has the patient had a mammogram within the past 2 years? Yes - no Care Gap present      5. For patients aged 21-65: Has the patient had a pap smear?  No         3 most recent PHQ Screens 12/22/2022   PHQ Not Done -   Little interest or pleasure in doing things Not at all   Feeling down, depressed, irritable, or hopeless Not at all   Total Score PHQ 2 0       Health Maintenance Due   Topic Date Due    Eye Exam Retinal or Dilated  02/03/2022    Foot Exam Q1  12/08/2022    A1C test (Diabetic or Prediabetic)  12/08/2022    MICROALBUMIN Q1  12/08/2022    Depression Screen  12/08/2022    Lipid Screen  12/08/2022    Medicare Yearly Exam  12/09/2022

## 2022-12-22 NOTE — PROGRESS NOTES
[facility] PRACTICE      Chief Complaint:     Chief Complaint   Patient presents with    Leg Pain     Patient states right leg is swelling, Pain started on Monday 12/19/2022. Casey Beal is a 74 y.o. female that presents for: leg pain      Assessment/Plan:     Diagnoses and all orders for this visit:    1. Pain and swelling of right lower leg: high suspicion for DVT with recent travel/ unilateral leg swelling and tenderness. [id number] appointment for 6pm tonight.  -     DUPLEX LOWER EXT VENOUS BILAT; Future    2. Severe obesity (BMI 35.0-39. 9) with comorbidity ([address]    3. Urinary frequency  -     AMB POC URINALYSIS DIP STICK AUTO W/ MICRO  -     CULTURE, URINE; Future    4. Urinary tract infection without hematuria, site unspecified: UA with nitrites, ketones, LE. Usually gets yeast infection with abx.   -     cephALEXin (KEFLEX) 500 mg capsule; Take 1 Capsule by mouth four (4) times daily for 5 days. -     fluconazole (DIFLUCAN) 150 mg tablet; Take 1 Tablet by mouth daily for 1 day. FDA advises cautious prescribing of oral fluconazole in pregnancy.          Follow up:      PRN    Subjective:   HPI:  Casey Beal is a 74 y.o. female with hx of DM, TIA, that presents for:    Leg pain:  4 days; no injuries   Starts behind right knee and radiates up thigh and down calf  No hx of blood clots  Last week drove 9 hrs in the car     Denies chest pain, sob, palpitation, fevers, chills     Urinary frequency:  X 1 day  No dysuria, hematuria, fevers, chills, back pain  Hx of UTI and this is how they start    Health Maintenance:  Health Maintenance Due   Topic Date Due    Eye Exam Retinal or Dilated  02/03/2022    Foot Exam Q1  12/08/2022    A1C test (Diabetic or Prediabetic)  12/08/2022    MICROALBUMIN Q1  12/08/2022    Depression Screen  12/08/2022    Lipid Screen  12/08/2022    Medicare Yearly Exam  12/09/2022        ROS:   See HPI      Past medical history, social history, and medications personally reviewed. Past Medical History:   Diagnosis Date    ACP (advance care planning) 2016    Patient has an AMD    Adverse effect of anesthesia     difficulty waking and cold postop    Arthritis     Hip    Arthritis of right foot & ankle 2015    Ortho Dr Laverne Bueno; used a boot x 2 months, off -    Bee sting allergy with anaphylaxis reaction 2015    Benign essential hypertension 2016    Candida intertrigo 3/29/2010    Cataracts, bilateral 2/3/2017    R>L, Dr Lazarus Nanas    Diabetes mellitus type 2, noninsulin dependent ([facility] [address] 2016    10/2014    Frequent UTI     Hx of complete eye exam 2018    [facility]-18    Hx of mammogram 2018    [facility] - No findings for malignancy identified-Yearly mammogram recommended     Hypercholesterolemia 1/3/2014     (normal spontaneous vaginal delivery)     A2, NSVDx3, SAB x2    Osteopenia 10/3/2007    Overactive bladder 3/28/2011    Pneumonia     Postmenopausal     No HRT    Postmenopausal, LMP 36 yo, No HRT history 3/18/2010    Recurrent cystitis 2014    Right knee pain 3/24/2014    2014; CSI per Ortho Dr Bryant White office    Right shoulder pain 3/8/2012    S/P abdominal hysterectomy and left salpingo-oophorectomy     Uterine Fibroids; no HRT    S/P colonoscopy with polypectomy     benign polyp, f/u 5yrs; Dr. Pillo Steiner    S/P SCC excised from left hand     SCC (squamous cell carcinoma), hand -    Lt hand, SCC excised, Dr. Abby Stapleton    SVT (supraventricular tachycardia) ([facility] [address] 2019    Admitted to [facility] 18 for chest pressure and palpitations; medications adjusted, Cardio Dr Kathrin Troy for Echo and stress test    TIA (transient ischemic attack) 2013        Allergies personally reviewed.   Allergies   Allergen Reactions    Oxycodone Hives, Nausea Only and Other (comments)    Ace Inhibitors Cough    Bee Sting [Sting, Bee] Swelling    Fish Oil Other (comments)     GI upset    Lisinopril Cough    Motrin [Ibuprofen] Swelling and Myalgia    Oxycodone-Acetaminophen Nausea and Vomiting    Pcn [Penicillins] Swelling    Promethazine-Dm Other (comments)     HA, nightmares          Objective:   Vitals reviewed. Visit Vitals  /69 (BP 1 Location: Left upper arm, BP Patient Position: Sitting, BP Cuff Size: Adult long)   Pulse 75   Temp 98.2 °F (36.8 °C) (Temporal)   Resp 17   Ht 5' 5\" (1.651 m)   Wt 227 lb (103 kg)   SpO2 98%   BMI 37.77 kg/m²        Wt Readings from Last 3 Encounters:   12/22/22 227 lb (103 kg)   07/14/22 221 lb (100.2 kg)   12/08/21 212 lb 9.6 oz (96.4 kg)        Physical Exam  Physical Exam     Vitals: Reviewed. General: AO x 3. No distress. Not diaphoretic. No jaundice. No cyanosis. No pallor. HEENT: No thyromegaly or JVD  Cardio: Normal rate, regular rhythm. No murmur, rubs, or gallop. Pulmonary: Effort normal. No accessory muscle use. No wheezes, rales, or rhonchi. Extremities: Right thigh swollen compared to left. +Calf squeeze on left. Tender in left popliteal fossa. Pulses 2+. Skin: No rash. I have reviewed pertinent labs results and other data. I have discussed the diagnosis with the patient and the intended plan as seen in the above orders. The patient has received an after-visit summary and questions were answered concerning future plans. I have discussed medication side effects and warnings with the patient as well.     Mariellen Dakins, DO  12/22/22

## 2022-12-25 LAB
BACTERIA SPEC CULT: ABNORMAL
CC UR VC: ABNORMAL
SERVICE CMNT-IMP: ABNORMAL

## 2023-01-24 DIAGNOSIS — E78.00 HYPERCHOLESTEROLEMIA: ICD-10-CM

## 2023-01-24 DIAGNOSIS — G45.9 TIA (TRANSIENT ISCHEMIC ATTACK): ICD-10-CM

## 2023-01-24 DIAGNOSIS — I10 BENIGN ESSENTIAL HYPERTENSION: ICD-10-CM

## 2023-01-24 DIAGNOSIS — E11.9 DIABETES MELLITUS TYPE 2, NONINSULIN DEPENDENT (HCC): ICD-10-CM

## 2023-01-24 RX ORDER — METOPROLOL TARTRATE 25 MG/1
TABLET, FILM COATED ORAL
Qty: 180 TABLET | Refills: 0 | Status: SHIPPED | OUTPATIENT
Start: 2023-01-24

## 2023-01-24 RX ORDER — SIMVASTATIN 10 MG/1
TABLET, FILM COATED ORAL
Qty: 90 TABLET | Refills: 0 | Status: SHIPPED | OUTPATIENT
Start: 2023-01-24

## 2023-01-24 RX ORDER — CLOPIDOGREL BISULFATE 75 MG/1
TABLET ORAL
Qty: 90 TABLET | Refills: 0 | Status: SHIPPED | OUTPATIENT
Start: 2023-01-24

## 2023-03-14 ENCOUNTER — OFFICE VISIT (OUTPATIENT)
Dept: FAMILY MEDICINE CLINIC | Age: 73
End: 2023-03-14
Payer: MEDICARE

## 2023-03-14 VITALS
OXYGEN SATURATION: 96 % | SYSTOLIC BLOOD PRESSURE: 122 MMHG | WEIGHT: 225.6 LBS | DIASTOLIC BLOOD PRESSURE: 74 MMHG | HEIGHT: 65 IN | RESPIRATION RATE: 16 BRPM | TEMPERATURE: 98 F | HEART RATE: 69 BPM | BODY MASS INDEX: 37.59 KG/M2

## 2023-03-14 DIAGNOSIS — E11.9 DIABETES MELLITUS TYPE 2, NONINSULIN DEPENDENT (HCC): ICD-10-CM

## 2023-03-14 DIAGNOSIS — Z00.00 MEDICARE ANNUAL WELLNESS VISIT, SUBSEQUENT: Primary | ICD-10-CM

## 2023-03-14 DIAGNOSIS — E78.00 HYPERCHOLESTEROLEMIA: ICD-10-CM

## 2023-03-14 DIAGNOSIS — I10 BENIGN ESSENTIAL HYPERTENSION: ICD-10-CM

## 2023-03-14 DIAGNOSIS — G45.9 TIA (TRANSIENT ISCHEMIC ATTACK): ICD-10-CM

## 2023-03-14 DIAGNOSIS — Z86.79 HISTORY OF SUPRAVENTRICULAR TACHYCARDIA: ICD-10-CM

## 2023-03-14 PROCEDURE — G8427 DOCREV CUR MEDS BY ELIG CLIN: HCPCS | Performed by: FAMILY MEDICINE

## 2023-03-14 PROCEDURE — 2022F DILAT RTA XM EVC RTNOPTHY: CPT | Performed by: FAMILY MEDICINE

## 2023-03-14 PROCEDURE — G8510 SCR DEP NEG, NO PLAN REQD: HCPCS | Performed by: FAMILY MEDICINE

## 2023-03-14 PROCEDURE — 3074F SYST BP LT 130 MM HG: CPT | Performed by: FAMILY MEDICINE

## 2023-03-14 PROCEDURE — G8399 PT W/DXA RESULTS DOCUMENT: HCPCS | Performed by: FAMILY MEDICINE

## 2023-03-14 PROCEDURE — 1123F ACP DISCUSS/DSCN MKR DOCD: CPT | Performed by: FAMILY MEDICINE

## 2023-03-14 PROCEDURE — 1090F PRES/ABSN URINE INCON ASSESS: CPT | Performed by: FAMILY MEDICINE

## 2023-03-14 PROCEDURE — G0439 PPPS, SUBSEQ VISIT: HCPCS | Performed by: FAMILY MEDICINE

## 2023-03-14 PROCEDURE — G8536 NO DOC ELDER MAL SCRN: HCPCS | Performed by: FAMILY MEDICINE

## 2023-03-14 PROCEDURE — 1101F PT FALLS ASSESS-DOCD LE1/YR: CPT | Performed by: FAMILY MEDICINE

## 2023-03-14 PROCEDURE — G0463 HOSPITAL OUTPT CLINIC VISIT: HCPCS | Performed by: FAMILY MEDICINE

## 2023-03-14 PROCEDURE — G8417 CALC BMI ABV UP PARAM F/U: HCPCS | Performed by: FAMILY MEDICINE

## 2023-03-14 PROCEDURE — 99213 OFFICE O/P EST LOW 20 MIN: CPT | Performed by: FAMILY MEDICINE

## 2023-03-14 PROCEDURE — 3046F HEMOGLOBIN A1C LEVEL >9.0%: CPT | Performed by: FAMILY MEDICINE

## 2023-03-14 PROCEDURE — 3078F DIAST BP <80 MM HG: CPT | Performed by: FAMILY MEDICINE

## 2023-03-14 PROCEDURE — 3017F COLORECTAL CA SCREEN DOC REV: CPT | Performed by: FAMILY MEDICINE

## 2023-03-14 NOTE — PROGRESS NOTES
This is the Subsequent Medicare Annual Wellness Exam, performed 12 months or more after the Initial AWV or the last Subsequent AWV    I have reviewed the patient's medical history in detail and updated the computerized patient record. Assessment/Plan   Education and counseling provided:  Are appropriate based on today's review and evaluation  Screening Mammography: Scheduled in 2 weeks  Colorectal cancer screening tests: Due this fall, will call to schedule    1. Medicare annual wellness visit, subsequent     Depression Risk Factor Screening     3 most recent PHQ Screens 3/14/2023   PHQ Not Done -   Little interest or pleasure in doing things Not at all   Feeling down, depressed, irritable, or hopeless Not at all   Total Score PHQ 2 0       Alcohol & Drug Abuse Risk Screen    Do you average more than 1 drink per night or more than 7 drinks a week:  No    On any one occasion in the past three months have you have had more than 3 drinks containing alcohol:  No          Functional Ability and Level of Safety    Hearing: Hearing is good. Activities of Daily Living: The home contains: no safety equipment.   Patient does total self care  ADL Assessment 3/14/2023   Feeding yourself No Help Needed   Getting from bed to chair No Help Needed   Getting dressed No Help Needed   Bathing or showering No Help Needed   Walk across the room (includes cane/walker) No Help Needed   Using the telphone No Help Needed   Taking your medications No Help Needed   Preparing meals No Help Needed   Managing money (expenses/bills) No Help Needed   Moderately strenuous housework (laundry) No Help Needed   Shopping for personal items (toiletries/medicines) No Help Needed   Shopping for groceries No Help Needed   Driving No Help Needed   Climbing a flight of stairs No Help Needed   Getting to places beyond walking distances No Help Needed         Ambulation: with slight difficulty due to foot, ankle, knee issues but does not require any assistive devices     Fall Risk:  Fall Risk Assessment, last 12 mths 3/14/2023   Able to walk? Yes   Fall in past 12 months? 0   Do you feel unsteady? 1   Are you worried about falling 1   Is TUG test greater than 12 seconds? -   Is the gait abnormal? 0   Number of falls in past 12 months -   Fall with injury?  -      Abuse Screen:  Patient is not abused       Cognitive Screening    Has your family/caregiver stated any concerns about your memory: no         Health Maintenance Due     Health Maintenance Due   Topic Date Due    Eye Exam Retinal or Dilated  2022    A1C test (Diabetic or Prediabetic)  2022    Foot Exam Q1  2022    Lipid Screen  2022       Patient Care Team   Patient Care Team:  Sheri Siegel MD as PCP - Kenia Burnett MD as PCP - [facility]  Jarret Ocampo MD (Cardiovascular Disease Physician)    History     Patient Active Problem List   Diagnosis Code    Postmenopausal, LMP 36 yo, No HRT history Z78.0    History of pneumonia Z87.01    Osteopenia M85.80    S/P colonoscopy with polypectomy Z98.890     (normal spontaneous vaginal delivery) O80    S/P abdominal hysterectomy and left salpingo-oophorectomy Z90.710, Z90.79, Z90.721    S/P SCC excised from left hand C44.621    Overactive bladder N32.81    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    Hypercholesterolemia E78.00    Right knee pain M25.561    Recurrent cystitis N30.90    Arthritis of right foot & ankle M19.071    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.030    ACP (advance care planning) Z71.89    Benign essential hypertension I10    TIA (transient ischemic attack) G45.9    Diabetes mellitus type 2, noninsulin dependent ([facility] [address] E11.9    Cataracts, bilateral H26.9     Past Medical History:   Diagnosis Date    ACP (advance care planning) 2016    Patient has an AMD    Adverse effect of anesthesia     difficulty waking and cold postop    Arthritis     Hip    Arthritis of right foot & ankle 2015    Ortho Dr Scottie Pena; used a boot x 2 months, off     Bee sting allergy with anaphylaxis reaction 2015    Benign essential hypertension 2016    Candida intertrigo 3/29/2010    Cataracts, bilateral 2/3/2017    R>L, Dr Low Sahu    Diabetes mellitus type 2, noninsulin dependent ([facility] [address] 2016    10/2014    History of pneumonia 3/18/2010    5750,0460    Hypercholesterolemia 1/3/2014     (normal spontaneous vaginal delivery)     A2, NSVDx3, SAB x2    Osteopenia 10/3/2007    Overactive bladder 3/28/2011    Postmenopausal     No HRT    Postmenopausal, LMP 38 yo, No HRT history 3/18/2010    Recurrent cystitis 2014    Right knee pain 3/24/2014    2014; CSI per Ortho Dr Samantha Villalba office    Right shoulder pain 3/8/2012    S/P abdominal hysterectomy and left salpingo-oophorectomy     Uterine Fibroids; no HRT    S/P colonoscopy with polypectomy     benign polyp, f/u 5yrs; Dr. Alonzo Rojas    S/P SCC excised from left hand     SCC (squamous cell carcinoma), hand -    Lt hand, SCC excised, Dr. Venecia Oseguera    SVT (supraventricular tachycardia) ([facility] 2019    Admitted to [facility] 18 for chest pressure and palpitations; medications adjusted, Cardio Dr Margot Barajas for Echo and stress test    TIA (transient ischemic attack) 2013      Past Surgical History:   Procedure Laterality Date    COLONOSCOPY  2006    q5yrs; Dr Alonzo Rojas; Diverticulosis, internal hemorrhoids, benign polyp    ECHO 2D ADULT      Normal    HM DEXA SCAN  10/2007    Osteopenia; spine -1.5, fem neck -1.1    HX CATARACT REMOVAL Bilateral 2/15/2018 and 2018    [facility]    HX COLONOSCOPY  2013    Diverticulosis only, f/u 10 yrs, Dr Haleigh Farfan Right     2 cyss removed from right middle finger    HX HYSTERECTOMY      USO; NO HRT    HX LAP CHOLECYSTECTOMY  1992    HX ORTHOPAEDIC      benign tumor removed from left wrist    HX ORTHOPAEDIC Right 16, Dr Scottie Pena right foot pin/plate    HX OTHER SURGICAL      Dermatology :  Removal a melamona left side of face     HX TONSILLECTOMY  1950    T&A    ND COLONOSCOPY FLX DX W/COLLJ SPEC WHEN PFRMD  05/2008    for rectal bleeding; hemorrhoids, diverticulae, small polyp; repeat 5 yrs    ND ECG ROUTINE ECG W/LEAST 12 LDS I&R ONLY  2010, 2009    Normal     Current Outpatient Medications   Medication Sig Dispense Refill    cranberry fruit concentrate (AZO CRANBERRY PO) Take  by mouth. Lactobacillus acidophilus (PROBIOTIC PO) Take  by mouth daily. multivit-min/iron/folic/lutein (CENTRUM SILVER WOMEN PO) Take  by mouth daily. mirabegron ER (Myrbetriq) 50 mg ER tablet Take 50 mg by mouth daily. metoprolol tartrate (LOPRESSOR) 25 mg tablet TAKE ONE TABLET BY MOUTH EVERY 12 HOURS 180 Tablet 0    simvastatin (ZOCOR) 10 mg tablet TAKE ONE TABLET BY MOUTH EVERY EVENING 90 Tablet 0    clopidogreL (PLAVIX) 75 mg tab TAKE ONE TABLET BY MOUTH ONE TIME DAILY 90 Tablet 0    Cetirizine (ZyrTEC) 10 mg cap Take  by mouth. famotidine (PEPCID) 40 mg tablet TAKE ONE TABLET BY MOUTH EVERY EVENING 90 Tablet 1    colestipoL (COLESTID) 1 gram tablet Take 1 Tablet by mouth two (2) times a day. 180 Tablet 0    losartan-hydroCHLOROthiazide (HYZAAR) 100-25 mg per tablet Take 1 Tab by mouth daily. Changed from plain Losartan 100 mg daily per cardio 12-25-18      dilTIAZem CD (CARDIZEM CD) 180 mg ER capsule Take 1 Cap by mouth daily. Started per cardio 12-25-18 for SVT      calcium-cholecalciferol, D3, (CALTRATE 600+D) tablet Take 1 Tab by mouth daily. acetaminophen (TYLENOL) 650 mg CR tablet Take 1,300 mg by mouth daily as needed for Pain.       EPIPEN 2-MONICA 0.3 mg/0.3 mL (1:1,000) injection USE IMMEDIATELY AS NEEDED FOR ALLERGIC REACTION TO BEE STING 2 Each 1     Allergies   Allergen Reactions    Oxycodone Hives, Nausea Only and Other (comments)    Ace Inhibitors Cough    Bee Sting [Sting, Bee] Swelling    Fish Oil Other (comments) GI upset    Lisinopril Cough    Motrin [Ibuprofen] Swelling and Myalgia    Oxycodone-Acetaminophen Nausea and Vomiting    Pcn [Penicillins] Swelling    Promethazine-Dm Other (comments)     HA, nightmares       Family History   Problem Relation Age of Onset    Thyroid Disease Mother         h/o Grave's dz;  age 79yo    Osteoporosis Mother     Delayed Awakening Mother     Stroke Father          age 80    Other Father         AAA age 76, macular degeneration    Alcohol abuse Father         smoker and drinker    Osteoporosis Sister     Other Brother         heart murmur    Liver Disease Brother         hepatitis c    Dementia Maternal Grandmother         dementia and mental illness    Melanoma Daughter 52    Other Son         gout    Gout Son     Melanoma Son 48    Anesth Problems Neg Hx      Social History     Tobacco Use    Smoking status: Never    Smokeless tobacco: Never   Substance Use Topics    Alcohol use:  Yes     Alcohol/week: 1.7 standard drinks     Types: 2 Glasses of wine per week     Comment: usually 2 glasses of wine per week but none at all since 19 due to GI issues         Celestine Longoria MD

## 2023-03-14 NOTE — PROGRESS NOTES
Chief Complaint   Patient presents with    Annual Wellness Visit    Diabetes     Fasting follow up    Cholesterol Problem    Hypertension       HISTORY OF PRESENT ILLNESS   HPI  Fasting follow up Type 2 NIDDM, Hypercholesterolemia, Hypertension, labs and medication check. Complying routinely w/ medication regimen updated below and tolerating w/o reaction or side effects. Does not check BS's at home. Checks home BP's almost daily and brought in a log of readings from 2/21/23-present. Readings are good in the 110's-130's/66-80's typically. And heart rates usually run in the 60's-80's. She feels well. She sees her cardiologist Dr. Rosenthal Loud at [address] annually in ~ October. Stable. No changes made at last visit. Diet: nothing special right now but still leans more toward Mediterranean Diet since 8-2021; not a lot of sweets  Caffeine: 1 cup of coffee qam, no tea, occ soda  Exercise: none since  due to knee issues, seeing Kita Shultz; but prior to that was walking almost daily x 30 minutes, active in her yard, enjoys the pool but cant very often  Weight: down from 255 lbs in 2019 to the 220's currently     REVIEW OF SYMPTOMS   Review of Systems   Constitutional: Negative. HENT: Negative. Eyes: Negative. Respiratory: Negative. Cardiovascular: Negative. Gastrointestinal: Negative. Musculoskeletal:  Negative for myalgias. Neurological: Negative. Endo/Heme/Allergies: Negative. Psychiatric/Behavioral: Negative.            PROBLEM LIST/MEDICAL HISTORY     Problem List  Date Reviewed: 3/14/2023            Codes Class Noted    History of supraventricular tachycardia ICD-10-CM: Z86.79  ICD-9-CM: V12.59  1/20/2019    Overview Signed 1/20/2019  1:08 PM by Natacha Bowman MD     Ad[facility] to [facility] 12[facility]25[facility]18 for chest pressure and palpitations; medications adjusted, Cardio Dr Raquel Lopez for Echo and stress test             Cataracts, bilateral ICD-10-CM: H26.9  ICD-9-CM: 366.9  2/3/2017 Overview Addendum 2017  8:32 AM by Amy Aparicio MD     R>L, Dr Tano Hopkins, changing to Dr Rubina Dalal [facility]              TIA (transient ischemic attack) ICD-10-CM: G45.9  ICD-9-CM: 435.9  2016    Overview Signed 2016 11:02 AM by Amy Aparicio MD     [address] 16-11/3/16, Neuro Dr Amor Dill             Diabetes mellitus type 2, noninsulin dependent ([facility] ICD-10-CM: E11.9  ICD-9-CM: 250.00  2016    Overview Addendum 3/14/2023  9:20 AM by Amy Aparicio MD     10/2014; Eye: [facility]             ACP (advance care planning) ICD-10-CM: Z71.89  ICD-9-CM: V65.49  2016    Overview Signed 2016  8:36 AM by Amy Aparicio MD     Patient has an AMD             Benign essential hypertension ICD-10-CM: I10  ICD-9-CM: 401.1  2016        Bee sting allergy with anaphylaxis reaction ICD-10-CM: Z91.030  ICD-9-CM: V15.06  2015        Arthritis of right foot & ankle ICD-10-CM: M19.071  ICD-9-CM: 716.97  2015    Overview Signed 2015 10:38 AM by Amy Aparicio MD     Ortho Dr Maldonado Manzano; used a boot x 2 months, off              Recurrent cystitis ICD-10-CM: N30.90  ICD-9-CM: 595.9  2014        Right knee pain ICD-10-CM: M25.561  ICD-9-CM: 719.46  3/24/2014    Overview Signed 3/24/2014 11:45 AM by Amy Aparicio MD     2014; CSI per Ortho Dr Criss Choi office             Hypercholesterolemia ICD-10-CM: E78.00  ICD-9-CM: 272.0  1/3/2014        Vertebrobasilar artery syndrome ICD-10-CM: G45.0  ICD-9-CM: 435.3  2013    Overview Addendum 2013 12:53 PM by Amy Aparicio MD     ER; Neuro Dr Gong Ply; dc'd on ASA, Zocor and Metoprolol             Overactive bladder ICD-10-CM: N32.81  ICD-9-CM: 596.51  3/28/2011    Overview Signed 2021 10:10 AM by Amy Aparicio MD     Urology Dr. Kathrine Webster              (normal spontaneous vaginal delivery) ICD-10-CM: O80  ICD-9-CM: 890 Unknown    Overview Signed 3/29/2010 12:48 PM by Kala Piper MD     A2, NSVDx3, SAB x2             S/P abdominal hysterectomy and left salpingo-oophorectomy ICD-10-CM: Z90.710, Z90.79, Z90.721  ICD-9-CM: V88.01  Unknown    Overview Signed 3/29/2010 12:49 PM by Kala Piper MD     Uterine Fibroids; no HRT             S/P SCC excised from left hand ICD-10-CM: C44.621  ICD-9-CM: 173.62  Unknown    Overview Signed 3/29/2010 12:54 PM by Kala Piper MD     Lt hand, SCC excised, Dr. Hanson Chanda             Postmenopausal, LMP 38 yo, No HRT history ICD-10-CM: Z78.0  ICD-9-CM: V49.81  3/18/2010        History of pneumonia ICD-10-CM: Z87.01  ICD-9-CM: V12.61  3/18/2010    Overview Signed 3/18/2010 10:22 AM by Onesimo Mejia     [id number]             Osteopenia ICD-10-CM: M85.80  ICD-9-CM: 733.90  10/3/2007               PAST SURGICAL HISTORY     Past Surgical History:   Procedure Laterality Date    COLONOSCOPY  2006    q5yrs; Dr Anish Deniseore; Diverticulosis, internal hemorrhoids, benign polyp    ECHO 2D ADULT      Normal    HM DEXA SCAN  10/2007    Osteopenia; spine -1.5, fem neck -1.1    HX CATARACT REMOVAL Bilateral 2/15/2018 and 2018    [facility]    HX COLONOSCOPY  2013    Diverticulosis only, f/u 10 yrs, Dr Kiera Loyd Right 2017    2 cyss removed from right middle finger    HX HYSTERECTOMY      USO; NO HRT    HX LAP CHOLECYSTECTOMY  1992    HX ORTHOPAEDIC      benign tumor removed from left wrist    HX ORTHOPAEDIC Right 16, Dr Woody Hawkins    right foot pin/plate    HX OTHER SURGICAL      Dermatology :  Removal a melamona left side of face     HX TONSILLECTOMY  1950    T&A    VT COLONOSCOPY FLX DX W/COLLJ SPEC WHEN PFRMD  2008    for rectal bleeding; hemorrhoids, diverticulae, small polyp; repeat 5 yrs    VT ECG ROUTINE ECG W/LEAST 12 LDS I&R ONLY  ,     Normal         MEDICATIONS     Current Outpatient Medications   Medication Sig cranberry fruit concentrate (AZO CRANBERRY PO) Take  by mouth. Lactobacillus acidophilus (PROBIOTIC PO) Take  by mouth daily. multivit-min/iron/folic/lutein (CENTRUM SILVER WOMEN PO) Take  by mouth daily. mirabegron ER (Myrbetriq) 50 mg ER tablet Take 50 mg by mouth daily. metoprolol tartrate (LOPRESSOR) 25 mg tablet TAKE ONE TABLET BY MOUTH EVERY 12 HOURS    simvastatin (ZOCOR) 10 mg tablet TAKE ONE TABLET BY MOUTH EVERY EVENING    clopidogreL (PLAVIX) 75 mg tab TAKE ONE TABLET BY MOUTH ONE TIME DAILY    Cetirizine (ZyrTEC) 10 mg cap Take  by mouth. famotidine (PEPCID) 40 mg tablet TAKE ONE TABLET BY MOUTH EVERY EVENING    colestipoL (COLESTID) 1 gram tablet Take 1 Tablet by mouth two (2) times a day. losartan-hydroCHLOROthiazide (HYZAAR) 100-25 mg per tablet Take 1 Tab by mouth daily. Changed from plain Losartan 100 mg daily per cardio 12-25-18    dilTIAZem CD (CARDIZEM CD) 180 mg ER capsule Take 1 Cap by mouth daily. Started per cardio 12-25-18 for SVT    calcium-cholecalciferol, D3, (CALTRATE 600+D) tablet Take 1 Tab by mouth daily. acetaminophen (TYLENOL) 650 mg CR tablet Take 1,300 mg by mouth daily as needed for Pain. EPIPEN 2-MONICA 0.3 mg/0.3 mL (1:1,000) injection USE IMMEDIATELY AS NEEDED FOR ALLERGIC REACTION TO BEE STING     No current facility-administered medications for this visit. ALLERGIES     Allergies   Allergen Reactions    Oxycodone Hives, Nausea Only and Other (comments)    Ace Inhibitors Cough    Bee Sting [Sting, Bee] Swelling    Fish Oil Other (comments)     GI upset    Lisinopril Cough    Motrin [Ibuprofen] Swelling and Myalgia    Oxycodone-Acetaminophen Nausea and Vomiting    Pcn [Penicillins] Swelling    Promethazine-Dm Other (comments)     HA, nightmares          SOCIAL HISTORY     Social History     Tobacco Use    Smoking status: Never    Smokeless tobacco: Never   Substance Use Topics    Alcohol use:  Yes     Alcohol/week: 1.7 standard drinks     Types: 2 Glasses of wine per week     Comment: usually 2 glasses of wine per week but none at all since 19 due to GI issues     Social History     Social History Narrative    , 3 children    Retired  provider    Patient has an AMD    Diet: nothing special right now but still leans more toward Mediterranean Diet since ; not a lot of sweets    Caffeine: 1 cup of coffee qam, no tea, occ soda    Exercise: none since  due to knee issues, seeing Kita Green; but prior to that was walking almost daily x 30 minutes, active in her yard, enjoys the pool but cant very often    Weight: down from 255 lbs in 2019 to the 220's currently         Social History     Substance and Sexual Activity   Sexual Activity Yes    Partners: Male       IMMUNIZATIONS     Immunization History   Administered Date(s) Administered    (RETIRED) Pneumococcal Vaccine (Unspecified Type) 2009    COVID-19, MODERNA BLUE border, Primary or Immunocompromised, (age 18y+), IM, 100 mcg/0.5mL 02/15/2021, 03/15/2021, 2021, 2022, 2022    Influenza High Dose Vaccine PF 2016, 2017, 10/30/2019, 2021, 10/17/2022    Influenza Vaccine 2015    Influenza Vaccine (Tri) Adjuvanted (>65 Yrs FLUAD TRI [id number]) 2018, 10/30/2019    Influenza Vaccine Split 10/01/2012    Influenza, FLUAD, (age 72 y+), Adjuvanted 2020    Pneumococcal Conjugate (PCV-13) 2018    Pneumococcal Polysaccharide (PPSV-23) 2014, 02/10/2020    TD Vaccine 1999    TDAP Vaccine 2009    Td, Adsorbed PF 2015    Varicella Virus Vaccine Live 10/01/2011    Zoster 2011    Zoster Recombinant 05/10/2018, 2018         FAMILY HISTORY     Family History   Problem Relation Age of Onset    Thyroid Disease Mother         h/o Grave's dz;  age 79yo    Osteoporosis Mother     Delayed Awakening Mother     Stroke Father          age 80    Other Father         AAA age 76, macular degeneration Alcohol abuse Father         smoker and drinker    Osteoporosis Sister     Other Brother         heart murmur    Liver Disease Brother         hepatitis c    Dementia Maternal Grandmother         dementia and mental illness    Melanoma Daughter 52    Other Son         gout    Gout Son     Melanoma Son 48    Anesth Problems Neg Hx          VITALS   Visit Vitals  /74 (BP 1 Location: Left upper arm, BP Patient Position: Sitting, BP Cuff Size: Adult long)   Pulse 69   Temp 98 °F (36.7 °C) (Oral)   Resp 16   Ht 5' 5\" (1.651 m)   Wt 225 lb 9.6 oz (102.3 kg)   SpO2 96%   BMI 37.54 kg/m²          PHYSICAL EXAMINATION   Physical Exam  Vitals reviewed. Constitutional:       General: She is not in acute distress. HENT:      Right Ear: Tympanic membrane normal.      Left Ear: Tympanic membrane normal.      Mouth/Throat:      Mouth: Mucous membranes are moist.      Pharynx: Oropharynx is clear. Eyes:      Conjunctiva/sclera: Conjunctivae normal.   Neck:      Thyroid: No thyroid mass, thyromegaly or thyroid tenderness. Vascular: No carotid bruit. Cardiovascular:      Rate and Rhythm: Normal rate and regular rhythm. Heart sounds: Normal heart sounds. Pulmonary:      Effort: Pulmonary effort is normal.      Breath sounds: Normal breath sounds. Abdominal:      Palpations: Abdomen is soft. Tenderness: There is no abdominal tenderness. Musculoskeletal:         General: No swelling or tenderness. Cervical back: Neck supple. Feet:      Comments: Declines diabetic foot exam  Lymphadenopathy:      Cervical: No cervical adenopathy. Skin:     General: Skin is warm and dry. Neurological:      General: No focal deficit present. Mental Status: She is alert and oriented to person, place, and time. Mental status is at baseline. Motor: Motor function is intact. Coordination: Coordination is intact.    Psychiatric:         Mood and Affect: Mood and affect normal.         Cognition and Memory: Cognition and memory normal.             ASSESSMENT & PLAN   Diagnoses and all orders for this visit:    1. Medicare annual wellness visit, subsequent  See separate note under this same encounter visit for Medicare Wellness note. Age/risk based screening recommendations, health maintenance & prevention counseling. Cancer screening USPTFS guidelines reviewed w/ pt today. Discussed benefits/positive/negative outcomes of screening based on age/risk stratification. Informed consent for/against screening based on pt's personal hx/risk factors. Updated in history above and health maintenance. 2. Diabetes mellitus type 2, noninsulin dependent ([address]  At diabetic goal for age. Controlled w/ diet. -     METABOLIC PANEL, COMPREHENSIVE; Future  -     LIPID PANEL; Future  -     HEMOGLOBIN A1C WITH EAG; Future  -     MICROALBUMIN, UR, RAND W/ MICROALB/CREAT RATIO; Future  -     HM DIABETES EYE EXAM: Annually at [address] [place] [address]: Declined by pt. 3. Hypercholesterolemia  Maintained on statin therapy. Also on Colestid for GI. Continue Zocor 10 mg daily for now. Fasting labs checked today  -     METABOLIC PANEL, COMPREHENSIVE; Future  -     LIPID PANEL; Future  -     TSH 3RD GENERATION; Future    4. Benign essential hypertension  Controlled, stable. Continue current regimen as updated in med review above  -     CBC W/O DIFF; Future  -     METABOLIC PANEL, COMPREHENSIVE; Future  -     LIPID PANEL; Future  -     TSH 3RD GENERATION; Future    5. BMI 37.0-37.9, adult  -     LIPID PANEL; Future  -     TSH 3RD GENERATION; Future    6. History of supraventricular tachycardia  Followed annually by cardiologist Dr. Lorenzo Sterling, last visit ~ , stable, no changes    7. TIA (transient ischemic attack)  2016, without recurrence. Stable on regimen of Plavix, statin therapy and anti-hypertensive regimen.       Fasting labs checked today  Reviewed diet, nutrition, exercise, weight management, BMI/goals. Reviewed medications and side effects. Doing well on current regimen  Further follow up & other recommendations pending review of labs.

## 2023-03-14 NOTE — PATIENT INSTRUCTIONS
Medicare Wellness Visit, Female     The best way to live healthy is to have a lifestyle where you eat a well-balanced diet, exercise regularly, limit alcohol use, and quit all forms of tobacco/nicotine, if applicable. Regular preventive services are another way to keep healthy. Preventive services (vaccines, screening tests, monitoring & exams) can help personalize your care plan, which helps you manage your own care. Screening tests can find health problems at the earliest stages, when they are easiest to treat. [facility] follows the current, evidence-based guidelines published by the [facility] Kee Mata ([facility]) when recommending preventive services for our patients. Because we follow these guidelines, sometimes recommendations change over time as research supports it. (For example, mammograms used to be recommended annually. Even though Medicare will still pay for an annual mammogram, the newer guidelines recommend a mammogram every two years for women of average risk). Of course, you and your doctor may decide to screen more often for some diseases, based on your risk and your co-morbidities (chronic disease you are already diagnosed with). Preventive services for you include:  - Medicare offers their members a free annual wellness visit, which is time for you and your primary care provider to discuss and plan for your preventive service needs.  Take advantage of this benefit every year!    -Over the age of 72 should receive the recommended pneumonia vaccines.    -All adults should have a flu vaccine yearly.  -All adults should have a tetanus vaccine every 10 years.   -Over the age 48 should receive the shingles vaccines.        -All adults should be screened once for Hepatitis C.  -All adults age 38-68 who are overweight should have a diabetes screening test once every three years.   -Other screening tests and preventive services for persons with diabetes include: an eye exam to screen for diabetic retinopathy, a kidney function test, a foot exam, and stricter control over your cholesterol.   -Cardiovascular screening for adults with routine risk involves an electrocardiogram (ECG) at intervals determined by your doctor.     -Colorectal cancer screenings should be done for adults age 39-70 with no increased risk factors for colorectal cancer. There are a number of acceptable methods of screening for this type of cancer. Each test has its own benefits and drawbacks. Discuss with your doctor what is most appropriate for you during your annual wellness visit. The different tests include: colonoscopy (considered the best screening method), a fecal occult blood test, a fecal DNA test, and sigmoidoscopy.    -Lung cancer screening is recommended annually with a low dose CT scan for adults between age 54 and 68, who have smoked at least 30 pack years (equivalent of 1 pack per day for 30 days), and who is a current smoker or quit less than 15 years ago.    -A bone mass density test is recommended when a woman turns 65 to screen for osteoporosis. This test is only recommended one time, as a screening. Some providers will use this same test as a disease monitoring tool if you already have osteoporosis. -Breast cancer screenings are recommended every other year for women of normal risk, age 54-69.    -Cervical cancer screenings for women over age 72 are only recommended with certain risk factors.      Here is a list of your current Health Maintenance items (your personalized list of preventive services) with a due date:  Health Maintenance Due   Topic Date Due    Eye Exam  02/03/2022    Hemoglobin A1C    06/08/2022    Diabetic Foot Care  12/08/2022    Cholesterol Test   12/08/2022

## 2023-03-14 NOTE — PROGRESS NOTES
Chief Complaint   Patient presents with    Annual Wellness Visit    Cholesterol Problem     fasting    Diabetes    Hypertension     1. \"Have you been to the ER, urgent care clinic since your last visit? Hospitalized since your last visit? \" No    2. \"Have you seen or consulted any other health care providers outside of the [address] [place] [address] since your last visit? \" Ortho Dr Trey Azar, urology Dr Doe Polk    3. For patients aged 39-70: Has the patient had a colonoscopy / FIT/ Cologuard? Yes - no Care Gap present 2013 Diverticulosis only, f/u 10 yrs, Dr Britni Pichardo      If the patient is female:    4. For patients aged 41-77: Has the patient had a mammogram within the past 2 years? Yes - no Care Gap present 9/2021      5. For patients aged 21-65: Has the patient had a pap smear?  Yes - no Care Gap present

## 2023-03-15 LAB
ALBUMIN SERPL-MCNC: 3.7 G/DL (ref 3.5–5)
ALBUMIN/GLOB SERPL: 1.2 (ref 1.1–2.2)
ALP SERPL-CCNC: 104 U/L (ref 45–117)
ALT SERPL-CCNC: 18 U/L (ref 12–78)
ANION GAP SERPL CALC-SCNC: 3 MMOL/L (ref 5–15)
AST SERPL-CCNC: 15 U/L (ref 15–37)
BILIRUB SERPL-MCNC: 0.7 MG/DL (ref 0.2–1)
BUN SERPL-MCNC: 25 MG/DL (ref 6–20)
BUN/CREAT SERPL: 40 (ref 12–20)
CALCIUM SERPL-MCNC: 9.6 MG/DL (ref 8.5–10.1)
CHLORIDE SERPL-SCNC: 105 MMOL/L (ref 97–108)
CHOLEST SERPL-MCNC: 140 MG/DL
CO2 SERPL-SCNC: 32 MMOL/L (ref 21–32)
CREAT SERPL-MCNC: 0.63 MG/DL (ref 0.55–1.02)
CREAT UR-MCNC: 55.7 MG/DL
ERYTHROCYTE [DISTWIDTH] IN BLOOD BY AUTOMATED COUNT: 14.7 % (ref 11.5–14.5)
EST. AVERAGE GLUCOSE BLD GHB EST-MCNC: 128 MG/DL
GLOBULIN SER CALC-MCNC: 3.1 G/DL (ref 2–4)
GLUCOSE SERPL-MCNC: 112 MG/DL (ref 65–100)
HBA1C MFR BLD: 6.1 % (ref 4–5.6)
HCT VFR BLD AUTO: 42.6 % (ref 35–47)
HDLC SERPL-MCNC: 51 MG/DL
HDLC SERPL: 2.7 (ref 0–5)
HGB BLD-MCNC: 12.9 G/DL (ref 11.5–16)
LDLC SERPL CALC-MCNC: 63.8 MG/DL (ref 0–100)
MCH RBC QN AUTO: 28.8 PG (ref 26–34)
MCHC RBC AUTO-ENTMCNC: 30.3 G/DL (ref 30–36.5)
MCV RBC AUTO: 95.1 FL (ref 80–99)
MICROALBUMIN UR-MCNC: 0.74 MG/DL
MICROALBUMIN/CREAT UR-RTO: 13 MG/G (ref 0–30)
NRBC # BLD: 0 K/UL (ref 0–0.01)
NRBC BLD-RTO: 0 PER 100 WBC
PLATELET # BLD AUTO: 277 K/UL (ref 150–400)
PMV BLD AUTO: 10.6 FL (ref 8.9–12.9)
POTASSIUM SERPL-SCNC: 4.2 MMOL/L (ref 3.5–5.1)
PROT SERPL-MCNC: 6.8 G/DL (ref 6.4–8.2)
RBC # BLD AUTO: 4.48 M/UL (ref 3.8–5.2)
SODIUM SERPL-SCNC: 140 MMOL/L (ref 136–145)
TRIGL SERPL-MCNC: 126 MG/DL (ref ?–150)
TSH SERPL DL<=0.05 MIU/L-ACNC: 1.13 UIU/ML (ref 0.36–3.74)
VLDLC SERPL CALC-MCNC: 25.2 MG/DL
WBC # BLD AUTO: 8.1 K/UL (ref 3.6–11)

## 2023-03-22 DIAGNOSIS — K21.9 GASTROESOPHAGEAL REFLUX DISEASE, UNSPECIFIED WHETHER ESOPHAGITIS PRESENT: ICD-10-CM

## 2023-03-23 PROBLEM — K21.9 GERD (GASTROESOPHAGEAL REFLUX DISEASE): Status: ACTIVE | Noted: 2023-03-23

## 2023-03-23 RX ORDER — FAMOTIDINE 40 MG/1
TABLET, FILM COATED ORAL
Qty: 90 TABLET | Refills: 3 | Status: SHIPPED | OUTPATIENT
Start: 2023-03-23

## 2023-04-24 DIAGNOSIS — E11.9 DIABETES MELLITUS TYPE 2, NONINSULIN DEPENDENT (HCC): ICD-10-CM

## 2023-04-24 DIAGNOSIS — G45.9 TIA (TRANSIENT ISCHEMIC ATTACK): ICD-10-CM

## 2023-04-24 DIAGNOSIS — I10 BENIGN ESSENTIAL HYPERTENSION: ICD-10-CM

## 2023-04-24 DIAGNOSIS — E78.00 HYPERCHOLESTEROLEMIA: ICD-10-CM

## 2023-04-24 RX ORDER — CLOPIDOGREL BISULFATE 75 MG/1
TABLET ORAL
Qty: 90 TABLET | Refills: 3 | Status: SHIPPED | OUTPATIENT
Start: 2023-04-24

## 2023-04-24 RX ORDER — SIMVASTATIN 10 MG/1
TABLET, FILM COATED ORAL
Qty: 90 TABLET | Refills: 3 | Status: SHIPPED | OUTPATIENT
Start: 2023-04-24

## 2023-04-24 RX ORDER — METOPROLOL TARTRATE 25 MG/1
TABLET, FILM COATED ORAL
Qty: 180 TABLET | Refills: 3 | Status: SHIPPED | OUTPATIENT
Start: 2023-04-24

## 2023-12-01 ENCOUNTER — TELEPHONE (OUTPATIENT)
Age: 73
End: 2023-12-01

## 2023-12-01 NOTE — TELEPHONE ENCOUNTER
Roverto Marquez for her to take the Paxlovid. However she would need to hold the Zocor for 12 hrs prior to starting the Paxlovid and for 5 days after completing the Paxlovid. Also need to confirm pharmacy. Let me know.

## 2023-12-01 NOTE — TELEPHONE ENCOUNTER
Also let pt know to hold the zocor while taking the paxlovid and for 5 days after. Pt states understanding.

## 2023-12-01 NOTE — PROGRESS NOTES
I am ok with sending in the Paxlovid. There was no pharmacy pended/selected and the only ones she has listed are [facility] and [facility]. So I linked it to the [employer]. Also if she takes the Paxlovid she has to hold her Zocor for 12 hrs prior to starting the Paxlovid and for 5 days after completing the Paxlovid. Confirm pharmacy and let me know.

## 2023-12-01 NOTE — PROGRESS NOTES
Called pt and went over Dr Radha Smith recommendations, like tylenol for fever, body aches, headaches, sore throat etc.  For cough mucinex 12 hour and to take it twice a day. Nasal congestion use saline nasal spray. Sore throat use warm salt water gargles. Immune support she recommends taking Vit D 1000 units, Vit C 1000 mg and zinc 50 mg every day. You can take that for several weeks. ER for onset of chest pain, shortness of breath, wheezing or any other acute changes or other significant concerns.,   You don't need to retest, it isn't uncommon to test positive for a while. Just follow CDC guidelines. Wear a mask when you are out for 10 days. I have put in the [facility] pharmacy for the paxlovid to be sent in.

## 2024-01-22 ENCOUNTER — TELEPHONE (OUTPATIENT)
Age: 74
End: 2024-01-22

## 2024-01-22 NOTE — TELEPHONE ENCOUNTER
Spoke to pt and she is scheduled for surgery on Fri 1/26 for perm placements of the electrodes in her back to stop the her bladder spasms.  She is having general anesthesia.   She has been to the hospital for preop, she has talked to the nurse there.  It was recommended by them to hold the plavix for 5 days prior to surgery (along with some of her other supplement).   They wanted PCP to see if ok to hold for 5 days.  She hasn't heard anyone say anything about a clearance.  I called [facility] but they were closed already.  I called pt back to let her know that she will need to call them tomorrow to ask them IF they need medical clearance.    She said that plavix wasn't an issue until she told them that she fell and had some bruising.    I called pt to see if she was able to get a hold of Dr Agustin's office.   She said that she was able to talk to the surgical team and was told that the surgery is a go.  They don't need anything from Dr Wu.

## 2024-01-22 NOTE — TELEPHONE ENCOUNTER
Patient sent a message on [employer] to nurse and seen her response, and wanted to speak with nurse. Patient says she needs clearance from PCP stating she goods to be off plavix for her upcoming surgery.(Has to be off the medication for at least 5 days prior to surgery)   Patient stated her bruise from her fall seems to be better, she's not experiencing any pain.  Patient best call back number before 6 is:   And the best call back number after 6 is: [phone]

## 2024-04-17 ENCOUNTER — TELEPHONE (OUTPATIENT)
Age: 74
End: 2024-04-17

## 2024-04-19 ENCOUNTER — TELEPHONE (OUTPATIENT)
Age: 74
End: 2024-04-19

## 2024-04-19 RX ORDER — CLOPIDOGREL BISULFATE 75 MG/1
TABLET ORAL
Qty: 90 TABLET | Refills: 3 | OUTPATIENT
Start: 2024-04-19

## 2024-04-19 RX ORDER — SIMVASTATIN 10 MG
TABLET ORAL
Qty: 90 TABLET | Refills: 3 | OUTPATIENT
Start: 2024-04-19

## 2024-04-19 NOTE — TELEPHONE ENCOUNTER
Patient last wellness visit and labs 3-2023. Not scheduled again now until ! Needs sooner appt. Getting 90 day supply of meds. I can send in 30 day supply to hold. Please advise and route back.

## 2024-04-19 NOTE — TELEPHONE ENCOUNTER
I have left 2 VM and sent her a [employer] message letting her know of need to get her scheduled for a sooner appt.    I was able to schedule pt for a fasting AWV for next week.  Pt has enough medications to cover her.

## 2024-04-22 ENCOUNTER — OFFICE VISIT (OUTPATIENT)
Age: 74
End: 2024-04-22
Payer: MEDICARE

## 2024-04-22 VITALS
TEMPERATURE: 98 F | SYSTOLIC BLOOD PRESSURE: 138 MMHG | OXYGEN SATURATION: 97 % | WEIGHT: 226.6 LBS | RESPIRATION RATE: 16 BRPM | BODY MASS INDEX: 38.68 KG/M2 | DIASTOLIC BLOOD PRESSURE: 76 MMHG | HEART RATE: 70 BPM | HEIGHT: 64 IN

## 2024-04-22 DIAGNOSIS — Z86.79 HISTORY OF SUPRAVENTRICULAR TACHYCARDIA: ICD-10-CM

## 2024-04-22 DIAGNOSIS — F43.21 ADJUSTMENT DISORDER WITH DEPRESSED MOOD: ICD-10-CM

## 2024-04-22 DIAGNOSIS — G45.9 TIA (TRANSIENT ISCHEMIC ATTACK): ICD-10-CM

## 2024-04-22 DIAGNOSIS — I10 BENIGN ESSENTIAL HYPERTENSION: ICD-10-CM

## 2024-04-22 DIAGNOSIS — E78.00 HYPERCHOLESTEROLEMIA: ICD-10-CM

## 2024-04-22 DIAGNOSIS — E11.9 DIABETES MELLITUS TYPE 2, NONINSULIN DEPENDENT (HCC): Primary | ICD-10-CM

## 2024-04-22 PROCEDURE — 1090F PRES/ABSN URINE INCON ASSESS: CPT | Performed by: FAMILY MEDICINE

## 2024-04-22 PROCEDURE — G8399 PT W/DXA RESULTS DOCUMENT: HCPCS | Performed by: FAMILY MEDICINE

## 2024-04-22 PROCEDURE — 3075F SYST BP GE 130 - 139MM HG: CPT | Performed by: FAMILY MEDICINE

## 2024-04-22 PROCEDURE — G8417 CALC BMI ABV UP PARAM F/U: HCPCS | Performed by: FAMILY MEDICINE

## 2024-04-22 PROCEDURE — 99215 OFFICE O/P EST HI 40 MIN: CPT | Performed by: FAMILY MEDICINE

## 2024-04-22 PROCEDURE — G8427 DOCREV CUR MEDS BY ELIG CLIN: HCPCS | Performed by: FAMILY MEDICINE

## 2024-04-22 PROCEDURE — 1036F TOBACCO NON-USER: CPT | Performed by: FAMILY MEDICINE

## 2024-04-22 PROCEDURE — 3078F DIAST BP <80 MM HG: CPT | Performed by: FAMILY MEDICINE

## 2024-04-22 PROCEDURE — 1123F ACP DISCUSS/DSCN MKR DOCD: CPT | Performed by: FAMILY MEDICINE

## 2024-04-22 RX ORDER — ACETAMINOPHEN/DIPHENHYDRAMINE 500MG-25MG
1 TABLET ORAL NIGHTLY PRN
COMMUNITY

## 2024-04-22 RX ORDER — METHENAMINE HIPPURATE 1000 MG/1
1 TABLET ORAL 2 TIMES DAILY WITH MEALS
COMMUNITY

## 2024-04-22 SDOH — ECONOMIC STABILITY: FOOD INSECURITY: WITHIN THE PAST 12 MONTHS, YOU WORRIED THAT YOUR FOOD WOULD RUN OUT BEFORE YOU GOT MONEY TO BUY MORE.: NEVER TRUE

## 2024-04-22 SDOH — ECONOMIC STABILITY: HOUSING INSECURITY
IN THE LAST 12 MONTHS, WAS THERE A TIME WHEN YOU DID NOT HAVE A STEADY PLACE TO SLEEP OR SLEPT IN A SHELTER (INCLUDING NOW)?: NO

## 2024-04-22 SDOH — ECONOMIC STABILITY: FOOD INSECURITY: WITHIN THE PAST 12 MONTHS, THE FOOD YOU BOUGHT JUST DIDN'T LAST AND YOU DIDN'T HAVE MONEY TO GET MORE.: NEVER TRUE

## 2024-04-22 SDOH — ECONOMIC STABILITY: INCOME INSECURITY: HOW HARD IS IT FOR YOU TO PAY FOR THE VERY BASICS LIKE FOOD, HOUSING, MEDICAL CARE, AND HEATING?: NOT HARD AT ALL

## 2024-04-22 ASSESSMENT — ENCOUNTER SYMPTOMS
GASTROINTESTINAL NEGATIVE: 1
RESPIRATORY NEGATIVE: 1

## 2024-04-22 ASSESSMENT — PATIENT HEALTH QUESTIONNAIRE - PHQ9
SUM OF ALL RESPONSES TO PHQ QUESTIONS 1-9: 10
SUM OF ALL RESPONSES TO PHQ QUESTIONS 1-9: 10
3. TROUBLE FALLING OR STAYING ASLEEP: NEARLY EVERY DAY
10. IF YOU CHECKED OFF ANY PROBLEMS, HOW DIFFICULT HAVE THESE PROBLEMS MADE IT FOR YOU TO DO YOUR WORK, TAKE CARE OF THINGS AT HOME, OR GET ALONG WITH OTHER PEOPLE: NOT DIFFICULT AT ALL
6. FEELING BAD ABOUT YOURSELF - OR THAT YOU ARE A FAILURE OR HAVE LET YOURSELF OR YOUR FAMILY DOWN: NOT AT ALL
8. MOVING OR SPEAKING SO SLOWLY THAT OTHER PEOPLE COULD HAVE NOTICED. OR THE OPPOSITE, BEING SO FIGETY OR RESTLESS THAT YOU HAVE BEEN MOVING AROUND A LOT MORE THAN USUAL: NOT AT ALL
9. THOUGHTS THAT YOU WOULD BE BETTER OFF DEAD, OR OF HURTING YOURSELF: NOT AT ALL
SUM OF ALL RESPONSES TO PHQ9 QUESTIONS 1 & 2: 3
4. FEELING TIRED OR HAVING LITTLE ENERGY: SEVERAL DAYS
SUM OF ALL RESPONSES TO PHQ QUESTIONS 1-9: 10
7. TROUBLE CONCENTRATING ON THINGS, SUCH AS READING THE NEWSPAPER OR WATCHING TELEVISION: SEVERAL DAYS
1. LITTLE INTEREST OR PLEASURE IN DOING THINGS: MORE THAN HALF THE DAYS
SUM OF ALL RESPONSES TO PHQ QUESTIONS 1-9: 10
2. FEELING DOWN, DEPRESSED OR HOPELESS: SEVERAL DAYS
5. POOR APPETITE OR OVEREATING: MORE THAN HALF THE DAYS

## 2024-04-22 NOTE — PROGRESS NOTES
Chief Complaint   Patient presents with    Cholesterol Problem     Not fasting    Diabetes    Hypertension    Depression     1. \"Have you been to the ER, urgent care clinic since your last visit?  Hospitalized since your last visit?\" [facility]    2. \"Have you seen or consulted any other health care providers outside of the [facility] since your last visit?\" Urologist Dr Agustin, and cardio Dr Casillas, GI Dr Peck    3. For patients aged 45-75: Has the patient had a colonoscopy / FIT/ Cologuard? Yes - no Care Gap present 9/2013 Diverticulosis only, f/u 10 yrs, Dr Balwinder Drake       If the patient is female:    4. For patients aged 40-74: Has the patient had a mammogram within the past 2 years? Yes - no Care Gap present4/2023      5. For patients aged 21-65: Has the patient had a pap smear? NA - based on age or sex

## 2024-04-22 NOTE — PROGRESS NOTES
Chief Complaint   Patient presents with    Cholesterol Problem     Not fasting    Diabetes    Hypertension    Depression     HISTORY OF PRESENT ILLNESS   HPI  Patient with history of Type 2 NIDDM, Hypercholesterolemia, Hypertension presents for routine follow up, medication check and due labs but not fasting today. Complying routinely w/ medication regimen updated below and tolerating w/o reaction or side effects. Not checking home BS's. No signs/symptoms of hyper or hypoglycemia. On automated BP monitor program through her cardiologist's office.   Diet: nothing special right now but still leans more toward Mediterranean Diet since 8-2021; not a lot of sweets    Caffeine: 1 cup of coffee qam, no tea, occ soda    Exercise: none since ~ 5-2023 and has lost motivation to do so; used to walk almost daily x 30 minutes, tries to stay active in her yard & around her home, enjoys the pool but doesn't go, [employer] membership but doesn't go  Weight: down from 255 lbs in 2019 to the 220's and maintaining in that range the past few years     Patient has been feeling sad and depressed since the passing of her  last year. It is approaching the 1 yr anniversary of his death and she states she is really struggling. She states she never believed she would be in the state that she is in and she just can't seem to pull herself out of these lows. She has very supportive friends, family, community but sometimes just doesn't want to be bothered. Everyone keeps her active, engaged, on the go but sometimes it wears her out and she just wants to be alone which is so unlike her. She prefers to be avoidant at times. She lacks motivation to do things she usually enjoys. Her appetite fluctuates either one extreme or the other. Sometimes she is so nauseated she can't eat anything and drastically loses weight. Other times she resorts to junk food/sweets/chocolate as comfort food and regains all the weight back. Falls asleep ok at night but

## 2024-04-29 DIAGNOSIS — E78.00 HYPERCHOLESTEROLEMIA: ICD-10-CM

## 2024-04-29 DIAGNOSIS — E11.9 DIABETES MELLITUS TYPE 2, NONINSULIN DEPENDENT (HCC): ICD-10-CM

## 2024-04-29 DIAGNOSIS — I10 BENIGN ESSENTIAL HYPERTENSION: ICD-10-CM

## 2024-04-29 LAB
ALBUMIN SERPL-MCNC: 3.5 G/DL (ref 3.5–5)
ALBUMIN/GLOB SERPL: 1.1 (ref 1.1–2.2)
ALP SERPL-CCNC: 110 U/L (ref 45–117)
ALT SERPL-CCNC: 18 U/L (ref 12–78)
ANION GAP SERPL CALC-SCNC: 6 MMOL/L (ref 5–15)
AST SERPL-CCNC: 16 U/L (ref 15–37)
BILIRUB SERPL-MCNC: 0.8 MG/DL (ref 0.2–1)
BUN SERPL-MCNC: 19 MG/DL (ref 6–20)
BUN/CREAT SERPL: 28 (ref 12–20)
CALCIUM SERPL-MCNC: 9.4 MG/DL (ref 8.5–10.1)
CHLORIDE SERPL-SCNC: 104 MMOL/L (ref 97–108)
CHOLEST SERPL-MCNC: 124 MG/DL
CO2 SERPL-SCNC: 30 MMOL/L (ref 21–32)
CREAT SERPL-MCNC: 0.68 MG/DL (ref 0.55–1.02)
CREAT UR-MCNC: 102 MG/DL
ERYTHROCYTE [DISTWIDTH] IN BLOOD BY AUTOMATED COUNT: 14.5 % (ref 11.5–14.5)
EST. AVERAGE GLUCOSE BLD GHB EST-MCNC: 128 MG/DL
GLOBULIN SER CALC-MCNC: 3.1 G/DL (ref 2–4)
GLUCOSE SERPL-MCNC: 113 MG/DL (ref 65–100)
HBA1C MFR BLD: 6.1 % (ref 4–5.6)
HCT VFR BLD AUTO: 40.1 % (ref 35–47)
HDLC SERPL-MCNC: 47 MG/DL
HDLC SERPL: 2.6 (ref 0–5)
HGB BLD-MCNC: 13.1 G/DL (ref 11.5–16)
LDLC SERPL CALC-MCNC: 55.6 MG/DL (ref 0–100)
MCH RBC QN AUTO: 29.1 PG (ref 26–34)
MCHC RBC AUTO-ENTMCNC: 32.7 G/DL (ref 30–36.5)
MCV RBC AUTO: 89.1 FL (ref 80–99)
MICROALBUMIN UR-MCNC: 1.57 MG/DL
MICROALBUMIN/CREAT UR-RTO: 15 MG/G (ref 0–30)
NRBC # BLD: 0 K/UL (ref 0–0.01)
NRBC BLD-RTO: 0 PER 100 WBC
PLATELET # BLD AUTO: 226 K/UL (ref 150–400)
PMV BLD AUTO: 10.3 FL (ref 8.9–12.9)
POTASSIUM SERPL-SCNC: 3.8 MMOL/L (ref 3.5–5.1)
PROT SERPL-MCNC: 6.6 G/DL (ref 6.4–8.2)
RBC # BLD AUTO: 4.5 M/UL (ref 3.8–5.2)
SODIUM SERPL-SCNC: 140 MMOL/L (ref 136–145)
TRIGL SERPL-MCNC: 107 MG/DL
TSH SERPL DL<=0.05 MIU/L-ACNC: 1.03 UIU/ML (ref 0.36–3.74)
VLDLC SERPL CALC-MCNC: 21.4 MG/DL
WBC # BLD AUTO: 7.4 K/UL (ref 3.6–11)

## 2024-05-11 RX ORDER — SIMVASTATIN 10 MG
TABLET ORAL
Qty: 90 TABLET | Refills: 3 | Status: SHIPPED | OUTPATIENT
Start: 2024-05-11

## 2024-06-12 SDOH — HEALTH STABILITY: PHYSICAL HEALTH: ON AVERAGE, HOW MANY DAYS PER WEEK DO YOU ENGAGE IN MODERATE TO STRENUOUS EXERCISE (LIKE A BRISK WALK)?: 3 DAYS

## 2024-06-12 SDOH — HEALTH STABILITY: PHYSICAL HEALTH: ON AVERAGE, HOW MANY MINUTES DO YOU ENGAGE IN EXERCISE AT THIS LEVEL?: 30 MIN

## 2024-06-12 ASSESSMENT — PATIENT HEALTH QUESTIONNAIRE - PHQ9
2. FEELING DOWN, DEPRESSED OR HOPELESS: SEVERAL DAYS
SUM OF ALL RESPONSES TO PHQ9 QUESTIONS 1 & 2: 1
1. LITTLE INTEREST OR PLEASURE IN DOING THINGS: NOT AT ALL
SUM OF ALL RESPONSES TO PHQ QUESTIONS 1-9: 1

## 2024-06-12 ASSESSMENT — LIFESTYLE VARIABLES
HOW OFTEN DO YOU HAVE A DRINK CONTAINING ALCOHOL: MONTHLY OR LESS
HOW MANY STANDARD DRINKS CONTAINING ALCOHOL DO YOU HAVE ON A TYPICAL DAY: 1
HOW OFTEN DO YOU HAVE SIX OR MORE DRINKS ON ONE OCCASION: 1
HOW MANY STANDARD DRINKS CONTAINING ALCOHOL DO YOU HAVE ON A TYPICAL DAY: 1 OR 2
HOW OFTEN DO YOU HAVE A DRINK CONTAINING ALCOHOL: 2

## 2024-06-13 ENCOUNTER — OFFICE VISIT (OUTPATIENT)
Age: 74
End: 2024-06-13
Payer: MEDICARE

## 2024-06-13 VITALS
TEMPERATURE: 98.5 F | HEIGHT: 64 IN | OXYGEN SATURATION: 97 % | DIASTOLIC BLOOD PRESSURE: 70 MMHG | BODY MASS INDEX: 38.48 KG/M2 | WEIGHT: 225.4 LBS | SYSTOLIC BLOOD PRESSURE: 132 MMHG | HEART RATE: 88 BPM | RESPIRATION RATE: 16 BRPM

## 2024-06-13 DIAGNOSIS — F43.21 ADJUSTMENT DISORDER WITH DEPRESSED MOOD: ICD-10-CM

## 2024-06-13 DIAGNOSIS — N32.81 OVERACTIVE BLADDER: ICD-10-CM

## 2024-06-13 DIAGNOSIS — Z00.00 MEDICARE ANNUAL WELLNESS VISIT, SUBSEQUENT: Primary | ICD-10-CM

## 2024-06-13 DIAGNOSIS — R35.0 URINARY FREQUENCY: ICD-10-CM

## 2024-06-13 DIAGNOSIS — K21.9 GASTROESOPHAGEAL REFLUX DISEASE WITHOUT ESOPHAGITIS: Primary | ICD-10-CM

## 2024-06-13 LAB
APPEARANCE UR: CLEAR
BACTERIA URNS QL MICRO: NEGATIVE /HPF
BILIRUB UR QL: NEGATIVE
COLOR UR: NORMAL
EPITH CASTS URNS QL MICRO: NORMAL /LPF
GLUCOSE UR STRIP.AUTO-MCNC: NEGATIVE MG/DL
HGB UR QL STRIP: NEGATIVE
HYALINE CASTS URNS QL MICRO: NORMAL /LPF (ref 0–5)
KETONES UR QL STRIP.AUTO: NEGATIVE MG/DL
LEUKOCYTE ESTERASE UR QL STRIP.AUTO: NEGATIVE
NITRITE UR QL STRIP.AUTO: NEGATIVE
PH UR STRIP: 5 (ref 5–8)
PROT UR STRIP-MCNC: NEGATIVE MG/DL
RBC #/AREA URNS HPF: NORMAL /HPF (ref 0–5)
SP GR UR REFRACTOMETRY: 1.03 (ref 1–1.03)
URINE CULTURE IF INDICATED: NORMAL
UROBILINOGEN UR QL STRIP.AUTO: 0.2 EU/DL (ref 0.2–1)
WBC URNS QL MICRO: NORMAL /HPF (ref 0–4)

## 2024-06-13 PROCEDURE — 1123F ACP DISCUSS/DSCN MKR DOCD: CPT | Performed by: FAMILY MEDICINE

## 2024-06-13 PROCEDURE — G8399 PT W/DXA RESULTS DOCUMENT: HCPCS | Performed by: FAMILY MEDICINE

## 2024-06-13 PROCEDURE — 3075F SYST BP GE 130 - 139MM HG: CPT | Performed by: FAMILY MEDICINE

## 2024-06-13 PROCEDURE — 1036F TOBACCO NON-USER: CPT | Performed by: FAMILY MEDICINE

## 2024-06-13 PROCEDURE — 3078F DIAST BP <80 MM HG: CPT | Performed by: FAMILY MEDICINE

## 2024-06-13 PROCEDURE — G8427 DOCREV CUR MEDS BY ELIG CLIN: HCPCS | Performed by: FAMILY MEDICINE

## 2024-06-13 PROCEDURE — G0439 PPPS, SUBSEQ VISIT: HCPCS | Performed by: FAMILY MEDICINE

## 2024-06-13 PROCEDURE — 99213 OFFICE O/P EST LOW 20 MIN: CPT | Performed by: FAMILY MEDICINE

## 2024-06-13 PROCEDURE — G8417 CALC BMI ABV UP PARAM F/U: HCPCS | Performed by: FAMILY MEDICINE

## 2024-06-13 PROCEDURE — 1090F PRES/ABSN URINE INCON ASSESS: CPT | Performed by: FAMILY MEDICINE

## 2024-06-13 RX ORDER — FAMOTIDINE 40 MG/1
40 TABLET, FILM COATED ORAL NIGHTLY PRN
Qty: 90 TABLET | Refills: 3 | Status: SHIPPED | OUTPATIENT
Start: 2024-06-13

## 2024-06-13 RX ORDER — FAMOTIDINE 40 MG/1
40 TABLET, FILM COATED ORAL NIGHTLY
Qty: 90 TABLET | Refills: 1 | OUTPATIENT
Start: 2024-06-13

## 2024-06-13 ASSESSMENT — ENCOUNTER SYMPTOMS
RESPIRATORY NEGATIVE: 1
GASTROINTESTINAL NEGATIVE: 1

## 2024-06-13 NOTE — TELEPHONE ENCOUNTER
Last appointment: 4/22/24 MD PINA  Next appointment: 10/16/24 MD PINA  Previous refill encounter(s): 4/12/23 90 + 1    Requested Prescriptions     Pending Prescriptions Disp Refills    famotidine (PEPCID) 40 MG tablet 90 tablet 1     Sig: Take 1 tablet by mouth nightly     For Pharmacy Admin Tracking Only    Program: Medication Refill  CPA in place:    Recommendation Provided To:   Intervention Detail: New Rx: 1, reason: Patient Preference  Intervention Accepted By:   Gap Closed?:    Time Spent (min): 5

## 2024-06-13 NOTE — PROGRESS NOTES
Chief Complaint   Patient presents with    Medicare AWV         Medication Check     1. \"Have you been to the ER, urgent care clinic since your last visit?  Hospitalized since your last visit?\" No    2. \"Have you seen or consulted any other health care providers outside of the [facility] since your last visit?\" No    3. For patients aged 45-75: Has the patient had a colonoscopy / FIT/ Cologuard? Cologuard recently negative    If the patient is female:    4. For patients aged 40-74: Has the patient had a mammogram within the past 2 years? Yes - no Care Gap present 4/2023      5. For patients aged 21-65: Has the patient had a pap smear? NA - based on age or sex

## 2024-06-13 NOTE — PROGRESS NOTES
Medicare Annual Wellness Visit    Vianey Hanson is here for Medicare AWV (), Follow Up Depression, and Medication Check      Refer to separate note in this encounter for follow up of patient's chronic conditions, disease management, problems addressed today, and/or other health concerns as noted.     Assessment & Plan   Medicare annual wellness visit, subsequent  Recommendations for Preventive Services Due: see orders and patient instructions/AVS.  Recommended screening schedule for the next 5-10 years is provided to the patient in written form: see Patient Instructions/AVS.              Patient's complete Health Risk Assessment and screening values have been reviewed and are found in Flowsheets. The following problems were reviewed today and where indicated follow up appointments were made and/or referrals ordered.    Positive Risk Factor Screenings with Interventions:       Cognitive:   Clock Drawing Test (CDT): (!) Abnormal  Words recalled: 3 Words Recalled  Total Score: 3  Total Score Interpretation: Normal Mini-Cog  Interventions:  See AVS for additional education material            Activity, Diet, and Weight:  On average, how many days per week do you engage in moderate to strenuous exercise (like a brisk walk)?: 3 days  On average, how many minutes do you engage in exercise at this level?: 30 min    Do you eat balanced/healthy meals regularly?: Yes    Body mass index is 39.3 kg/m². (!) Abnormal    Obesity Interventions:  See AVS for additional education material               Safety:  Do you have non-slip mats or non-slip surfaces or shower bars or grab bars in your shower or bathtub?: (!) No  Interventions:  See AVS for additional education material                           CareTeam (Including outside providers/suppliers regularly involved in providing care):   Patient Care Team:  Shelby Dunn MD as PCP - General  Shelby Dunn MD as PCP - Empaneled Provider     Reviewed and

## 2024-06-13 NOTE — PATIENT INSTRUCTIONS
10,000 steps per day on a pedometer .  Order or download the FREE \"Exercise & Physical Activity: Your Everyday Guide\" from [employer]. Call 1-109.665.6429 or search [employer] online.  You need 6526-8496 mg of calcium and 7642-6745 IU of vitamin D per day. It is possible to meet your calcium requirement with diet alone, but a vitamin D supplement is usually necessary to meet this goal.  When exposed to the sun, use a sunscreen that protects against both UVA and UVB radiation with an SPF of 30 or greater. Reapply every 2 to 3 hours or after sweating, drying off with a towel, or swimming.  Always wear a seat belt when traveling in a car. Always wear a helmet when riding a bicycle or motorcycle.

## 2024-06-13 NOTE — PROGRESS NOTES
Chief Complaint   Patient presents with    Medicare AWV         Follow Up Depression    Medication Check     HISTORY OF PRESENT ILLNESS   HPI  Patient presents for 6 week follow up depression and medication evaluation after starting Zoloft at her last visit 4-22-24. She started off taking 1/2 of the 50 mg tablet x 1 week then increased to the full tablet and has tolerated it well w/o reaction or side effects. She states that within about 10-14 days she began to feel dramatically better. She feels almost 100% completely back to \"normal\". She had a \"bad day\" of feeling down, sad and weepy on their wedding anniversary day last month, had a rough/restless night, but the next AM was feeling better again. She feels the medication is making a significant positive impact and she is very pleased w/ her progress thus far. She is feeling more upbeat, motivated, encouraged. She is getting out more again. She is back to doing the things that she enjoys such as gardening, painting and walking for exercise. She has enjoyed going to the pool w/ her grandson. Local family all very supportive and she enjoys spending time regularly w/ them. She feels she is resting better and can even tell when looking in the mirror that her eyes look brighter. She wishes to stay the course w/ the current regimen of Zoloft 50 mg qam.    She has h/o OAB and recurrent UTI's followed by Urology. Her symptoms have been well controlled and much improved overall since having urologic procedure in the past and being maintained on Myrbetriq. She has not had a UTI in a long time. The past 2 days she has been having some slight increased urinary frequency but no other symptoms but wants to check urine while she is here to make sure no infection. Denies pressure, urgency, pain, burning, blood, incontinence. In the past for UTI's she has taken Cipro w/ good results but hasn't needed it for some time now. If so, she usually requires Diflucan afterwards for

## 2024-06-17 DIAGNOSIS — F43.21 ADJUSTMENT DISORDER WITH DEPRESSED MOOD: ICD-10-CM

## 2024-06-20 DIAGNOSIS — K21.9 GASTROESOPHAGEAL REFLUX DISEASE WITHOUT ESOPHAGITIS: ICD-10-CM

## 2024-06-20 NOTE — TELEPHONE ENCOUNTER
Rx sent on 6/13/24 for 90 + 1 refill.      Refused this request per duplicate request.  Thanks, Meeta    For Pharmacy Admin Tracking Only    Program: Medication Refill  CPA in place:    Recommendation Provided To:   Intervention Detail: Discontinued Rx: 1, reason: Duplicate Therapy  Intervention Accepted By:   Gap Closed?:    Time Spent (min): 5

## 2024-06-21 RX ORDER — FAMOTIDINE 40 MG/1
40 TABLET, FILM COATED ORAL NIGHTLY
Qty: 90 TABLET | Refills: 1 | OUTPATIENT
Start: 2024-06-21

## 2024-06-21 NOTE — TELEPHONE ENCOUNTER
Spoke to Leda.  Patient received refill of famotidine from rx sent on 6/13/24.  Sent additional request by accident.  ThanksMeeta    For Pharmacy Admin Tracking Only    Program: Medication Refill  CPA in place:    Recommendation Provided To:   Intervention Detail: Discontinued Rx: 1, reason: Duplicate Therapy  Intervention Accepted By:   Gap Closed?:    Time Spent (min): 5

## 2024-06-28 NOTE — TELEPHONE ENCOUNTER
PCP: Shelby Dunn MD    LOV: 6.13.24    No future appointments.    Requested Prescriptions     Pending Prescriptions Disp Refills    clopidogrel (PLAVIX) 75 MG tablet [Pharmacy Med Name: CLOPIDOGREL 75 MG TAB[*]] 90 tablet 3     Sig: TAKE ONE TABLET BY MOUTH ONE TIME DAILY       Prior labs and Blood pressures:  BP Readings from Last 3 Encounters:   06/13/24 132/70   04/22/24 138/76   03/14/23 122/74     Lab Results   Component Value Date/Time     04/29/2024 10:10 AM    K 3.8 04/29/2024 10:10 AM     04/29/2024 10:10 AM    CO2 30 04/29/2024 10:10 AM    BUN 19 04/29/2024 10:10 AM    GFRAA >60 12/08/2021 10:52 AM     No results found for: \"HBA1C\", \"[id number]\"  Lab Results   Component Value Date/Time    CHOL 124 04/29/2024 10:10 AM    HDL 47 04/29/2024 10:10 AM    LDL 55.6 04/29/2024 10:10 AM    VLDL 21.4 04/29/2024 10:10 AM     No results found for: \"VITD3\"    Lab Results   Component Value Date/Time    TSH 1.13 03/14/2023 09:55 AM

## 2024-06-30 RX ORDER — CLOPIDOGREL BISULFATE 75 MG/1
TABLET ORAL
Qty: 90 TABLET | Refills: 3 | Status: SHIPPED | OUTPATIENT
Start: 2024-06-30

## 2024-08-16 ENCOUNTER — TELEPHONE (OUTPATIENT)
Age: 74
End: 2024-08-16

## 2024-08-16 NOTE — TELEPHONE ENCOUNTER
Called pt to see how she was doing.  She said that she ended up going back to ER b/c she wasn't getting better.  She had told them the first time she went to the ER that she had something similar happen to her a year ago and she had been @ [facility] ED.  They couldn't find any record of that visit.  Pt was able to find the visit in her records and when she went back to ER she took it with her.  They started her on ABX, some nausea medication.      She is feeling better today.  She has been eating and keeping it down.  She is pushing fluids.  She is scheduled to see NP @ Dr Casillas's office on Monday.  She is going to put a call out to [facility] with the GI that she had seen last year.    I did go over that mychart isn't for urgent needs.  She said that  the  and Dr Casillas's office didn't think that her low BP was an urgent issue so she was just letting us know what was going on.  I did ask her that if she is just keeping us posted that she put that in the note.  I did let her know that I would have recommended that she go back to the ER like she did.

## 2024-08-28 LAB
ALBUMIN SERPL-MCNC: 3.2 G/DL (ref 3.5–5)
ALBUMIN/GLOB SERPL: 1.1 (ref 1.1–2.2)
ALP SERPL-CCNC: 117 U/L (ref 45–117)
ALT SERPL-CCNC: 14 U/L (ref 12–78)
ANION GAP SERPL CALC-SCNC: 2 MMOL/L (ref 5–15)
AST SERPL-CCNC: 11 U/L (ref 15–37)
BILIRUB SERPL-MCNC: 0.7 MG/DL (ref 0.2–1)
BUN SERPL-MCNC: 15 MG/DL (ref 6–20)
BUN/CREAT SERPL: 23 (ref 12–20)
CALCIUM SERPL-MCNC: 8.8 MG/DL (ref 8.5–10.1)
CHLORIDE SERPL-SCNC: 108 MMOL/L (ref 97–108)
CO2 SERPL-SCNC: 30 MMOL/L (ref 21–32)
CREAT SERPL-MCNC: 0.64 MG/DL (ref 0.55–1.02)
ERYTHROCYTE [DISTWIDTH] IN BLOOD BY AUTOMATED COUNT: 14.6 % (ref 11.5–14.5)
GLOBULIN SER CALC-MCNC: 3 G/DL (ref 2–4)
GLUCOSE SERPL-MCNC: 113 MG/DL (ref 65–100)
HCT VFR BLD AUTO: 38.3 % (ref 35–47)
HGB BLD-MCNC: 12.2 G/DL (ref 11.5–16)
MCH RBC QN AUTO: 28.6 PG (ref 26–34)
MCHC RBC AUTO-ENTMCNC: 31.9 G/DL (ref 30–36.5)
MCV RBC AUTO: 89.9 FL (ref 80–99)
NRBC # BLD: 0 K/UL (ref 0–0.01)
NRBC BLD-RTO: 0 PER 100 WBC
PLATELET # BLD AUTO: 267 K/UL (ref 150–400)
PMV BLD AUTO: 10.9 FL (ref 8.9–12.9)
POTASSIUM SERPL-SCNC: 3.9 MMOL/L (ref 3.5–5.1)
PROT SERPL-MCNC: 6.2 G/DL (ref 6.4–8.2)
RBC # BLD AUTO: 4.26 M/UL (ref 3.8–5.2)
SODIUM SERPL-SCNC: 140 MMOL/L (ref 136–145)
WBC # BLD AUTO: 6.6 K/UL (ref 3.6–11)

## 2024-08-31 LAB
ADV 40+41 DNA STL QL NAA+NON-PROBE: NOT DETECTED
ASTRO TYP 1-8 RNA STL QL NAA+NON-PROBE: NOT DETECTED
C CAYETANENSIS DNA STL QL NAA+NON-PROBE: NOT DETECTED
C COLI+JEJ+UPSA DNA STL QL NAA+NON-PROBE: NOT DETECTED
C DIF TOX TCDA+TCDB STL QL NAA+NON-PROBE: NOT DETECTED
CRYPTOSP DNA STL QL NAA+NON-PROBE: NOT DETECTED
E COLI O157 DNA STL QL NAA+NON-PROBE: ABNORMAL
E HISTOLYT DNA STL QL NAA+NON-PROBE: NOT DETECTED
EAEC PAA PLAS AGGR+AATA ST NAA+NON-PRB: NOT DETECTED
EC STX1+STX2 GENES STL QL NAA+NON-PROBE: NOT DETECTED
EPEC EAE GENE STL QL NAA+NON-PROBE: NOT DETECTED
ETEC LTA+ST1A+ST1B TOX ST NAA+NON-PROBE: DETECTED
G LAMBLIA DNA STL QL NAA+NON-PROBE: NOT DETECTED
NOROVIRUS GI+II RNA STL QL NAA+NON-PROBE: NOT DETECTED
P SHIGELLOIDES DNA STL QL NAA+NON-PROBE: NOT DETECTED
RVA RNA STL QL NAA+NON-PROBE: NOT DETECTED
S ENT+BONG DNA STL QL NAA+NON-PROBE: NOT DETECTED
SAPO I+II+IV+V RNA STL QL NAA+NON-PROBE: NOT DETECTED
SHIGELLA SP+EIEC IPAH ST NAA+NON-PROBE: NOT DETECTED
SPECIMEN SOURCE: ABNORMAL
V CHOL+PARA+VUL DNA STL QL NAA+NON-PROBE: NOT DETECTED
V CHOLERAE DNA STL QL NAA+NON-PROBE: NOT DETECTED
Y ENTEROCOL DNA STL QL NAA+NON-PROBE: NOT DETECTED

## 2024-09-01 LAB
CALPROTECTIN STL-MCNT: 121 UG/G (ref 0–120)
Lab: NORMAL
Lab: NORMAL
REFERENCE LAB: NORMAL

## 2024-09-29 ENCOUNTER — APPOINTMENT (OUTPATIENT)
Facility: HOSPITAL | Age: 74
DRG: 392 | End: 2024-09-29
Payer: MEDICARE

## 2024-09-29 ENCOUNTER — HOSPITAL ENCOUNTER (INPATIENT)
Facility: HOSPITAL | Age: 74
LOS: 2 days | Discharge: HOME | DRG: 392 | End: 2024-10-01
Attending: EMERGENCY MEDICINE | Admitting: HOSPITALIST
Payer: MEDICARE

## 2024-09-29 DIAGNOSIS — E86.0 DEHYDRATION: ICD-10-CM

## 2024-09-29 DIAGNOSIS — N17.9 AKI (ACUTE KIDNEY INJURY) (HCC): ICD-10-CM

## 2024-09-29 DIAGNOSIS — R19.7 DIARRHEA, UNSPECIFIED TYPE: Primary | ICD-10-CM

## 2024-09-29 LAB
ALBUMIN SERPL-MCNC: 3.3 G/DL (ref 3.5–5)
ALBUMIN/GLOB SERPL: 0.8 (ref 1.1–2.2)
ALP SERPL-CCNC: 111 U/L (ref 45–117)
ALT SERPL-CCNC: 26 U/L (ref 12–78)
ANION GAP SERPL CALC-SCNC: 12 MMOL/L (ref 2–12)
AST SERPL-CCNC: 24 U/L (ref 15–37)
BASOPHILS # BLD: 0 K/UL (ref 0–0.1)
BASOPHILS NFR BLD: 1 % (ref 0–1)
BILIRUB SERPL-MCNC: 1.1 MG/DL (ref 0.2–1)
BUN SERPL-MCNC: 26 MG/DL (ref 6–20)
BUN/CREAT SERPL: 17 (ref 12–20)
CALCIUM SERPL-MCNC: 9.6 MG/DL (ref 8.5–10.1)
CHLORIDE SERPL-SCNC: 97 MMOL/L (ref 97–108)
CO2 SERPL-SCNC: 26 MMOL/L (ref 21–32)
COMMENT:: NORMAL
CREAT SERPL-MCNC: 1.55 MG/DL (ref 0.55–1.02)
DIFFERENTIAL METHOD BLD: NORMAL
EOSINOPHIL # BLD: 0 K/UL (ref 0–0.4)
EOSINOPHIL NFR BLD: 0 % (ref 0–7)
ERYTHROCYTE [DISTWIDTH] IN BLOOD BY AUTOMATED COUNT: 14.5 % (ref 11.5–14.5)
GLOBULIN SER CALC-MCNC: 4.2 G/DL (ref 2–4)
GLUCOSE SERPL-MCNC: 115 MG/DL (ref 65–100)
HCT VFR BLD AUTO: 41.1 % (ref 35–47)
HGB BLD-MCNC: 13.3 G/DL (ref 11.5–16)
IMM GRANULOCYTES # BLD AUTO: 0 K/UL (ref 0–0.04)
IMM GRANULOCYTES NFR BLD AUTO: 0 % (ref 0–0.5)
LIPASE SERPL-CCNC: 13 U/L (ref 13–75)
LYMPHOCYTES # BLD: 0.8 K/UL (ref 0.8–3.5)
LYMPHOCYTES NFR BLD: 13 % (ref 12–49)
MCH RBC QN AUTO: 28.2 PG (ref 26–34)
MCHC RBC AUTO-ENTMCNC: 32.4 G/DL (ref 30–36.5)
MCV RBC AUTO: 87.1 FL (ref 80–99)
MONOCYTES # BLD: 0.7 K/UL (ref 0–1)
MONOCYTES NFR BLD: 11 % (ref 5–13)
NEUTS SEG # BLD: 4.5 K/UL (ref 1.8–8)
NEUTS SEG NFR BLD: 75 % (ref 32–75)
NRBC # BLD: 0 K/UL (ref 0–0.01)
NRBC BLD-RTO: 0 PER 100 WBC
PLATELET # BLD AUTO: 234 K/UL (ref 150–400)
PMV BLD AUTO: 10.2 FL (ref 8.9–12.9)
POTASSIUM SERPL-SCNC: 3.3 MMOL/L (ref 3.5–5.1)
PROT SERPL-MCNC: 7.5 G/DL (ref 6.4–8.2)
RBC # BLD AUTO: 4.72 M/UL (ref 3.8–5.2)
SODIUM SERPL-SCNC: 135 MMOL/L (ref 136–145)
SPECIMEN HOLD: NORMAL
WBC # BLD AUTO: 6.1 K/UL (ref 3.6–11)

## 2024-09-29 PROCEDURE — 87209 SMEAR COMPLEX STAIN: CPT

## 2024-09-29 PROCEDURE — 83690 ASSAY OF LIPASE: CPT

## 2024-09-29 PROCEDURE — 80053 COMPREHEN METABOLIC PANEL: CPT

## 2024-09-29 PROCEDURE — 2580000003 HC RX 258: Performed by: EMERGENCY MEDICINE

## 2024-09-29 PROCEDURE — 6360000002 HC RX W HCPCS: Performed by: EMERGENCY MEDICINE

## 2024-09-29 PROCEDURE — 87177 OVA AND PARASITES SMEARS: CPT

## 2024-09-29 PROCEDURE — 6360000002 HC RX W HCPCS: Performed by: HOSPITALIST

## 2024-09-29 PROCEDURE — 1100000000 HC RM PRIVATE

## 2024-09-29 PROCEDURE — 87449 NOS EACH ORGANISM AG IA: CPT

## 2024-09-29 PROCEDURE — 85025 COMPLETE CBC W/AUTO DIFF WBC: CPT

## 2024-09-29 PROCEDURE — 2580000003 HC RX 258: Performed by: HOSPITALIST

## 2024-09-29 PROCEDURE — 74176 CT ABD & PELVIS W/O CONTRAST: CPT

## 2024-09-29 PROCEDURE — 96361 HYDRATE IV INFUSION ADD-ON: CPT

## 2024-09-29 PROCEDURE — 36415 COLL VENOUS BLD VENIPUNCTURE: CPT

## 2024-09-29 PROCEDURE — 6370000000 HC RX 637 (ALT 250 FOR IP): Performed by: EMERGENCY MEDICINE

## 2024-09-29 PROCEDURE — 96374 THER/PROPH/DIAG INJ IV PUSH: CPT

## 2024-09-29 PROCEDURE — 87506 IADNA-DNA/RNA PROBE TQ 6-11: CPT

## 2024-09-29 PROCEDURE — 6370000000 HC RX 637 (ALT 250 FOR IP): Performed by: HOSPITALIST

## 2024-09-29 PROCEDURE — 99285 EMERGENCY DEPT VISIT HI MDM: CPT

## 2024-09-29 PROCEDURE — 87324 CLOSTRIDIUM AG IA: CPT

## 2024-09-29 PROCEDURE — 83993 ASSAY FOR CALPROTECTIN FECAL: CPT

## 2024-09-29 RX ORDER — POTASSIUM CHLORIDE 7.45 MG/ML
10 INJECTION INTRAVENOUS PRN
Status: DISCONTINUED | OUTPATIENT
Start: 2024-09-29 | End: 2024-10-01 | Stop reason: HOSPADM

## 2024-09-29 RX ORDER — SODIUM CHLORIDE 9 MG/ML
INJECTION, SOLUTION INTRAVENOUS CONTINUOUS
Status: DISCONTINUED | OUTPATIENT
Start: 2024-09-29 | End: 2024-09-29

## 2024-09-29 RX ORDER — ONDANSETRON 2 MG/ML
4 INJECTION INTRAMUSCULAR; INTRAVENOUS EVERY 6 HOURS PRN
Status: DISCONTINUED | OUTPATIENT
Start: 2024-09-29 | End: 2024-10-01 | Stop reason: HOSPADM

## 2024-09-29 RX ORDER — FAMOTIDINE 20 MG/1
40 TABLET, FILM COATED ORAL NIGHTLY PRN
Status: DISCONTINUED | OUTPATIENT
Start: 2024-09-29 | End: 2024-10-01 | Stop reason: HOSPADM

## 2024-09-29 RX ORDER — POTASSIUM CHLORIDE 750 MG/1
40 TABLET, EXTENDED RELEASE ORAL PRN
Status: DISCONTINUED | OUTPATIENT
Start: 2024-09-29 | End: 2024-10-01 | Stop reason: HOSPADM

## 2024-09-29 RX ORDER — METOPROLOL TARTRATE 25 MG/1
25 TABLET, FILM COATED ORAL EVERY 12 HOURS
Status: DISCONTINUED | OUTPATIENT
Start: 2024-09-30 | End: 2024-10-01 | Stop reason: HOSPADM

## 2024-09-29 RX ORDER — SODIUM CHLORIDE AND POTASSIUM CHLORIDE 300; 900 MG/100ML; MG/100ML
INJECTION, SOLUTION INTRAVENOUS CONTINUOUS
Status: DISCONTINUED | OUTPATIENT
Start: 2024-09-29 | End: 2024-10-01 | Stop reason: HOSPADM

## 2024-09-29 RX ORDER — CLOPIDOGREL BISULFATE 75 MG/1
75 TABLET ORAL DAILY
Status: DISCONTINUED | OUTPATIENT
Start: 2024-09-30 | End: 2024-10-01 | Stop reason: HOSPADM

## 2024-09-29 RX ORDER — SODIUM CHLORIDE 0.9 % (FLUSH) 0.9 %
5-40 SYRINGE (ML) INJECTION EVERY 12 HOURS SCHEDULED
Status: DISCONTINUED | OUTPATIENT
Start: 2024-09-29 | End: 2024-10-01 | Stop reason: HOSPADM

## 2024-09-29 RX ORDER — IOPAMIDOL 755 MG/ML
100 INJECTION, SOLUTION INTRAVASCULAR
Status: DISCONTINUED | OUTPATIENT
Start: 2024-09-29 | End: 2024-10-01 | Stop reason: HOSPADM

## 2024-09-29 RX ORDER — TRAZODONE HYDROCHLORIDE 50 MG/1
50 TABLET, FILM COATED ORAL NIGHTLY PRN
Status: DISCONTINUED | OUTPATIENT
Start: 2024-09-29 | End: 2024-10-01 | Stop reason: HOSPADM

## 2024-09-29 RX ORDER — SODIUM CHLORIDE 9 MG/ML
INJECTION, SOLUTION INTRAVENOUS PRN
Status: DISCONTINUED | OUTPATIENT
Start: 2024-09-29 | End: 2024-10-01 | Stop reason: HOSPADM

## 2024-09-29 RX ORDER — MAGNESIUM SULFATE IN WATER 40 MG/ML
2000 INJECTION, SOLUTION INTRAVENOUS PRN
Status: DISCONTINUED | OUTPATIENT
Start: 2024-09-29 | End: 2024-10-01 | Stop reason: HOSPADM

## 2024-09-29 RX ORDER — ACETAMINOPHEN 650 MG/1
650 SUPPOSITORY RECTAL EVERY 6 HOURS PRN
Status: DISCONTINUED | OUTPATIENT
Start: 2024-09-29 | End: 2024-10-01 | Stop reason: HOSPADM

## 2024-09-29 RX ORDER — LACTOBACILLUS RHAMNOSUS GG 10B CELL
1 CAPSULE ORAL
Status: DISCONTINUED | OUTPATIENT
Start: 2024-09-30 | End: 2024-10-01 | Stop reason: HOSPADM

## 2024-09-29 RX ORDER — ATORVASTATIN CALCIUM 10 MG/1
10 TABLET, FILM COATED ORAL DAILY
Status: DISCONTINUED | OUTPATIENT
Start: 2024-09-30 | End: 2024-10-01 | Stop reason: HOSPADM

## 2024-09-29 RX ORDER — METHENAMINE HIPPURATE 1000 MG/1
1 TABLET ORAL 2 TIMES DAILY WITH MEALS
Status: DISCONTINUED | OUTPATIENT
Start: 2024-09-29 | End: 2024-10-01 | Stop reason: HOSPADM

## 2024-09-29 RX ORDER — SODIUM CHLORIDE 0.9 % (FLUSH) 0.9 %
5-40 SYRINGE (ML) INJECTION PRN
Status: DISCONTINUED | OUTPATIENT
Start: 2024-09-29 | End: 2024-10-01 | Stop reason: HOSPADM

## 2024-09-29 RX ORDER — ONDANSETRON 4 MG/1
4 TABLET, ORALLY DISINTEGRATING ORAL EVERY 8 HOURS PRN
Status: DISCONTINUED | OUTPATIENT
Start: 2024-09-29 | End: 2024-10-01 | Stop reason: HOSPADM

## 2024-09-29 RX ORDER — POTASSIUM CHLORIDE 750 MG/1
40 TABLET, EXTENDED RELEASE ORAL ONCE
Status: COMPLETED | OUTPATIENT
Start: 2024-09-29 | End: 2024-09-29

## 2024-09-29 RX ORDER — 0.9 % SODIUM CHLORIDE 0.9 %
2000 INTRAVENOUS SOLUTION INTRAVENOUS ONCE
Status: COMPLETED | OUTPATIENT
Start: 2024-09-29 | End: 2024-09-29

## 2024-09-29 RX ORDER — TROSPIUM CHLORIDE 20 MG/1
20 TABLET, FILM COATED ORAL
Status: DISCONTINUED | OUTPATIENT
Start: 2024-09-30 | End: 2024-10-01 | Stop reason: HOSPADM

## 2024-09-29 RX ORDER — ONDANSETRON 2 MG/ML
4 INJECTION INTRAMUSCULAR; INTRAVENOUS
Status: COMPLETED | OUTPATIENT
Start: 2024-09-29 | End: 2024-09-29

## 2024-09-29 RX ORDER — ACETAMINOPHEN 325 MG/1
650 TABLET ORAL EVERY 6 HOURS PRN
Status: DISCONTINUED | OUTPATIENT
Start: 2024-09-29 | End: 2024-10-01 | Stop reason: HOSPADM

## 2024-09-29 RX ORDER — ENOXAPARIN SODIUM 100 MG/ML
40 INJECTION SUBCUTANEOUS DAILY
Status: DISCONTINUED | OUTPATIENT
Start: 2024-09-30 | End: 2024-10-01 | Stop reason: HOSPADM

## 2024-09-29 RX ORDER — POLYETHYLENE GLYCOL 3350 17 G/17G
17 POWDER, FOR SOLUTION ORAL DAILY PRN
Status: DISCONTINUED | OUTPATIENT
Start: 2024-09-29 | End: 2024-10-01 | Stop reason: HOSPADM

## 2024-09-29 RX ADMIN — POTASSIUM CHLORIDE AND SODIUM CHLORIDE: 900; 300 INJECTION, SOLUTION INTRAVENOUS at 20:20

## 2024-09-29 RX ADMIN — SODIUM CHLORIDE 2000 ML: 9 INJECTION, SOLUTION INTRAVENOUS at 13:24

## 2024-09-29 RX ADMIN — ONDANSETRON 4 MG: 4 TABLET, ORALLY DISINTEGRATING ORAL at 20:26

## 2024-09-29 RX ADMIN — TRAZODONE HYDROCHLORIDE 50 MG: 50 TABLET ORAL at 20:22

## 2024-09-29 RX ADMIN — METHENAMINE HIPPURATE 1 G: 1 TABLET ORAL at 20:23

## 2024-09-29 RX ADMIN — POTASSIUM CHLORIDE 40 MEQ: 750 TABLET, EXTENDED RELEASE ORAL at 15:41

## 2024-09-29 RX ADMIN — ONDANSETRON 4 MG: 2 INJECTION INTRAMUSCULAR; INTRAVENOUS at 13:27

## 2024-09-29 RX ADMIN — SODIUM CHLORIDE: 9 INJECTION, SOLUTION INTRAVENOUS at 17:15

## 2024-09-29 ASSESSMENT — PAIN - FUNCTIONAL ASSESSMENT: PAIN_FUNCTIONAL_ASSESSMENT: 0-10

## 2024-09-29 ASSESSMENT — PAIN DESCRIPTION - ORIENTATION: ORIENTATION: LOWER

## 2024-09-29 ASSESSMENT — PAIN SCALES - GENERAL: PAINLEVEL_OUTOF10: 2

## 2024-09-29 ASSESSMENT — PAIN DESCRIPTION - LOCATION: LOCATION: BACK

## 2024-09-29 NOTE — ED TRIAGE NOTES
Pt arrives with reports of intermittent nausea, vomiting and diarrhea with lower back pain since 9/24/24. Pt reports she was out of town when symptoms began. Symptoms have since come and gone. Today she reports 4-5 episodes of diarrhea and 4-5 episodes of vomiting. Pt reports approx 6 weeks ago she was diagnosed with E.Coli infection and has since finished a course of cipro. Pt denies abd pain, fevers.

## 2024-09-29 NOTE — ED NOTES
Report called PADMINI Royal at [facility]. H2H at bedside; paperwork and report given to crew. Patient aware and agreeable to transfer.

## 2024-09-29 NOTE — H&P
auscultation bilaterally.  No wheezes, rales or rhonchi.    CVS: Regular rate and rhythm.  No murmurs, gallops or rubs.  No peripheral edema.   Pulses 2+, symmetric in all extremities.    Abd: Soft, non-tender, non-distended.  Bowel sounds positive.  No masses, hepatosplenomegaly or hernias.    Musculoskeletal/Ext: Full range of motion in all extremities.  No joint swelling, tenderness or deformity.  No clubbing, no cyanosis, no edema    Neuro/Psych: Pleasant mood and affect.  Alert and oriented to place, person, time and situation (A and O x 4).  Cranial nerves II-XII are intact.  Motor exam strength 5/5 in upper and lower extremities bilaterally.  Sensory, intact to light touch, pain and temperature.  Gait: Steady and coordinated    Skin: Warm, dry, without rashes or lesions    Data Review:   I have independently reviewed and interpreted patient's lab and all other diagnostic data    Abnormal Labs Reviewed   COMPREHENSIVE METABOLIC PANEL - Abnormal; Notable for the following components:       Result Value    Sodium 135 (*)     Potassium 3.3 (*)     Glucose 115 (*)     BUN 26 (*)     Creatinine 1.55 (*)     Est, Glom Filt Rate 35 (*)     Total Bilirubin 1.1 (*)     Albumin 3.3 (*)     Globulin 4.2 (*)     Albumin/Globulin Ratio 0.8 (*)     All other components within normal limits       [unfilled]    IMAGING:   CT ABDOMEN PELVIS WO CONTRAST Additional Contrast? None   Final Result   Sigmoid diverticulosis. No acute abnormality. Status post cholecystectomy and   hysterectomy. 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      Electronically signed by SIMBA PAL           ECG/ECHO:  [unfilled]       Notes reviewed from all clinical/nonclinical/nursing services involved in patient's clinical care. Care coordination discussions were held with appropriate clinical/nonclinical/ nursing providers based on care coordination needs.     Assessment and plan   Given the patient's current clinical

## 2024-09-29 NOTE — ED NOTES
[facility] contacted for BLS transport, eta was over 2 hours. [facility] called for transport with 30 minute eta.

## 2024-09-29 NOTE — ED PROVIDER NOTES
[facility] [place] EMERGENCY CTR  EMERGENCY DEPARTMENT ENCOUNTER      Pt Name: Vianey Hanson  MRN: [id number]  Birthdate 1950  Date of evaluation: 9/29/2024  Provider: Juan Pablo Chapman MD      HISTORY OF PRESENT ILLNESS      [facility]  Patient presenting due to nausea vomiting and diarrhea.  She has been having the symptoms for about 5 days.  She says she has had green mucousy diarrhea for the last 4 to 5 days and then over the last 24 hours has had several episodes of vomiting without blood.  Denies fever or abdominal pain.  She endorses weakness and some lightheadedness.  Patient has had recurrent episodes of diarrhea for some time and follows with [facility] gastroenterology.  About a month ago she was diagnosed with E. coli as the cause of her diarrhea and started on Cipro which improved her symptoms.  Per notes from her gastroenterologist, recurrent diarrhea required CT abdomen and pelvis as well as fecal calprotectin, and stool studies.  She denies fevers.      Nursing Notes were reviewed.    REVIEW OF SYSTEMS         Review of Systems  A complete review of systems was performed and all systems reviewed were negative less otherwise document in the HPI      PAST MEDICAL HISTORY     Past Medical History:   Diagnosis Date   • ACP (advance care planning) 8/2/2016    Patient has an AMD   • Adverse effect of anesthesia     difficulty waking and cold postop   • Arthritis of right foot 5/21/2015    Ortho Dr Torres; used a boot x 2 months, off 2-2015   • Bee sting allergy 7/27/2015   • Benign essential hypertension 8/2/2016   • Cataracts, bilateral 2/3/2017    R>L, Dr Springer   • Diabetes mellitus type 2, noninsulin dependent (HCC) 11/18/2016    10/2014   • GERD (gastroesophageal reflux disease) 3/23/2023   • History of pneumonia 3/18/2010    1963,1989   • History of supraventricular tachycardia 1/20/2019    Admitted to [facility] 12-25-18 for chest pressure and palpitations; medications adjusted, Cardio Dr Casillas for Echo and stress test   •

## 2024-09-29 NOTE — ED NOTES
Attempted to call report to [facility]E RN. RN was busy and requested call back in 10-15mins.

## 2024-09-30 LAB
ALBUMIN SERPL-MCNC: 2.6 G/DL (ref 3.5–5)
ALBUMIN/GLOB SERPL: 0.8 (ref 1.1–2.2)
ALP SERPL-CCNC: 90 U/L (ref 45–117)
ALT SERPL-CCNC: 19 U/L (ref 12–78)
ANION GAP SERPL CALC-SCNC: 6 MMOL/L (ref 2–12)
AST SERPL-CCNC: 23 U/L (ref 15–37)
BILIRUB SERPL-MCNC: 0.5 MG/DL (ref 0.2–1)
BUN SERPL-MCNC: 21 MG/DL (ref 6–20)
BUN/CREAT SERPL: 27 (ref 12–20)
C COLI+JEJUNI TUF STL QL NAA+PROBE: NEGATIVE
C DIFF GDH STL QL: NEGATIVE
C DIFF TOX A+B STL QL IA: NEGATIVE
C DIFF TOXIN INTERPRETATION: NORMAL
CALCIUM SERPL-MCNC: 8.6 MG/DL (ref 8.5–10.1)
CHLORIDE SERPL-SCNC: 110 MMOL/L (ref 97–108)
CO2 SERPL-SCNC: 23 MMOL/L (ref 21–32)
COMMENT:: NORMAL
CREAT SERPL-MCNC: 0.78 MG/DL (ref 0.55–1.02)
EC STX1+STX2 GENES STL QL NAA+PROBE: NEGATIVE
ETEC ELTA+ESTB GENES STL QL NAA+PROBE: NEGATIVE
GLOBULIN SER CALC-MCNC: 3.3 G/DL (ref 2–4)
GLUCOSE SERPL-MCNC: 74 MG/DL (ref 65–100)
P SHIGELLOIDES DNA STL QL NAA+PROBE: NEGATIVE
POTASSIUM SERPL-SCNC: 4 MMOL/L (ref 3.5–5.1)
PROT SERPL-MCNC: 5.9 G/DL (ref 6.4–8.2)
SALMONELLA SP SPAO STL QL NAA+PROBE: NEGATIVE
SHIGELLA SP+EIEC IPAH STL QL NAA+PROBE: NEGATIVE
SODIUM SERPL-SCNC: 139 MMOL/L (ref 136–145)
SPECIMEN HOLD: NORMAL
V CHOL+PARA+VUL DNA STL QL NAA+NON-PROBE: NEGATIVE
Y ENTEROCOL DNA STL QL NAA+NON-PROBE: NEGATIVE

## 2024-09-30 PROCEDURE — 80053 COMPREHEN METABOLIC PANEL: CPT

## 2024-09-30 PROCEDURE — 1100000000 HC RM PRIVATE

## 2024-09-30 PROCEDURE — 2580000003 HC RX 258: Performed by: HOSPITALIST

## 2024-09-30 PROCEDURE — 6360000002 HC RX W HCPCS: Performed by: HOSPITALIST

## 2024-09-30 PROCEDURE — 6370000000 HC RX 637 (ALT 250 FOR IP): Performed by: HOSPITALIST

## 2024-09-30 RX ORDER — DICYCLOMINE HYDROCHLORIDE 10 MG/1
10 CAPSULE ORAL 3 TIMES DAILY PRN
Status: DISCONTINUED | OUTPATIENT
Start: 2024-09-30 | End: 2024-10-01 | Stop reason: HOSPADM

## 2024-09-30 RX ADMIN — METHENAMINE HIPPURATE 1 G: 1 TABLET ORAL at 09:06

## 2024-09-30 RX ADMIN — SERTRALINE HYDROCHLORIDE 50 MG: 50 TABLET ORAL at 09:08

## 2024-09-30 RX ADMIN — METHENAMINE HIPPURATE 1 G: 1 TABLET ORAL at 17:56

## 2024-09-30 RX ADMIN — CLOPIDOGREL BISULFATE 75 MG: 75 TABLET ORAL at 09:08

## 2024-09-30 RX ADMIN — ATORVASTATIN CALCIUM 10 MG: 10 TABLET, FILM COATED ORAL at 09:08

## 2024-09-30 RX ADMIN — POTASSIUM CHLORIDE AND SODIUM CHLORIDE: 900; 300 INJECTION, SOLUTION INTRAVENOUS at 05:15

## 2024-09-30 RX ADMIN — ENOXAPARIN SODIUM 40 MG: 100 INJECTION SUBCUTANEOUS at 09:09

## 2024-09-30 RX ADMIN — TROSPIUM CHLORIDE 20 MG: 20 TABLET, FILM COATED ORAL at 07:18

## 2024-09-30 RX ADMIN — ONDANSETRON 4 MG: 2 INJECTION INTRAMUSCULAR; INTRAVENOUS at 20:45

## 2024-09-30 RX ADMIN — TROSPIUM CHLORIDE 20 MG: 20 TABLET, FILM COATED ORAL at 17:56

## 2024-09-30 RX ADMIN — METOPROLOL TARTRATE 25 MG: 25 TABLET, FILM COATED ORAL at 09:08

## 2024-09-30 RX ADMIN — POTASSIUM CHLORIDE AND SODIUM CHLORIDE: 900; 300 INJECTION, SOLUTION INTRAVENOUS at 21:42

## 2024-09-30 RX ADMIN — SODIUM CHLORIDE, PRESERVATIVE FREE 10 ML: 5 INJECTION INTRAVENOUS at 09:08

## 2024-09-30 RX ADMIN — SODIUM CHLORIDE, PRESERVATIVE FREE 10 ML: 5 INJECTION INTRAVENOUS at 20:46

## 2024-09-30 RX ADMIN — FAMOTIDINE 40 MG: 20 TABLET, FILM COATED ORAL at 12:05

## 2024-09-30 RX ADMIN — METOPROLOL TARTRATE 25 MG: 25 TABLET, FILM COATED ORAL at 20:45

## 2024-09-30 RX ADMIN — ONDANSETRON 4 MG: 2 INJECTION INTRAMUSCULAR; INTRAVENOUS at 13:05

## 2024-09-30 RX ADMIN — Medication 1 CAPSULE: at 09:08

## 2024-09-30 RX ADMIN — ONDANSETRON 4 MG: 2 INJECTION INTRAMUSCULAR; INTRAVENOUS at 04:12

## 2024-09-30 NOTE — CONSULTS
[facility] [address] [address]          GASTROENTEROLOGY CONSULTATION NOTE  Dia Mehta PA-C  [phone] office  NP/PA in-hospital M-F until 4:30PM  After 5PM or on weekends, please call  for physician on call        NAME:  Vinaey Hanson   :   1950   MRN:   [id number]       Referring Physician: Rohan    Consult Date: 2024 11:43 AM    Chief Complaint: Acute on chronic diarrhea     History of Present Illness:  Patient is a 74 y.o. who is seen in consultation at the request of Dr. Madrigal for acute on chronic diarrhea. The patient has a past medical history as below. She initially presented to [facility] for non-bloody diarrhea 10+ times per day. Reports a history of E.coli that was found about 8 weeks ago. She reports feeling slightly better 2 weeks ago, but symptoms returned after visiting [facility] in [place]. She reports nausea with some vomiting. Has been having loose, watery, green stools. Had 4 stools overnight and has not been able to sleep. Intermittent abdominal pain. Is typically followed by [facility] GI for her chronic diarrhea. PT reports a conversation about a possible colonoscopy with her usual GI providers as she is due, but is concerned the prep may cause long term GI upset.     I have reviewed the emergency room note, hospital admission note, notes by all other clinicians who have seen the patient during this hospitalization to date. I have reviewed the problem list and the reason for this hospitalization. I have reviewed the allergies and the medications the patient was taking at home prior to this hospitalization.    PMH:  Past Medical History:   Diagnosis Date    ACP (advance care planning) 2016    Patient has an AMD    Adverse effect of anesthesia     difficulty waking and cold postop    Arthritis of right foot 2015    Ortho Dr Torres; used a boot x 2 months, off     Bee sting allergy 2015    Benign essential

## 2024-09-30 NOTE — PROGRESS NOTES
[facility]                                                                                          Hospitalist Progress Note  David M Milligram, PA-C  Answering service: [phone] OR [phone] from in house phone        Date of Service:  2024  NAME:  Vianey Hanson  :  1950  MRN:  [id number]      Admission Summary:   Vianey Hanson is a 74 y.o. female with history of chronic intermittent diarrhea presented to the [place] ED with acute nonbloody diarrhea up to 7-10 times a day since last Tuesday.  She just returned from a trip from [facility].  Associated symptoms include nausea.  She denied fever or chills or abdominal pain.  She has been barely eating or drinking.  She was recently treated for E. coli gastroenteritis end of 2024.  She had previous bouts of diarrhea and was seen by [facility], no specific diagnosis for her diarrhea is mediated, it was hoped with the recent identification and treatment of E. coli gastroenteritis could solve the problem.       Interval history / Subjective:   Saw the patient on rounds, She reports continue diarrhea and some N/V. Denies any fever, chills, rigors.     Awaiting enteric panel. Explained reassuring labs and imaging and that we will manage symptoms for now.     Assessment & Plan:     Acute on ?chronic gastritis  Hx of Enterotoxigenic E.Coli  - CT with no acute process  -Continue supportive therapy with IV fluids, antiemetics, analgesics.    - ABX not indicated at this time. No evidence of SIRS/Sepsis  - Stool studies including C. difficile pending  - Continue probiotic  - Recommend outpatient follow up with GI for further workup  - PRN antiemetics     KUN  Hyponatremia: mild, improved  Hypokalemia: mild, improved  - 2/2 above, resolved with IVF and repletion  - Follow BMP     Code status: Full  Prophylaxis: Lovenox  Care Plan discussed with: Patient, RN, Attending  Anticipated Disposition: Home, 24 hours

## 2024-10-01 VITALS
WEIGHT: 220.46 LBS | HEIGHT: 66 IN | OXYGEN SATURATION: 91 % | TEMPERATURE: 98.6 F | DIASTOLIC BLOOD PRESSURE: 60 MMHG | SYSTOLIC BLOOD PRESSURE: 129 MMHG | HEART RATE: 93 BPM | BODY MASS INDEX: 35.43 KG/M2 | RESPIRATION RATE: 18 BRPM

## 2024-10-01 LAB
ANION GAP SERPL CALC-SCNC: 9 MMOL/L (ref 2–12)
BASOPHILS # BLD: 0 K/UL (ref 0–0.1)
BASOPHILS NFR BLD: 0 % (ref 0–1)
BUN SERPL-MCNC: 9 MG/DL (ref 6–20)
BUN/CREAT SERPL: 18 (ref 12–20)
CALCIUM SERPL-MCNC: 8.1 MG/DL (ref 8.5–10.1)
CHLORIDE SERPL-SCNC: 109 MMOL/L (ref 97–108)
CO2 SERPL-SCNC: 20 MMOL/L (ref 21–32)
CREAT SERPL-MCNC: 0.51 MG/DL (ref 0.55–1.02)
DIFFERENTIAL METHOD BLD: ABNORMAL
EOSINOPHIL # BLD: 0.1 K/UL (ref 0–0.4)
EOSINOPHIL NFR BLD: 1 % (ref 0–7)
ERYTHROCYTE [DISTWIDTH] IN BLOOD BY AUTOMATED COUNT: 14.9 % (ref 11.5–14.5)
GLUCOSE SERPL-MCNC: 62 MG/DL (ref 65–100)
HCT VFR BLD AUTO: 35.6 % (ref 35–47)
HGB BLD-MCNC: 11.1 G/DL (ref 11.5–16)
IMM GRANULOCYTES # BLD AUTO: 0 K/UL (ref 0–0.04)
IMM GRANULOCYTES NFR BLD AUTO: 1 % (ref 0–0.5)
LYMPHOCYTES # BLD: 1.6 K/UL (ref 0.8–3.5)
LYMPHOCYTES NFR BLD: 23 % (ref 12–49)
MCH RBC QN AUTO: 28.3 PG (ref 26–34)
MCHC RBC AUTO-ENTMCNC: 31.2 G/DL (ref 30–36.5)
MCV RBC AUTO: 90.8 FL (ref 80–99)
MONOCYTES # BLD: 1 K/UL (ref 0–1)
MONOCYTES NFR BLD: 14 % (ref 5–13)
NEUTS SEG # BLD: 4.2 K/UL (ref 1.8–8)
NEUTS SEG NFR BLD: 61 % (ref 32–75)
NRBC # BLD: 0 K/UL (ref 0–0.01)
NRBC BLD-RTO: 0 PER 100 WBC
PLATELET # BLD AUTO: 192 K/UL (ref 150–400)
PMV BLD AUTO: 10.5 FL (ref 8.9–12.9)
POTASSIUM SERPL-SCNC: 5 MMOL/L (ref 3.5–5.1)
RBC # BLD AUTO: 3.92 M/UL (ref 3.8–5.2)
SODIUM SERPL-SCNC: 138 MMOL/L (ref 136–145)
WBC # BLD AUTO: 6.9 K/UL (ref 3.6–11)

## 2024-10-01 PROCEDURE — 36415 COLL VENOUS BLD VENIPUNCTURE: CPT

## 2024-10-01 PROCEDURE — 80048 BASIC METABOLIC PNL TOTAL CA: CPT

## 2024-10-01 PROCEDURE — 6370000000 HC RX 637 (ALT 250 FOR IP): Performed by: HOSPITALIST

## 2024-10-01 PROCEDURE — 85025 COMPLETE CBC W/AUTO DIFF WBC: CPT

## 2024-10-01 PROCEDURE — 6360000002 HC RX W HCPCS: Performed by: HOSPITALIST

## 2024-10-01 PROCEDURE — 2580000003 HC RX 258: Performed by: HOSPITALIST

## 2024-10-01 RX ORDER — ONDANSETRON 4 MG/1
4 TABLET, ORALLY DISINTEGRATING ORAL 3 TIMES DAILY PRN
Qty: 21 TABLET | Refills: 0 | Status: SHIPPED | OUTPATIENT
Start: 2024-10-01

## 2024-10-01 RX ORDER — LACTOBACILLUS RHAMNOSUS GG 10B CELL
1 CAPSULE ORAL
Qty: 30 CAPSULE | Refills: 0 | Status: SHIPPED | OUTPATIENT
Start: 2024-10-02 | End: 2024-11-01

## 2024-10-01 RX ORDER — FAMOTIDINE 40 MG/1
40 TABLET, FILM COATED ORAL EVERY EVENING
Qty: 30 TABLET | Refills: 3 | Status: SHIPPED | OUTPATIENT
Start: 2024-10-01

## 2024-10-01 RX ORDER — LOPERAMIDE HYDROCHLORIDE 2 MG/1
2 TABLET ORAL 4 TIMES DAILY PRN
Qty: 120 TABLET | Refills: 0 | Status: SHIPPED | OUTPATIENT
Start: 2024-10-01 | End: 2024-10-31

## 2024-10-01 RX ORDER — LOPERAMIDE HYDROCHLORIDE 1 MG/5ML
1 SOLUTION ORAL 3 TIMES DAILY PRN
Qty: 210 ML | Refills: 0 | Status: SHIPPED | OUTPATIENT
Start: 2024-10-01 | End: 2024-10-01 | Stop reason: HOSPADM

## 2024-10-01 RX ADMIN — ATORVASTATIN CALCIUM 10 MG: 10 TABLET, FILM COATED ORAL at 08:45

## 2024-10-01 RX ADMIN — ONDANSETRON 4 MG: 4 TABLET, ORALLY DISINTEGRATING ORAL at 10:57

## 2024-10-01 RX ADMIN — Medication 1 CAPSULE: at 08:45

## 2024-10-01 RX ADMIN — SERTRALINE HYDROCHLORIDE 50 MG: 50 TABLET ORAL at 08:45

## 2024-10-01 RX ADMIN — ENOXAPARIN SODIUM 40 MG: 100 INJECTION SUBCUTANEOUS at 11:00

## 2024-10-01 RX ADMIN — TROSPIUM CHLORIDE 20 MG: 20 TABLET, FILM COATED ORAL at 06:02

## 2024-10-01 RX ADMIN — METOPROLOL TARTRATE 25 MG: 25 TABLET, FILM COATED ORAL at 08:45

## 2024-10-01 RX ADMIN — CLOPIDOGREL BISULFATE 75 MG: 75 TABLET ORAL at 08:45

## 2024-10-01 RX ADMIN — METHENAMINE HIPPURATE 1 G: 1 TABLET ORAL at 09:23

## 2024-10-01 RX ADMIN — SODIUM CHLORIDE, PRESERVATIVE FREE 10 ML: 5 INJECTION INTRAVENOUS at 08:52

## 2024-10-01 RX ADMIN — POTASSIUM CHLORIDE AND SODIUM CHLORIDE: 900; 300 INJECTION, SOLUTION INTRAVENOUS at 06:35

## 2024-10-01 NOTE — DISCHARGE SUMMARY
systolic BP (top number) is 110 or less do not take your blood pressure medications  - Follow up with your PCP this week if possible     KUN  Hyponatremia: mild, improved  Hypokalemia: mild, improved  - 2/2 above, resolved with IVF and repletion  - Follow BMP       PENDING TEST RESULTS:   At the time of discharge the following test results are still pending: None    FOLLOW UP APPOINTMENTS:    Follow-up Information    None           ADDITIONAL CARE RECOMMENDATIONS:   Follow up with the Gastroenterology team at [facility]  Follow up with your PCP for management of your hypertension, this week if possible   Obtain a blood pressure cuff and take your blood pressure twice a day. If the systolic BP (top number) is 110 or less do not take your blood pressure medications    DIET: regular diet    ACTIVITY: activity as tolerated    WOUND CARE: N/A    EQUIPMENT needed: N/A      DISCHARGE MEDICATIONS:     Medication List        START taking these medications      lactobacillus capsule  Take 1 capsule by mouth daily (with breakfast)  Start taking on: October 2, 2024     loperamide 1 MG/5ML solution  Commonly known as: Anti-Diarrheal  Take 5 mLs by mouth 3 times daily as needed for Diarrhea            CHANGE how you take these medications      famotidine 40 MG tablet  Commonly known as: PEPCID  Take 1 tablet by mouth every evening  What changed:   when to take this  reasons to take this            CONTINUE taking these medications      acetaminophen 650 MG extended release tablet  Commonly known as: TYLENOL     calcium carb-cholecalciferol 600-10 MG-MCG Tabs per tab     clopidogrel 75 MG tablet  Commonly known as: PLAVIX  TAKE ONE TABLET BY MOUTH ONE TIME DAILY     colestipol 1 g tablet  Commonly known as: COLESTID     CRANBERRY-VITAMIN C-D MANNOSE PO     dilTIAZem 180 MG extended release capsule  Commonly known as: TIAZAC     EpiPen 2-Ashok 0.3 MG/0.3ML Soaj injection  Generic drug: EPINEPHrine     losartan-hydroCHLOROthiazide 100-25 MG

## 2024-10-01 NOTE — DISCHARGE INSTRUCTIONS
Discharge Instructions       PATIENT ID: Vianey Hanson  MRN: [id number]   YOB: 1950    DATE OF ADMISSION: 9/29/2024   DATE OF DISCHARGE: 10/1/2024    PRIMARY CARE PROVIDER: Shelby Dunn     ATTENDING PHYSICIAN: Nicolás Chaney MD   DISCHARGING PROVIDER: David M Milligram, PA-C    To contact this individual call [phone] and ask the  to page.   If unavailable ask to be transferred the Adult Hospitalist Department.    DISCHARGE DIAGNOSES     Acute on ?chronic gastritis  Hx of Enterotoxigenic E.Coli  - CT with no acute process  -Continue supportive therapy with IV fluids, antiemetics, analgesics.    - Stool studies including C. Difficile were all negative  - Continue probiotic, PRN zofran, immodium  - Follow up with [facility] gastroenterology  - Supplement diet with protein shakes as needed to ensure calorie intake. Eat meals slowly and sit upright for 30 minutes after eating.         Hypotension on chronic HTN  - Majority of BP meds were held on admission  - Obtain a blood pressure cuff and take your blood pressure twice a day. If the systolic BP (top number) is 110 or less do not take your blood pressure medications  - Follow up with your PCP this week if possible     KUN  Hyponatremia: mild, improved  Hypokalemia: mild, improved  - 2/2 above, resolved with IVF and repletion  - Follow BMP    CONSULTATIONS: IP CONSULT TO GI    PROCEDURES/SURGERIES: * No surgery found *    PENDING TEST RESULTS:   At the time of discharge the following test results are still pending: none    [facility]   [facility]     ADDITIONAL CARE RECOMMENDATIONS:   Follow up with the Gastroenterology team at [facility]  Follow up with your PCP for management of your hypertension, this week if possible   Obtain a blood pressure cuff and take your blood pressure twice a day. If the systolic BP (top number) is 110 or less do not take your blood pressure medications     DIET: regular diet

## 2024-10-01 NOTE — FLOWSHEET NOTE
10/01/24 1543   AVS Reviewed   AVS & discharge instructions reviewed with patient and/or representative? Yes   Reviewed instructions with Patient   Level of Understanding Verbalized understanding

## 2024-10-01 NOTE — PROGRESS NOTES
[facility]   [address] [address]       GI PROGRESS NOTE  Dia Mehta PA-C  [phone] office  NP/PA in-hospital M-F until 4:30PM  After 5PM or on weekends, please call  for physician on call      NAME: Vianey Hanson   :  1950   MRN:  [id number]       Subjective:     Patient sitting in bedside chair. Feeling better than yesterday. Mentions she was unable to tolerate certain foods yesterday, didn't feel very hungry. Has tried different foods this morning. Had about 4 loose stools yesterday.    Objective:     VITALS:   Last 24hrs VS reviewed since prior progress note. Most recent are:  Vitals:    10/01/24 0845   BP: 129/60   Pulse: 93   Resp:    Temp:    SpO2:        PHYSICAL EXAM:  General: Cooperative, no acute distress    Neurologic:  Alert and oriented X 3.  HEENT: EOMI, no scleral icterus   Lungs:  CTA bilaterally. No wheezing  Heart:  S1 S2, regular rhythm  Abdomen: Soft, non-distended, no tenderness. +Bowel sounds  Extremities: No edema  Psych:   Good insight. Not anxious or agitated.    Lab Data Reviewed:     Recent Results (from the past 24 hour(s))   Basic Metabolic Panel    Collection Time: 10/01/24  6:42 AM   Result Value Ref Range    Sodium 138 136 - 145 mmol/L    Potassium 5.0 3.5 - 5.1 mmol/L    Chloride 109 (H) 97 - 108 mmol/L    CO2 20 (L) 21 - 32 mmol/L    Anion Gap 9 2 - 12 mmol/L    Glucose 62 (L) 65 - 100 mg/dL    BUN 9 6 - 20 MG/DL    Creatinine 0.51 (L) 0.55 - 1.02 MG/DL    BUN/Creatinine Ratio 18 12 - 20      Est, Glom Filt Rate >90 >60 ml/min/1.73m2    Calcium 8.1 (L) 8.5 - 10.1 MG/DL   CBC with Auto Differential    Collection Time: 10/01/24  6:42 AM   Result Value Ref Range    WBC 6.9 3.6 - 11.0 K/uL    RBC 3.92 3.80 - 5.20 M/uL    Hemoglobin 11.1 (L) 11.5 - 16.0 g/dL    Hematocrit 35.6 35.0 - 47.0 %    MCV 90.8 80.0 - 99.0 FL    MCH 28.3 26.0 - 34.0 PG    MCHC 31.2 30.0 - 36.5 g/dL    RDW 14.9 (H) 11.5 - 14.5 %    Platelets 192

## 2024-10-02 LAB
CALPROTECTIN STL-MCNT: 575 UG/G (ref 0–120)
O+P SPEC MICRO: NORMAL
O+P STL CONC: NORMAL
SPECIMEN SOURCE: NORMAL

## 2024-10-03 ENCOUNTER — TELEPHONE (OUTPATIENT)
Age: 74
End: 2024-10-03

## 2024-10-03 NOTE — TELEPHONE ENCOUNTER
Care Transitions Initial Follow Up Call    Outreach made within 2 business days of discharge: Yes    Patient: Vianey Hanson Patient : 1950   MRN: [id number]  Reason for Admission: Acute Kidney Injury  Discharge Date: 10/1/24       Spoke with: KORY    Discharge department/facility: [facility] Interactive Patient Contact:      Scheduled appointment with PCP within 7-14 days    Follow Up  Future Appointments   Date Time Provider Department Center   10/8/2024  2:20 PM Shelby Dunn MD [facility] DEP   2025 10:00 AM Shelby Dunn MD [facility] DEP       Hui Roberts RN

## 2024-10-08 ENCOUNTER — OFFICE VISIT (OUTPATIENT)
Age: 74
End: 2024-10-08

## 2024-10-08 VITALS
HEART RATE: 80 BPM | TEMPERATURE: 98 F | RESPIRATION RATE: 16 BRPM | BODY MASS INDEX: 35.52 KG/M2 | WEIGHT: 221 LBS | OXYGEN SATURATION: 98 % | SYSTOLIC BLOOD PRESSURE: 116 MMHG | DIASTOLIC BLOOD PRESSURE: 68 MMHG | HEIGHT: 66 IN

## 2024-10-08 DIAGNOSIS — F41.9 ANXIETY AND DEPRESSION: ICD-10-CM

## 2024-10-08 DIAGNOSIS — R41.0 TRANSIENT CONFUSION: ICD-10-CM

## 2024-10-08 DIAGNOSIS — E83.51 HYPOCALCEMIA: ICD-10-CM

## 2024-10-08 DIAGNOSIS — Z90.49 S/P CHOLECYSTECTOMY: ICD-10-CM

## 2024-10-08 DIAGNOSIS — E87.6 HYPOKALEMIA: ICD-10-CM

## 2024-10-08 DIAGNOSIS — E86.1 HYPOTENSION DUE TO HYPOVOLEMIA: ICD-10-CM

## 2024-10-08 DIAGNOSIS — Z86.73 HISTORY OF TIA (TRANSIENT ISCHEMIC ATTACK): ICD-10-CM

## 2024-10-08 DIAGNOSIS — F43.9 STRESS: ICD-10-CM

## 2024-10-08 DIAGNOSIS — I10 BENIGN ESSENTIAL HYPERTENSION: ICD-10-CM

## 2024-10-08 DIAGNOSIS — F32.A ANXIETY AND DEPRESSION: ICD-10-CM

## 2024-10-08 DIAGNOSIS — Z09 HOSPITAL DISCHARGE FOLLOW-UP: Primary | ICD-10-CM

## 2024-10-08 DIAGNOSIS — E78.00 HYPERCHOLESTEROLEMIA: ICD-10-CM

## 2024-10-08 DIAGNOSIS — E11.9 DIABETES MELLITUS TYPE 2, NONINSULIN DEPENDENT (HCC): ICD-10-CM

## 2024-10-08 DIAGNOSIS — K52.9 CHRONIC DIARRHEA: ICD-10-CM

## 2024-10-08 RX ORDER — LOSARTAN POTASSIUM 50 MG/1
50 TABLET ORAL DAILY
Qty: 90 TABLET | Refills: 0 | Status: SHIPPED | OUTPATIENT
Start: 2024-10-08

## 2024-10-08 ASSESSMENT — ENCOUNTER SYMPTOMS
RESPIRATORY NEGATIVE: 1
GASTROINTESTINAL NEGATIVE: 1
EYES NEGATIVE: 1

## 2024-10-08 NOTE — PROGRESS NOTES
Chief Complaint   Patient presents with    Follow-Up from Hospital     1. \"Have you been to the ER, urgent care clinic since your last visit?  Hospitalized since your last visit?\" ER @ [place] transferred to [facility] [place]    2. \"Have you seen or consulted any other health care providers outside of the [facility] since your last visit?\" No     3. For patients aged 45-75: Has the patient had a colonoscopy / FIT/ Cologuard? Yes - no Care Gap present 9/2013 Diverticulosis only, f/u 10 yrs, Dr Balwinder Drake she said that she had a cologuard recently      If the patient is female:    4. For patients aged 40-74: Has the patient had a mammogram within the past 2 years? Yes - no Care Gap present 4/2023 done # Magali Burnham scheduled for next month      5. For patients aged 21-65: Has the patient had a pap smear? NA - based on age or sex      
gradually advancing as tolerated  Ensure adequate hydration  Follow up GI at [facility] for now then plans to establish w/ local GI at [facility] or [facility]  -     CBC; Future  -     Comprehensive Metabolic Panel; Future  -     TSH; Future    3. S/P cholecystectomy 1990's  Continue Colestid bid    4. Hypotension & KUN due to hypovolemia  S/P IVF 9/29/24-10/1/24  DC Hyzaar 100-25 mg  Change to plain Cozaar 50 mg once daily and continue interim home BP monitoring  Continue adequate oral rehydration  Check labs today  -     Comprehensive Metabolic Panel; Future    5. H/o chronic benign essential hypertension w/ recent hypotension  DC Hyzaar 100-25 mg  Change to plain Cozaar 50 mg once daily   Continue Lopressor 25 mg bid & Diltiazem  mg for h/o SVT as well, followed by cardiology  Continue interim home BP monitoring  -     losartan (COZAAR) 50 MG tablet; Take 1 tablet by mouth daily, Disp-90 tablet, R-0Normal    6. Hypokalemia  Due to diarrhea but also on diuretic  S/p repletion during hospital stay  DC Hyzaar 100-25 mg  Change to plain Cozaar 50 mg once daily   -     Comprehensive Metabolic Panel; Future    7. Hypocalcemia  Corrected inpatient  Improved nutrition  Recheck today  -     Comprehensive Metabolic Panel; Future    8. Transient confusion  Likely multifactorial due to metabolic, hypovolemic, medication induced  Remain off Tylenol PM which was dc'd this hospital stay  Recheck CMP today along with:  -     TSH; Future  -     Vitamin B12; Future    9. History of TIA (transient ischemic attack)  Followed and released by Neurology  Continue regimen of Plavix, statin therapy, and Bblocker  Gentle correction of BP status    10. Diabetes mellitus type 2, noninsulin dependent (HCC)  On no antihyperglycemic medications at this time  Stable, at diabetic goal for age working on dietary modification, exercise and weight mgt  Most recent Hgba1c 6.1    11. Hypercholesterolemia  LDL stable and at goal <70 maintained on statin

## 2024-10-09 LAB
ALBUMIN SERPL-MCNC: 3.3 G/DL (ref 3.5–5)
ALBUMIN/GLOB SERPL: 0.9 (ref 1.1–2.2)
ALP SERPL-CCNC: 116 U/L (ref 45–117)
ALT SERPL-CCNC: 25 U/L (ref 12–78)
ANION GAP SERPL CALC-SCNC: 6 MMOL/L (ref 2–12)
AST SERPL-CCNC: 16 U/L (ref 15–37)
BILIRUB SERPL-MCNC: 0.5 MG/DL (ref 0.2–1)
BUN SERPL-MCNC: 14 MG/DL (ref 6–20)
BUN/CREAT SERPL: 15 (ref 12–20)
CALCIUM SERPL-MCNC: 9.3 MG/DL (ref 8.5–10.1)
CHLORIDE SERPL-SCNC: 104 MMOL/L (ref 97–108)
CO2 SERPL-SCNC: 30 MMOL/L (ref 21–32)
CREAT SERPL-MCNC: 0.96 MG/DL (ref 0.55–1.02)
ERYTHROCYTE [DISTWIDTH] IN BLOOD BY AUTOMATED COUNT: 14.6 % (ref 11.5–14.5)
GLOBULIN SER CALC-MCNC: 3.6 G/DL (ref 2–4)
GLUCOSE SERPL-MCNC: 123 MG/DL (ref 65–100)
HCT VFR BLD AUTO: 41.2 % (ref 35–47)
HGB BLD-MCNC: 12.8 G/DL (ref 11.5–16)
MCH RBC QN AUTO: 27.8 PG (ref 26–34)
MCHC RBC AUTO-ENTMCNC: 31.1 G/DL (ref 30–36.5)
MCV RBC AUTO: 89.4 FL (ref 80–99)
NRBC # BLD: 0 K/UL (ref 0–0.01)
NRBC BLD-RTO: 0 PER 100 WBC
PLATELET # BLD AUTO: 302 K/UL (ref 150–400)
PMV BLD AUTO: 10.7 FL (ref 8.9–12.9)
POTASSIUM SERPL-SCNC: 4 MMOL/L (ref 3.5–5.1)
PROT SERPL-MCNC: 6.9 G/DL (ref 6.4–8.2)
RBC # BLD AUTO: 4.61 M/UL (ref 3.8–5.2)
SODIUM SERPL-SCNC: 140 MMOL/L (ref 136–145)
TSH SERPL DL<=0.05 MIU/L-ACNC: 0.56 UIU/ML (ref 0.36–3.74)
VIT B12 SERPL-MCNC: 1063 PG/ML (ref 193–986)
WBC # BLD AUTO: 12.1 K/UL (ref 3.6–11)

## 2024-10-13 DIAGNOSIS — E87.6 HYPOKALEMIA: Primary | ICD-10-CM

## 2024-10-13 DIAGNOSIS — D72.829 LEUKOCYTOSIS, UNSPECIFIED TYPE: ICD-10-CM

## 2024-10-15 RX ORDER — ONDANSETRON 4 MG/1
4 TABLET, ORALLY DISINTEGRATING ORAL 3 TIMES DAILY PRN
Qty: 30 TABLET | Refills: 1 | Status: SHIPPED | OUTPATIENT
Start: 2024-10-15

## 2024-10-31 ENCOUNTER — TELEPHONE (OUTPATIENT)
Age: 74
End: 2024-10-31

## 2024-11-04 ENCOUNTER — OFFICE VISIT (OUTPATIENT)
Age: 74
End: 2024-11-04
Payer: MEDICARE

## 2024-11-04 VITALS
BODY MASS INDEX: 36.19 KG/M2 | OXYGEN SATURATION: 97 % | TEMPERATURE: 98.1 F | SYSTOLIC BLOOD PRESSURE: 138 MMHG | HEIGHT: 66 IN | WEIGHT: 225.2 LBS | HEART RATE: 77 BPM | DIASTOLIC BLOOD PRESSURE: 84 MMHG | RESPIRATION RATE: 16 BRPM

## 2024-11-04 DIAGNOSIS — Z02.89 ENCOUNTER FOR COMPLETION OF FORM WITH PATIENT: ICD-10-CM

## 2024-11-04 DIAGNOSIS — E78.00 HYPERCHOLESTEROLEMIA: ICD-10-CM

## 2024-11-04 DIAGNOSIS — F32.A ANXIETY AND DEPRESSION: ICD-10-CM

## 2024-11-04 DIAGNOSIS — F41.9 ANXIETY AND DEPRESSION: ICD-10-CM

## 2024-11-04 DIAGNOSIS — E11.9 DIABETES MELLITUS TYPE 2, NONINSULIN DEPENDENT (HCC): ICD-10-CM

## 2024-11-04 DIAGNOSIS — E87.6 HYPOKALEMIA: ICD-10-CM

## 2024-11-04 DIAGNOSIS — I10 BENIGN ESSENTIAL HYPERTENSION: Primary | ICD-10-CM

## 2024-11-04 DIAGNOSIS — D72.829 LEUKOCYTOSIS, UNSPECIFIED TYPE: ICD-10-CM

## 2024-11-04 DIAGNOSIS — Z23 ENCOUNTER FOR IMMUNIZATION: ICD-10-CM

## 2024-11-04 DIAGNOSIS — Z86.73 HISTORY OF TIA (TRANSIENT ISCHEMIC ATTACK): ICD-10-CM

## 2024-11-04 PROCEDURE — 99215 OFFICE O/P EST HI 40 MIN: CPT | Performed by: FAMILY MEDICINE

## 2024-11-04 PROCEDURE — 90653 IIV ADJUVANT VACCINE IM: CPT | Performed by: FAMILY MEDICINE

## 2024-11-04 ASSESSMENT — ENCOUNTER SYMPTOMS
RESPIRATORY NEGATIVE: 1
NAUSEA: 0
CONSTIPATION: 0
EYES NEGATIVE: 1
ABDOMINAL PAIN: 0
DIARRHEA: 0
VOMITING: 0

## 2024-11-04 NOTE — PROGRESS NOTES
Chief Complaint   Patient presents with    Blood Pressure Check    Medication Check     1. \"Have you been to the ER, urgent care clinic since your last visit?  Hospitalized since your last visit?\" No    2. \"Have you seen or consulted any other health care providers outside of the [facility] since your last visit?\" Gyn uro Dr Agustin    3. For patients aged 45-75: Has the patient had a colonoscopy / FIT/ Cologuard? Yes - no Care Gap present cologuard 5/2024 cologuard Neg, repeat 3 years , colonscopy scheduled for 11/22/24      If the patient is female:    4. For patients aged 40-74: Has the patient had a mammogram within the past 2 years? Yes - no Care Gap present 10/2024       5. For patients aged 21-65: Has the patient had a pap smear? NA - based on age or sex

## 2024-11-04 NOTE — PROGRESS NOTES
Chief Complaint   Patient presents with    Blood Pressure Check     1 month follow up    Medication Check    Form Completion     [facility]     HISTORY OF PRESENT ILLNESS   HPI  Patient with Benign Essential HTN, Type 2 NIDDM, Hypercholesterolemia, TIA, SVT presents for follow up BP check and medication evaluation. She also brought in paperwork/forms to be completed for admission to [facility]. At her last visit w/ me 10-8-24 for hospital discharge follow up from [facility] 9/29/24-10/1/24 for recurrent diarrhea and hypotension, we changed her previous regimen of Hyzaar 100-25 mg to plain Cozaar 50 mg once daily due to persistently low BP's and feelings of weakness and ongoing dehydration struggles. She is feeling much much better and is back to eating and drinking normally. She is feeling 99.9% better and back to her baseline state of otherwise overall good general health. Her mood is much better. Her BM's are back to normal. She has been monitoring home BP's and brought in a log of readings which have been gradually improving (coming back up from hypotensive range) week by week. The first week running in the 102-110s/60s-70s with pulse rates in the 70s to 80s.  The past 2 weeks running in the 130s-150s/70s-80s (150/82, 151/86, 161/84, 141/67773/81, 148/84) with pulses in the upper 60s to low 80s.  But she is feeling much better in this range.  She is followed annually by her cardiologist and has daily blood pressures monitored through their office.  She has been getting frequent calls from them about the recent rise in her blood pressures.  She reached out to their office to explain what was going on and expressed that she wished to stay the course until today's reassessment.     REVIEW OF SYMPTOMS   Review of Systems   Constitutional: Negative.    Eyes: Negative.    Respiratory: Negative.     Cardiovascular: Negative.    Gastrointestinal:  Negative for abdominal

## 2024-11-05 LAB
ANION GAP SERPL CALC-SCNC: 6 MMOL/L (ref 2–12)
BASOPHILS # BLD: 0.1 K/UL (ref 0–0.1)
BASOPHILS NFR BLD: 1 % (ref 0–1)
BUN SERPL-MCNC: 15 MG/DL (ref 6–20)
BUN/CREAT SERPL: 21 (ref 12–20)
CALCIUM SERPL-MCNC: 9.5 MG/DL (ref 8.5–10.1)
CHLORIDE SERPL-SCNC: 107 MMOL/L (ref 97–108)
CO2 SERPL-SCNC: 30 MMOL/L (ref 21–32)
CREAT SERPL-MCNC: 0.73 MG/DL (ref 0.55–1.02)
DIFFERENTIAL METHOD BLD: ABNORMAL
EOSINOPHIL # BLD: 0.1 K/UL (ref 0–0.4)
EOSINOPHIL NFR BLD: 2 % (ref 0–7)
ERYTHROCYTE [DISTWIDTH] IN BLOOD BY AUTOMATED COUNT: 14.8 % (ref 11.5–14.5)
GLUCOSE SERPL-MCNC: 121 MG/DL (ref 65–100)
HCT VFR BLD AUTO: 38.4 % (ref 35–47)
HGB BLD-MCNC: 12 G/DL (ref 11.5–16)
IMM GRANULOCYTES # BLD AUTO: 0 K/UL (ref 0–0.04)
IMM GRANULOCYTES NFR BLD AUTO: 0 % (ref 0–0.5)
LYMPHOCYTES # BLD: 1.7 K/UL (ref 0.8–3.5)
LYMPHOCYTES NFR BLD: 24 % (ref 12–49)
MCH RBC QN AUTO: 28 PG (ref 26–34)
MCHC RBC AUTO-ENTMCNC: 31.3 G/DL (ref 30–36.5)
MCV RBC AUTO: 89.5 FL (ref 80–99)
MONOCYTES # BLD: 0.6 K/UL (ref 0–1)
MONOCYTES NFR BLD: 8 % (ref 5–13)
NEUTS SEG # BLD: 4.5 K/UL (ref 1.8–8)
NEUTS SEG NFR BLD: 65 % (ref 32–75)
NRBC # BLD: 0 K/UL (ref 0–0.01)
NRBC BLD-RTO: 0 PER 100 WBC
PLATELET # BLD AUTO: 252 K/UL (ref 150–400)
PMV BLD AUTO: 10.9 FL (ref 8.9–12.9)
POTASSIUM SERPL-SCNC: 3.8 MMOL/L (ref 3.5–5.1)
RBC # BLD AUTO: 4.29 M/UL (ref 3.8–5.2)
SODIUM SERPL-SCNC: 143 MMOL/L (ref 136–145)
WBC # BLD AUTO: 7 K/UL (ref 3.6–11)

## 2024-11-19 ENCOUNTER — TELEPHONE (OUTPATIENT)
Age: 74
End: 2024-11-19

## 2024-11-19 NOTE — TELEPHONE ENCOUNTER
Call and spoke to patient, two ID confirmed.   Writer informed patient that she needed to call the GI that is doing the procedure, due to patient Colonoscopy and Endoscopy due to they should have sent her a letter to inform her what she is suppose to not take and when to start back.   Pt verbalized understanding of information discussed w/ no further questions at this time.   Teresa Donato LPN

## 2024-12-09 DIAGNOSIS — F43.21 ADJUSTMENT DISORDER WITH DEPRESSED MOOD: ICD-10-CM

## 2024-12-11 ENCOUNTER — TELEPHONE (OUTPATIENT)
Age: 74
End: 2024-12-11

## 2024-12-11 NOTE — TELEPHONE ENCOUNTER
Pt states she returning call from yesterday called states to call back to Dr. Wu and would like a call back.    BCBN [phone]

## 2024-12-11 NOTE — TELEPHONE ENCOUNTER
PCP: Shelby Dunn MD    Last appt: 11/4/2024   Future Appointments   Date Time Provider Department Center   6/16/2025 10:00 AM Shelby Dunn MD [facility] DEP       Requested Prescriptions     Pending Prescriptions Disp Refills    sertraline (ZOLOFT) 50 MG tablet [Pharmacy Med Name: SERTRALINE 50 MG TAB[*]] 90 tablet 1     Sig: TAKE ONE TABLET BY MOUTH ONE TIME DAILY

## 2024-12-12 NOTE — TELEPHONE ENCOUNTER
Call and spoke to patient, two ID confirmed.   Patient updated writer that Colonoscopy and Endoscopy was Neg from either test. These testes are due the next 7 years. She also stated that she will be doing Cont. Care at [facility]. Which is like assistance living.     She will continue Care with you as her provider.

## 2024-12-26 DIAGNOSIS — I10 BENIGN ESSENTIAL HYPERTENSION: ICD-10-CM

## 2024-12-26 NOTE — TELEPHONE ENCOUNTER
PCP: Shelby Dunn MD      Future Appointments   Date Time Provider Department Center   6/16/2025 10:00 AM Shelby Dunn MD [facility] DEP       Requested Prescriptions     Pending Prescriptions Disp Refills    losartan (COZAAR) 50 MG tablet [Pharmacy Med Name: LOSARTAN 50 MG TAB[*]] 90 tablet 0     Sig: TAKE ONE TABLET BY MOUTH ONE TIME DAILY       Prior labs and Blood pressures:  BP Readings from Last 3 Encounters:   11/04/24 138/84   10/08/24 116/68   10/01/24 129/60     Lab Results   Component Value Date/Time     11/04/2024 02:06 PM    K 3.8 11/04/2024 02:06 PM     11/04/2024 02:06 PM    CO2 30 11/04/2024 02:06 PM    BUN 15 11/04/2024 02:06 PM    GFRAA >60 12/08/2021 10:52 AM     No results found for: \"HBA1C\", \"[id number]\"  Lab Results   Component Value Date/Time    CHOL 124 04/29/2024 10:10 AM    HDL 47 04/29/2024 10:10 AM    LDL 55.6 04/29/2024 10:10 AM    VLDL 21.4 04/29/2024 10:10 AM     No results found for: \"VITD3\"    Lab Results   Component Value Date/Time    TSH 0.56 10/08/2024 03:57 PM

## 2024-12-27 RX ORDER — LOSARTAN POTASSIUM 50 MG/1
50 TABLET ORAL DAILY
Qty: 90 TABLET | Refills: 1 | Status: SHIPPED | OUTPATIENT
Start: 2024-12-27

## 2025-02-03 ENCOUNTER — OFFICE VISIT (OUTPATIENT)
Age: 75
End: 2025-02-03
Payer: MEDICARE

## 2025-02-03 VITALS
TEMPERATURE: 97.1 F | SYSTOLIC BLOOD PRESSURE: 134 MMHG | BODY MASS INDEX: 36.03 KG/M2 | WEIGHT: 224.2 LBS | HEART RATE: 69 BPM | RESPIRATION RATE: 18 BRPM | OXYGEN SATURATION: 96 % | HEIGHT: 66 IN | DIASTOLIC BLOOD PRESSURE: 75 MMHG

## 2025-02-03 DIAGNOSIS — I10 BENIGN ESSENTIAL HYPERTENSION: ICD-10-CM

## 2025-02-03 DIAGNOSIS — R35.0 URINARY FREQUENCY: ICD-10-CM

## 2025-02-03 DIAGNOSIS — R05.8 COUGH WITH CONGESTION OF PARANASAL SINUS: ICD-10-CM

## 2025-02-03 DIAGNOSIS — E11.9 DIABETES MELLITUS TYPE 2, NONINSULIN DEPENDENT (HCC): ICD-10-CM

## 2025-02-03 DIAGNOSIS — N32.81 OVERACTIVE BLADDER: ICD-10-CM

## 2025-02-03 DIAGNOSIS — H69.93 DYSFUNCTION OF BOTH EUSTACHIAN TUBES: ICD-10-CM

## 2025-02-03 DIAGNOSIS — Z91.09 ENVIRONMENTAL ALLERGIES: Primary | ICD-10-CM

## 2025-02-03 DIAGNOSIS — R09.81 COUGH WITH CONGESTION OF PARANASAL SINUS: ICD-10-CM

## 2025-02-03 LAB
BILIRUBIN, URINE, POC: NEGATIVE
BLOOD URINE, POC: NEGATIVE
GLUCOSE URINE, POC: NEGATIVE
KETONES, URINE, POC: NEGATIVE
LEUKOCYTE ESTERASE, URINE, POC: NEGATIVE
NITRITE, URINE, POC: NEGATIVE
PH, URINE, POC: 5.5 (ref 4.6–8)
PROTEIN,URINE, POC: NEGATIVE
SPECIFIC GRAVITY, URINE, POC: 1.03 (ref 1–1.03)
URINALYSIS CLARITY, POC: CLEAR
URINALYSIS COLOR, POC: YELLOW
UROBILINOGEN, POC: NORMAL MG/DL

## 2025-02-03 PROCEDURE — 99214 OFFICE O/P EST MOD 30 MIN: CPT | Performed by: FAMILY MEDICINE

## 2025-02-03 PROCEDURE — 3078F DIAST BP <80 MM HG: CPT | Performed by: FAMILY MEDICINE

## 2025-02-03 PROCEDURE — 81002 URINALYSIS NONAUTO W/O SCOPE: CPT | Performed by: FAMILY MEDICINE

## 2025-02-03 PROCEDURE — 1160F RVW MEDS BY RX/DR IN RCRD: CPT | Performed by: FAMILY MEDICINE

## 2025-02-03 PROCEDURE — G8427 DOCREV CUR MEDS BY ELIG CLIN: HCPCS | Performed by: FAMILY MEDICINE

## 2025-02-03 PROCEDURE — 1125F AMNT PAIN NOTED PAIN PRSNT: CPT | Performed by: FAMILY MEDICINE

## 2025-02-03 PROCEDURE — 1090F PRES/ABSN URINE INCON ASSESS: CPT | Performed by: FAMILY MEDICINE

## 2025-02-03 PROCEDURE — 1159F MED LIST DOCD IN RCRD: CPT | Performed by: FAMILY MEDICINE

## 2025-02-03 PROCEDURE — 1036F TOBACCO NON-USER: CPT | Performed by: FAMILY MEDICINE

## 2025-02-03 PROCEDURE — G8417 CALC BMI ABV UP PARAM F/U: HCPCS | Performed by: FAMILY MEDICINE

## 2025-02-03 PROCEDURE — G8399 PT W/DXA RESULTS DOCUMENT: HCPCS | Performed by: FAMILY MEDICINE

## 2025-02-03 PROCEDURE — PBSHW AMB POC URINALYSIS DIP STICK MANUAL W/O MICRO: Performed by: FAMILY MEDICINE

## 2025-02-03 PROCEDURE — 1123F ACP DISCUSS/DSCN MKR DOCD: CPT | Performed by: FAMILY MEDICINE

## 2025-02-03 PROCEDURE — 3075F SYST BP GE 130 - 139MM HG: CPT | Performed by: FAMILY MEDICINE

## 2025-02-03 RX ORDER — FEXOFENADINE HCL 180 MG/1
180 TABLET ORAL DAILY
Qty: 30 TABLET | Refills: 0 | Status: SHIPPED | OUTPATIENT
Start: 2025-02-03 | End: 2025-03-05

## 2025-02-03 RX ORDER — BENZONATATE 200 MG/1
200 CAPSULE ORAL 3 TIMES DAILY PRN
Qty: 21 CAPSULE | Refills: 0 | Status: SHIPPED | OUTPATIENT
Start: 2025-02-03 | End: 2025-02-10

## 2025-02-03 RX ORDER — TRIAMCINOLONE ACETONIDE 55 UG/1
2 SPRAY, METERED NASAL DAILY
Qty: 1 EACH | Refills: 0 | Status: SHIPPED | OUTPATIENT
Start: 2025-02-03

## 2025-02-03 SDOH — ECONOMIC STABILITY: FOOD INSECURITY: WITHIN THE PAST 12 MONTHS, THE FOOD YOU BOUGHT JUST DIDN'T LAST AND YOU DIDN'T HAVE MONEY TO GET MORE.: NEVER TRUE

## 2025-02-03 SDOH — ECONOMIC STABILITY: FOOD INSECURITY: WITHIN THE PAST 12 MONTHS, YOU WORRIED THAT YOUR FOOD WOULD RUN OUT BEFORE YOU GOT MONEY TO BUY MORE.: NEVER TRUE

## 2025-02-03 ASSESSMENT — PATIENT HEALTH QUESTIONNAIRE - PHQ9
9. THOUGHTS THAT YOU WOULD BE BETTER OFF DEAD, OR OF HURTING YOURSELF: NOT AT ALL
3. TROUBLE FALLING OR STAYING ASLEEP: NEARLY EVERY DAY
SUM OF ALL RESPONSES TO PHQ QUESTIONS 1-9: 9
8. MOVING OR SPEAKING SO SLOWLY THAT OTHER PEOPLE COULD HAVE NOTICED. OR THE OPPOSITE, BEING SO FIGETY OR RESTLESS THAT YOU HAVE BEEN MOVING AROUND A LOT MORE THAN USUAL: NOT AT ALL
4. FEELING TIRED OR HAVING LITTLE ENERGY: NEARLY EVERY DAY
SUM OF ALL RESPONSES TO PHQ QUESTIONS 1-9: 9
SUM OF ALL RESPONSES TO PHQ QUESTIONS 1-9: 9
10. IF YOU CHECKED OFF ANY PROBLEMS, HOW DIFFICULT HAVE THESE PROBLEMS MADE IT FOR YOU TO DO YOUR WORK, TAKE CARE OF THINGS AT HOME, OR GET ALONG WITH OTHER PEOPLE: NOT DIFFICULT AT ALL
SUM OF ALL RESPONSES TO PHQ QUESTIONS 1-9: 9
6. FEELING BAD ABOUT YOURSELF - OR THAT YOU ARE A FAILURE OR HAVE LET YOURSELF OR YOUR FAMILY DOWN: NOT AT ALL
7. TROUBLE CONCENTRATING ON THINGS, SUCH AS READING THE NEWSPAPER OR WATCHING TELEVISION: NOT AT ALL
2. FEELING DOWN, DEPRESSED OR HOPELESS: NOT AT ALL
5. POOR APPETITE OR OVEREATING: NEARLY EVERY DAY

## 2025-02-03 ASSESSMENT — ENCOUNTER SYMPTOMS
RHINORRHEA: 1
SINUS PAIN: 0
COUGH: 1
SORE THROAT: 0
SHORTNESS OF BREATH: 0
WHEEZING: 0
GASTROINTESTINAL NEGATIVE: 1

## 2025-02-03 NOTE — PROGRESS NOTES
No chief complaint on file.    \"Have you been to the ER, urgent care clinic since your last visit?  Hospitalized since your last visit?\"    NO    “Have you seen or consulted any other health care providers outside of [facility] since your last visit?”    NO                   2/3/2025     2:32 PM   PHQ-9    Feeling down, depressed, or hopeless 0   Trouble falling or staying asleep, or sleeping too much 3   Feeling tired or having little energy 3   Poor appetite or overeating 3   Feeling bad about yourself - or that you are a failure or have let yourself or your family down 0   Trouble concentrating on things, such as reading the newspaper or watching television 0   Moving or speaking so slowly that other people could have noticed. Or the opposite - being so fidgety or restless that you have been moving around a lot more than usual 0   Thoughts that you would be better off dead, or of hurting yourself in some way 0   PHQ-9 Total Score 9   If you checked off any problems, how difficult have these problems made it for you to do your work, take care of things at home, or get along with other people? 0           Financial Resource Strain: Low Risk  (4/22/2024)    Overall Financial Resource Strain (CARDIA)     Difficulty of Paying Living Expenses: Not hard at all      Food Insecurity: No Food Insecurity (2/3/2025)    Hunger Vital Sign     Worried About Running Out of Food in the Last Year: Never true     Ran Out of Food in the Last Year: Never true          There are no preventive care reminders to display for this patient.

## 2025-02-03 NOTE — PROGRESS NOTES
Chief Complaint   Patient presents with    Allergies     2-3 weeks    Urinary Frequency     2 nights     HISTORY OF PRESENT ILLNESS   HPI  Patient is in the process of packing up and painting her home in preparation for selling her home and moving into independent living later this month. After painting and stirring up dust 3 weeks ago she has been having nasal congestion, runny nose, sneezing, puffy eyes, post nasal drainage, clear nasal dc, dry cough, ears stuffy/stopped up and hard of hearing. No purulent nasal dc, purulent sputum, fevers, chills, sweats. She typically takes Zyrtec daily for allergic rhinitis that peaks certain times of year as well. She started taking Coricidin HBP Cold & Cough 2-3 days ago. She also complains of increased urinary freq and nocturia the past 2 nights. Timing of which began when she started the otc cold meds. She denies urinary pain, abdominal pain, hematuria. She has h/o OAB and urinary incontinence and is followed by her urologist ~ q 6 months. She reports just being seen there about 2 months ago. All stable. She also went to urgent care in  for UTI type symptoms. Her UA and final urine culture were negative. She was out of town during that time, so urgent care empirically treated her w/ abx.      REVIEW OF SYMPTOMS   Review of Systems   Constitutional:  Negative for chills and fever.   HENT:  Positive for congestion, postnasal drip, rhinorrhea and sneezing. Negative for ear pain, sinus pain and sore throat.    Respiratory:  Positive for cough. Negative for shortness of breath and wheezing.    Cardiovascular: Negative.    Gastrointestinal: Negative.    Genitourinary:  Positive for frequency. Negative for decreased urine volume, dysuria and hematuria.   Neurological: Negative.              PROBLEM LIST/MEDICAL HISTORY     Patient Active Problem List    Diagnosis Date Noted    Chronic diarrhea 10/08/2024     Overview Note:     4/2021; Initially diagnosed pancreatic

## 2025-02-14 DIAGNOSIS — R09.81 COUGH WITH CONGESTION OF PARANASAL SINUS: ICD-10-CM

## 2025-02-14 DIAGNOSIS — R05.8 COUGH WITH CONGESTION OF PARANASAL SINUS: ICD-10-CM

## 2025-02-19 RX ORDER — BENZONATATE 200 MG/1
CAPSULE ORAL
Qty: 21 CAPSULE | Refills: 0 | OUTPATIENT
Start: 2025-02-19

## 2025-03-11 ENCOUNTER — TELEPHONE (OUTPATIENT)
Age: 75
End: 2025-03-11

## 2025-03-11 RX ORDER — COLESTIPOL HYDROCHLORIDE 1 G/1
1 TABLET ORAL 2 TIMES DAILY
Qty: 180 TABLET | Refills: 0 | Status: CANCELLED | OUTPATIENT
Start: 2025-03-11

## 2025-03-11 RX ORDER — FAMOTIDINE 40 MG/1
40 TABLET, FILM COATED ORAL EVERY EVENING
Qty: 90 TABLET | Refills: 1 | Status: SHIPPED | OUTPATIENT
Start: 2025-03-11

## 2025-03-11 NOTE — TELEPHONE ENCOUNTER
MD PINA,    [facility] sending refill request for colestipol 1gm tablets.      Are you providing now or GI md?  It looks like you have not prescribed in recent past.  (Rx notes 1 gm BID per GI)     (Last rx by FE: 7/12/21 180)    **also request for famotidine 40mg which was prescribed by David Milligram, PA-C ([facility] oncology)      Will call [facility] or send?- just let me know!      Thanks, Meeta    Last appointment: 2/3/25 MD PINA  Next appointment: 6/16/25 MD PINA  Previous refill encounter(s):   Famotidine 10/1/24 30 + 3  Colestipol 7/12/21 180    Requested Prescriptions     Pending Prescriptions Disp Refills    famotidine (PEPCID) 40 MG tablet 90 tablet 0     Sig: Take 1 tablet by mouth every evening    colestipol (COLESTID) 1 g tablet 180 tablet 0     Sig: Take 1 tablet by mouth 2 times daily Per GI     For Pharmacy Admin Tracking Only    Program: Medication Refill  CPA in place:    Recommendation Provided To:   Intervention Detail: New Rx: 2, reason: Patient Preference  Intervention Accepted By:   Gap Closed?:    Time Spent (min): 5

## 2025-03-12 NOTE — TELEPHONE ENCOUNTER
Meeta is correct. I have not been prescribing the Colestid. Advise pt she needs to check w/ GI. Have her look at her bottle to see last fill date and last prescribing doctor. I sent in the Pepcid. We have prescribed this for her over time.

## 2025-03-12 NOTE — TELEPHONE ENCOUNTER
To pt to let her know that Dr Wu sent in rx for the pepcid.  She needs to check pill bottle for the colestid.  Pt said that she will contact GI for refill.

## 2025-03-13 DIAGNOSIS — F43.21 ADJUSTMENT DISORDER WITH DEPRESSED MOOD: ICD-10-CM

## 2025-03-16 RX ORDER — COLESTIPOL HYDROCHLORIDE 1 G/1
1 TABLET ORAL 2 TIMES DAILY
Qty: 120 TABLET | Refills: 4 | OUTPATIENT
Start: 2025-03-16

## 2025-04-09 LAB
ALBUMIN SERPL ELPH-MCNC: 3.2 G/DL (ref 2.9–4.4)
ALBUMIN/GLOB SERPL: 1.1 (ref 0.7–1.7)
ALPHA1 GLOB SERPL ELPH-MCNC: 0.2 G/DL (ref 0–0.4)
ALPHA2 GLOB SERPL ELPH-MCNC: 0.7 G/DL (ref 0.4–1)
B-GLOBULIN SERPL ELPH-MCNC: 0.9 G/DL (ref 0.7–1.3)
GAMMA GLOB SERPL ELPH-MCNC: 1.1 G/DL (ref 0.4–1.8)
GLOBULIN SER CALC-MCNC: 3 G/DL (ref 2.2–3.9)
KAPPA LC FREE SER-MCNC: 173 MG/L (ref 3.3–19.4)
KAPPA LC FREE/LAMBDA FREE SER: 14.66 (ref 0.26–1.65)
LABORATORY COMMENT REPORT: ABNORMAL
LAMBDA LC FREE SERPL-MCNC: 11.8 MG/L (ref 5.7–26.3)
M PROTEIN SERPL ELPH-MCNC: 0.6 G/DL
PROT SERPL-MCNC: 6.2 G/DL (ref 6–8.5)

## 2025-04-19 ENCOUNTER — APPOINTMENT (OUTPATIENT)
Facility: HOSPITAL | Age: 75
DRG: 193 | End: 2025-04-19
Payer: MEDICARE

## 2025-04-19 ENCOUNTER — HOSPITAL ENCOUNTER (INPATIENT)
Facility: HOSPITAL | Age: 75
LOS: 1 days | Discharge: HOME | DRG: 193 | End: 2025-04-20
Attending: EMERGENCY MEDICINE | Admitting: INTERNAL MEDICINE
Payer: MEDICARE

## 2025-04-19 DIAGNOSIS — J18.9 PNEUMONIA OF RIGHT LOWER LOBE DUE TO INFECTIOUS ORGANISM: ICD-10-CM

## 2025-04-19 DIAGNOSIS — I50.9 ACUTE HEART FAILURE, UNSPECIFIED HEART FAILURE TYPE (HCC): Primary | ICD-10-CM

## 2025-04-19 DIAGNOSIS — I50.9 NEW ONSET OF CONGESTIVE HEART FAILURE (HCC): ICD-10-CM

## 2025-04-19 DIAGNOSIS — J18.9 PNEUMONIA OF RIGHT MIDDLE LOBE DUE TO INFECTIOUS ORGANISM: ICD-10-CM

## 2025-04-19 DIAGNOSIS — M79.89 LEG SWELLING: ICD-10-CM

## 2025-04-19 DIAGNOSIS — R09.02 HYPOXIC: ICD-10-CM

## 2025-04-19 PROBLEM — J96.01 ACUTE RESPIRATORY FAILURE WITH HYPOXIA (HCC): Status: ACTIVE | Noted: 2025-04-19

## 2025-04-19 PROBLEM — R06.00 ACUTE DYSPNEA: Status: ACTIVE | Noted: 2025-04-19

## 2025-04-19 LAB
ALBUMIN SERPL-MCNC: 3 G/DL (ref 3.5–5)
ALBUMIN/GLOB SERPL: 0.8 (ref 1.1–2.2)
ALP SERPL-CCNC: 110 U/L (ref 45–117)
ALT SERPL-CCNC: 20 U/L (ref 12–78)
ANION GAP SERPL CALC-SCNC: 8 MMOL/L (ref 2–12)
APPEARANCE UR: CLEAR
AST SERPL-CCNC: 18 U/L (ref 15–37)
BACTERIA URNS QL MICRO: NEGATIVE /HPF
BASOPHILS # BLD: 0.05 K/UL (ref 0–0.1)
BASOPHILS NFR BLD: 0.8 % (ref 0–1)
BILIRUB SERPL-MCNC: 1 MG/DL (ref 0.2–1)
BILIRUB UR QL: NEGATIVE
BUN SERPL-MCNC: 11 MG/DL (ref 6–20)
BUN/CREAT SERPL: 17 (ref 12–20)
CALCIUM SERPL-MCNC: 9 MG/DL (ref 8.5–10.1)
CHLORIDE SERPL-SCNC: 100 MMOL/L (ref 97–108)
CO2 SERPL-SCNC: 31 MMOL/L (ref 21–32)
COLOR UR: ABNORMAL
CREAT SERPL-MCNC: 0.64 MG/DL (ref 0.55–1.02)
DIFFERENTIAL METHOD BLD: ABNORMAL
EKG ATRIAL RATE: 80 BPM
EKG DIAGNOSIS: NORMAL
EKG P AXIS: 48 DEGREES
EKG P-R INTERVAL: 198 MS
EKG Q-T INTERVAL: 398 MS
EKG QRS DURATION: 86 MS
EKG QTC CALCULATION (BAZETT): 459 MS
EKG R AXIS: 5 DEGREES
EKG T AXIS: 4 DEGREES
EKG VENTRICULAR RATE: 80 BPM
EOSINOPHIL # BLD: 0.05 K/UL (ref 0–0.4)
EOSINOPHIL NFR BLD: 0.8 % (ref 0–7)
EPITH CASTS URNS QL MICRO: ABNORMAL /LPF
ERYTHROCYTE [DISTWIDTH] IN BLOOD BY AUTOMATED COUNT: 15.4 % (ref 11.5–14.5)
FLUAV RNA SPEC QL NAA+PROBE: NOT DETECTED
FLUBV RNA SPEC QL NAA+PROBE: NOT DETECTED
GLOBULIN SER CALC-MCNC: 3.7 G/DL (ref 2–4)
GLUCOSE BLD STRIP.AUTO-MCNC: 143 MG/DL (ref 65–117)
GLUCOSE SERPL-MCNC: 105 MG/DL (ref 65–100)
GLUCOSE UR STRIP.AUTO-MCNC: NEGATIVE MG/DL
HCT VFR BLD AUTO: 37.4 % (ref 35–47)
HGB BLD-MCNC: 12.1 G/DL (ref 11.5–16)
HGB UR QL STRIP: ABNORMAL
IMM GRANULOCYTES # BLD AUTO: 0.03 K/UL (ref 0–0.04)
IMM GRANULOCYTES NFR BLD AUTO: 0.5 % (ref 0–0.5)
KETONES UR QL STRIP.AUTO: NEGATIVE MG/DL
LEUKOCYTE ESTERASE UR QL STRIP.AUTO: NEGATIVE
LIPASE SERPL-CCNC: 18 U/L (ref 13–75)
LYMPHOCYTES # BLD: 0.66 K/UL (ref 0.8–3.5)
LYMPHOCYTES NFR BLD: 10.5 % (ref 12–49)
MCH RBC QN AUTO: 28.7 PG (ref 26–34)
MCHC RBC AUTO-ENTMCNC: 32.4 G/DL (ref 30–36.5)
MCV RBC AUTO: 88.8 FL (ref 80–99)
MONOCYTES # BLD: 0.67 K/UL (ref 0–1)
MONOCYTES NFR BLD: 10.7 % (ref 5–13)
MUCOUS THREADS URNS QL MICRO: ABNORMAL /LPF
NEUTS SEG # BLD: 4.84 K/UL (ref 1.8–8)
NEUTS SEG NFR BLD: 76.7 % (ref 32–75)
NITRITE UR QL STRIP.AUTO: NEGATIVE
NRBC # BLD: 0 K/UL (ref 0–0.01)
NRBC BLD-RTO: 0 PER 100 WBC
NT PRO BNP: 1761 PG/ML (ref 0–450)
PH UR STRIP: 6 (ref 5–8)
PLATELET # BLD AUTO: 181 K/UL (ref 150–400)
PMV BLD AUTO: 9.8 FL (ref 8.9–12.9)
POTASSIUM SERPL-SCNC: 3.7 MMOL/L (ref 3.5–5.1)
PROT SERPL-MCNC: 6.7 G/DL (ref 6.4–8.2)
PROT UR STRIP-MCNC: NEGATIVE MG/DL
RBC # BLD AUTO: 4.21 M/UL (ref 3.8–5.2)
RBC #/AREA URNS HPF: ABNORMAL /HPF (ref 0–5)
RBC MORPH BLD: ABNORMAL
RBC MORPH BLD: ABNORMAL
SARS-COV-2 RNA RESP QL NAA+PROBE: NOT DETECTED
SERVICE CMNT-IMP: ABNORMAL
SODIUM SERPL-SCNC: 139 MMOL/L (ref 136–145)
SOURCE: NORMAL
SP GR UR REFRACTOMETRY: 1.02 (ref 1–1.03)
SPECIMEN HOLD: NORMAL
TROPONIN I SERPL HS-MCNC: 22 NG/L (ref 0–51)
UROBILINOGEN UR QL STRIP.AUTO: 0.2 EU/DL (ref 0.2–1)
WBC # BLD AUTO: 6.3 K/UL (ref 3.6–11)
WBC URNS QL MICRO: ABNORMAL /HPF (ref 0–4)

## 2025-04-19 PROCEDURE — 93010 ELECTROCARDIOGRAM REPORT: CPT | Performed by: INTERNAL MEDICINE

## 2025-04-19 PROCEDURE — 36415 COLL VENOUS BLD VENIPUNCTURE: CPT

## 2025-04-19 PROCEDURE — 85025 COMPLETE CBC W/AUTO DIFF WBC: CPT

## 2025-04-19 PROCEDURE — 93005 ELECTROCARDIOGRAM TRACING: CPT | Performed by: PHYSICIAN ASSISTANT

## 2025-04-19 PROCEDURE — 87636 SARSCOV2 & INF A&B AMP PRB: CPT

## 2025-04-19 PROCEDURE — 84484 ASSAY OF TROPONIN QUANT: CPT

## 2025-04-19 PROCEDURE — 83690 ASSAY OF LIPASE: CPT

## 2025-04-19 PROCEDURE — 96374 THER/PROPH/DIAG INJ IV PUSH: CPT

## 2025-04-19 PROCEDURE — 99285 EMERGENCY DEPT VISIT HI MDM: CPT

## 2025-04-19 PROCEDURE — 6360000002 HC RX W HCPCS: Performed by: INTERNAL MEDICINE

## 2025-04-19 PROCEDURE — 83880 ASSAY OF NATRIURETIC PEPTIDE: CPT

## 2025-04-19 PROCEDURE — 71046 X-RAY EXAM CHEST 2 VIEWS: CPT

## 2025-04-19 PROCEDURE — 80053 COMPREHEN METABOLIC PANEL: CPT

## 2025-04-19 PROCEDURE — 6370000000 HC RX 637 (ALT 250 FOR IP): Performed by: INTERNAL MEDICINE

## 2025-04-19 PROCEDURE — 2580000003 HC RX 258: Performed by: PHYSICIAN ASSISTANT

## 2025-04-19 PROCEDURE — 2060000000 HC ICU INTERMEDIATE R&B

## 2025-04-19 PROCEDURE — 2500000003 HC RX 250 WO HCPCS: Performed by: PHYSICIAN ASSISTANT

## 2025-04-19 PROCEDURE — 71275 CT ANGIOGRAPHY CHEST: CPT

## 2025-04-19 PROCEDURE — 96361 HYDRATE IV INFUSION ADD-ON: CPT

## 2025-04-19 PROCEDURE — 6360000004 HC RX CONTRAST MEDICATION: Performed by: PHYSICIAN ASSISTANT

## 2025-04-19 PROCEDURE — 6360000002 HC RX W HCPCS: Performed by: PHYSICIAN ASSISTANT

## 2025-04-19 PROCEDURE — 2500000003 HC RX 250 WO HCPCS: Performed by: INTERNAL MEDICINE

## 2025-04-19 PROCEDURE — 6370000000 HC RX 637 (ALT 250 FOR IP): Performed by: PHYSICIAN ASSISTANT

## 2025-04-19 PROCEDURE — 82962 GLUCOSE BLOOD TEST: CPT

## 2025-04-19 PROCEDURE — 81001 URINALYSIS AUTO W/SCOPE: CPT

## 2025-04-19 RX ORDER — ACETAMINOPHEN 650 MG/1
650 SUPPOSITORY RECTAL EVERY 6 HOURS PRN
Status: DISCONTINUED | OUTPATIENT
Start: 2025-04-19 | End: 2025-04-20 | Stop reason: HOSPADM

## 2025-04-19 RX ORDER — FAMOTIDINE 20 MG/1
40 TABLET, FILM COATED ORAL EVERY EVENING
Status: DISCONTINUED | OUTPATIENT
Start: 2025-04-19 | End: 2025-04-20 | Stop reason: HOSPADM

## 2025-04-19 RX ORDER — LOSARTAN POTASSIUM 50 MG/1
50 TABLET ORAL DAILY
Status: DISCONTINUED | OUTPATIENT
Start: 2025-04-20 | End: 2025-04-20 | Stop reason: HOSPADM

## 2025-04-19 RX ORDER — LOSARTAN POTASSIUM 50 MG/1
50 TABLET ORAL DAILY
Status: DISCONTINUED | OUTPATIENT
Start: 2025-04-19 | End: 2025-04-19

## 2025-04-19 RX ORDER — MAGNESIUM SULFATE IN WATER 40 MG/ML
2000 INJECTION, SOLUTION INTRAVENOUS PRN
Status: DISCONTINUED | OUTPATIENT
Start: 2025-04-19 | End: 2025-04-20 | Stop reason: HOSPADM

## 2025-04-19 RX ORDER — ATORVASTATIN CALCIUM 10 MG/1
10 TABLET, FILM COATED ORAL DAILY
Status: DISCONTINUED | OUTPATIENT
Start: 2025-04-20 | End: 2025-04-20 | Stop reason: HOSPADM

## 2025-04-19 RX ORDER — SODIUM CHLORIDE 0.9 % (FLUSH) 0.9 %
5-40 SYRINGE (ML) INJECTION EVERY 12 HOURS SCHEDULED
Status: DISCONTINUED | OUTPATIENT
Start: 2025-04-19 | End: 2025-04-20 | Stop reason: HOSPADM

## 2025-04-19 RX ORDER — INSULIN LISPRO 100 [IU]/ML
0-8 INJECTION, SOLUTION INTRAVENOUS; SUBCUTANEOUS
Status: DISCONTINUED | OUTPATIENT
Start: 2025-04-19 | End: 2025-04-20

## 2025-04-19 RX ORDER — ONDANSETRON 2 MG/ML
4 INJECTION INTRAMUSCULAR; INTRAVENOUS
Status: COMPLETED | OUTPATIENT
Start: 2025-04-19 | End: 2025-04-19

## 2025-04-19 RX ORDER — LEVOFLOXACIN 5 MG/ML
750 INJECTION, SOLUTION INTRAVENOUS EVERY 24 HOURS
Status: DISCONTINUED | OUTPATIENT
Start: 2025-04-20 | End: 2025-04-20 | Stop reason: HOSPADM

## 2025-04-19 RX ORDER — SODIUM CHLORIDE 9 MG/ML
INJECTION, SOLUTION INTRAVENOUS PRN
Status: DISCONTINUED | OUTPATIENT
Start: 2025-04-19 | End: 2025-04-20 | Stop reason: HOSPADM

## 2025-04-19 RX ORDER — ENOXAPARIN SODIUM 100 MG/ML
40 INJECTION SUBCUTANEOUS DAILY
Status: DISCONTINUED | OUTPATIENT
Start: 2025-04-20 | End: 2025-04-20 | Stop reason: HOSPADM

## 2025-04-19 RX ORDER — FUROSEMIDE 10 MG/ML
40 INJECTION INTRAMUSCULAR; INTRAVENOUS 2 TIMES DAILY
Status: DISCONTINUED | OUTPATIENT
Start: 2025-04-19 | End: 2025-04-20 | Stop reason: HOSPADM

## 2025-04-19 RX ORDER — POTASSIUM CHLORIDE 7.45 MG/ML
10 INJECTION INTRAVENOUS PRN
Status: DISCONTINUED | OUTPATIENT
Start: 2025-04-19 | End: 2025-04-20

## 2025-04-19 RX ORDER — 0.9 % SODIUM CHLORIDE 0.9 %
500 INTRAVENOUS SOLUTION INTRAVENOUS ONCE
Status: COMPLETED | OUTPATIENT
Start: 2025-04-19 | End: 2025-04-19

## 2025-04-19 RX ORDER — CLOPIDOGREL BISULFATE 75 MG/1
75 TABLET ORAL DAILY
Status: DISCONTINUED | OUTPATIENT
Start: 2025-04-20 | End: 2025-04-20 | Stop reason: HOSPADM

## 2025-04-19 RX ORDER — MORPHINE SULFATE 2 MG/ML
2 INJECTION, SOLUTION INTRAMUSCULAR; INTRAVENOUS EVERY 4 HOURS PRN
Refills: 0 | Status: DISCONTINUED | OUTPATIENT
Start: 2025-04-19 | End: 2025-04-20 | Stop reason: HOSPADM

## 2025-04-19 RX ORDER — IPRATROPIUM BROMIDE AND ALBUTEROL SULFATE 2.5; .5 MG/3ML; MG/3ML
1 SOLUTION RESPIRATORY (INHALATION)
Status: COMPLETED | OUTPATIENT
Start: 2025-04-19 | End: 2025-04-19

## 2025-04-19 RX ORDER — METOPROLOL TARTRATE 25 MG/1
25 TABLET, FILM COATED ORAL EVERY 12 HOURS
Status: DISCONTINUED | OUTPATIENT
Start: 2025-04-19 | End: 2025-04-20 | Stop reason: HOSPADM

## 2025-04-19 RX ORDER — LEVOFLOXACIN 5 MG/ML
750 INJECTION, SOLUTION INTRAVENOUS EVERY 24 HOURS
Status: DISCONTINUED | OUTPATIENT
Start: 2025-04-19 | End: 2025-04-19

## 2025-04-19 RX ORDER — ONDANSETRON 2 MG/ML
4 INJECTION INTRAMUSCULAR; INTRAVENOUS EVERY 6 HOURS PRN
Status: DISCONTINUED | OUTPATIENT
Start: 2025-04-19 | End: 2025-04-20 | Stop reason: HOSPADM

## 2025-04-19 RX ORDER — POTASSIUM CHLORIDE 750 MG/1
40 TABLET, EXTENDED RELEASE ORAL PRN
Status: DISCONTINUED | OUTPATIENT
Start: 2025-04-19 | End: 2025-04-20

## 2025-04-19 RX ORDER — DILTIAZEM HYDROCHLORIDE 180 MG/1
180 CAPSULE, EXTENDED RELEASE ORAL DAILY
Status: DISCONTINUED | OUTPATIENT
Start: 2025-04-20 | End: 2025-04-20 | Stop reason: SDUPTHER

## 2025-04-19 RX ORDER — DEXTROSE MONOHYDRATE 100 MG/ML
INJECTION, SOLUTION INTRAVENOUS CONTINUOUS PRN
Status: DISCONTINUED | OUTPATIENT
Start: 2025-04-19 | End: 2025-04-20 | Stop reason: HOSPADM

## 2025-04-19 RX ORDER — SODIUM CHLORIDE 0.9 % (FLUSH) 0.9 %
5-40 SYRINGE (ML) INJECTION PRN
Status: DISCONTINUED | OUTPATIENT
Start: 2025-04-19 | End: 2025-04-20 | Stop reason: HOSPADM

## 2025-04-19 RX ORDER — POLYETHYLENE GLYCOL 3350 17 G/17G
17 POWDER, FOR SOLUTION ORAL DAILY PRN
Status: DISCONTINUED | OUTPATIENT
Start: 2025-04-19 | End: 2025-04-20 | Stop reason: HOSPADM

## 2025-04-19 RX ORDER — DILTIAZEM HYDROCHLORIDE 180 MG/1
180 CAPSULE, EXTENDED RELEASE ORAL DAILY
Status: DISCONTINUED | OUTPATIENT
Start: 2025-04-19 | End: 2025-04-19

## 2025-04-19 RX ORDER — IOPAMIDOL 755 MG/ML
100 INJECTION, SOLUTION INTRAVASCULAR
Status: COMPLETED | OUTPATIENT
Start: 2025-04-19 | End: 2025-04-19

## 2025-04-19 RX ORDER — ONDANSETRON 4 MG/1
4 TABLET, ORALLY DISINTEGRATING ORAL EVERY 8 HOURS PRN
Status: DISCONTINUED | OUTPATIENT
Start: 2025-04-19 | End: 2025-04-20 | Stop reason: HOSPADM

## 2025-04-19 RX ORDER — ACETAMINOPHEN 325 MG/1
650 TABLET ORAL EVERY 6 HOURS PRN
Status: DISCONTINUED | OUTPATIENT
Start: 2025-04-19 | End: 2025-04-20 | Stop reason: HOSPADM

## 2025-04-19 RX ORDER — IPRATROPIUM BROMIDE AND ALBUTEROL SULFATE 2.5; .5 MG/3ML; MG/3ML
1 SOLUTION RESPIRATORY (INHALATION) EVERY 4 HOURS PRN
Status: DISCONTINUED | OUTPATIENT
Start: 2025-04-19 | End: 2025-04-20 | Stop reason: HOSPADM

## 2025-04-19 RX ADMIN — FUROSEMIDE 40 MG: 10 INJECTION, SOLUTION INTRAMUSCULAR; INTRAVENOUS at 21:19

## 2025-04-19 RX ADMIN — SODIUM CHLORIDE 500 ML: 9 INJECTION, SOLUTION INTRAVENOUS at 12:33

## 2025-04-19 RX ADMIN — ONDANSETRON 4 MG: 2 INJECTION, SOLUTION INTRAMUSCULAR; INTRAVENOUS at 12:24

## 2025-04-19 RX ADMIN — AZITHROMYCIN MONOHYDRATE 500 MG: 500 INJECTION, POWDER, LYOPHILIZED, FOR SOLUTION INTRAVENOUS at 18:42

## 2025-04-19 RX ADMIN — METOPROLOL TARTRATE 25 MG: 25 TABLET, FILM COATED ORAL at 21:19

## 2025-04-19 RX ADMIN — WATER 1000 MG: 1 INJECTION INTRAMUSCULAR; INTRAVENOUS; SUBCUTANEOUS at 18:40

## 2025-04-19 RX ADMIN — IPRATROPIUM BROMIDE AND ALBUTEROL SULFATE 1 DOSE: 2.5; .5 SOLUTION RESPIRATORY (INHALATION) at 12:32

## 2025-04-19 RX ADMIN — ONDANSETRON 4 MG: 2 INJECTION, SOLUTION INTRAMUSCULAR; INTRAVENOUS at 20:28

## 2025-04-19 RX ADMIN — SERTRALINE HYDROCHLORIDE 50 MG: 50 TABLET ORAL at 21:19

## 2025-04-19 RX ADMIN — FAMOTIDINE 40 MG: 20 TABLET, FILM COATED ORAL at 21:19

## 2025-04-19 RX ADMIN — SODIUM CHLORIDE, PRESERVATIVE FREE 10 ML: 5 INJECTION INTRAVENOUS at 21:20

## 2025-04-19 RX ADMIN — IOPAMIDOL 80 ML: 755 INJECTION, SOLUTION INTRAVENOUS at 14:58

## 2025-04-19 ASSESSMENT — LIFESTYLE VARIABLES
HOW MANY STANDARD DRINKS CONTAINING ALCOHOL DO YOU HAVE ON A TYPICAL DAY: 1 OR 2
HOW OFTEN DO YOU HAVE A DRINK CONTAINING ALCOHOL: MONTHLY OR LESS

## 2025-04-19 ASSESSMENT — PAIN SCALES - GENERAL: PAINLEVEL_OUTOF10: 0

## 2025-04-19 NOTE — ED TRIAGE NOTES
Patient arrives with c/o thirst, nausea, headache, cough, confusion. Patient reports decrease in oral fluid intake and hx of dehydration and UTI.

## 2025-04-19 NOTE — H&P
History and Physical    Date of Service:  4/20/2025  Primary Care Provider: Shelby Dunn MD  Source of information: The patient, patient's daughter and son at bedside and review of EMR    Chief Complaint: Nausea and Headache      History of Presenting Illness:   Vianey Hanson is a 75 y.o. female with past medical history significant for hypertension, type 2 diabetes, dyslipidemia, anxiety/depression presented at [facility] emergency room at [facility] with multiple medical complaints including cough, shortness of breath, headache and nausea.  Patient symptoms started a couple of days ago.  Patient also reported fever without chills.  Patient took Tylenol prior to coming to the emergency room without any significant relief.  She tested negative for COVID at her skilled nursing facility.  Patient was last admitted to this hospital from 9/29/2024 to 10/1/2024, she was admitted and treated for colitis.  Chest x-ray in the emergency room was negative.  CTA of the chest was negative for PE but did demonstrate multifocal pneumonia.  Patient was also hypoxic and required being placed on supplemental oxygen.  She was not on oxygen prior to this.  She was started on Rocephin and Zithromax and referred to the hospitalist service for admission.           REVIEW OF SYSTEMS:  REVIEW OF SYSTEMS:  HEAD, EYES, EARS, NOSE AND THROAT:  No headache.  No dizziness.  No blurring of vision.  No photophobia.  RESPIRATORY SYSTEM: Positive for shortness of breath and cough No hemoptysis.  CARDIOVASCULAR SYSTEM:  No chest pain.  Positive for orthopnea.  No palpitations.  GASTROINTESTINAL SYSTEM:  No nausea or vomiting.  No diarrhea.  No constipation.  GENITOURINARY SYSTEM:  No dysuria, no urgency, and no frequency.     All other systems are reviewed and they are negative.        Past Medical History:   Diagnosis Date    ACP (advance care planning) 8/2/2016    Patient has an AMD    Adverse effect of

## 2025-04-19 NOTE — ED PROVIDER NOTES
[facility] DEPARTMENT  EMERGENCY DEPARTMENT ENCOUNTER      Pt Name: Vianey Hanson  MRN: [id number]  Birthdate 1950  Date of evaluation: 4/19/2025  Provider: Giancarlo Salazar PA-C    CHIEF COMPLAINT       Chief Complaint   Patient presents with    Nausea    Headache         HISTORY OF PRESENT ILLNESS   (Location/Symptom, Timing/Onset, Context/Setting, Quality, Duration, Modifying Factors, Severity)  Note limiting factors.   74 yo F PMHx colitis, e.coli colitis, DM II, overactive bladder, recurrent UTI, presenting to ED for flu like symptoms including subjective fever, cough, nausea, increased thirst, decreased appetite, HA for the past 3 days with onset of confusion today.  Elaborates that confusion being feeling that she is sluggish.  Alert and Oriented x 4. Has been taking tylenol for symptoms.  Last took tylenol this morning.  Has been tested for COVID at [facility] nursing facility which was negative. Pt concerned she is dehydrated.  Denies diarrhea, vomiting.             Review of External Medical Records:     Nursing Notes were reviewed.    REVIEW OF SYSTEMS    (2-9 systems for level 4, 10 or more for level 5)     Review of Systems    Except as noted above the remainder of the review of systems was reviewed and negative.       PAST MEDICAL HISTORY     Past Medical History:   Diagnosis Date    ACP (advance care planning) 8/2/2016    Patient has an AMD    Adverse effect of anesthesia     difficulty waking and cold postop    Arthritis of right foot 5/21/2015    Ortho Dr Torres; used a boot x 2 months, off 2-2015    Bee sting allergy 7/27/2015    Benign essential hypertension 8/2/2016    Cataracts, bilateral 2/3/2017    R>L, Dr Springer    Chronic diarrhea 10/08/2024    Initially diagnosed pancreatic insufficiency by NP at [facility] ~ 2021; Patient self referred 2nd opinion to [facility] GI 2021 and diagnosed w/ IBS-Diarrhea instead; Also h/o Lap Cholecystectomy in the 1990's; Started on Colestid by [facility] ~

## 2025-04-19 NOTE — ED NOTES
TRANSFER - OUT REPORT:    Verbal report given to PADMINI Berg on Vianey Hanson  being transferred to  for routine progression of patient care       Report consisted of patient's Situation, Background, Assessment and   Recommendations(SBAR).     Information from the following report(s) Nurse Handoff Report, ED Encounter Summary, ED SBAR, and MAR was reviewed with the receiving nurse.    [place] Fall Assessment:    Presents to emergency department  because of falls (Syncope, seizure, or loss of consciousness): No  Age > 70: Yes  Altered Mental Status, Intoxication with alcohol or substance confusion (Disorientation, impaired judgment, poor safety awaremess, or inability to follow instructions): No  Impaired Mobility: Ambulates or transfers with assistive devices or assistance; Unable to ambulate or transer.: No  Nursing Judgement: No          Lines:   Peripheral IV 04/19/25 Right;Anterior Antecubital (Active)        Opportunity for questions and clarification was provided.      Patient transported with:  O2 @ 2lpm

## 2025-04-19 NOTE — PROGRESS NOTES
TRANSFER - IN REPORT:    Verbal report received from Lisha WASHINGTON on Vinaey Hanson  being received from [facility] ED for routine progression of patient care      Report consisted of patient's Situation, Background, Assessment and   Recommendations(SBAR).     Information from the following report(s) ED Encounter Summary was reviewed with the receiving nurse.    Opportunity for questions and clarification was provided.      Assessment completed upon patient's arrival to unit and care assumed.

## 2025-04-20 VITALS
HEIGHT: 66 IN | BODY MASS INDEX: 35.08 KG/M2 | WEIGHT: 218.3 LBS | HEART RATE: 80 BPM | SYSTOLIC BLOOD PRESSURE: 115 MMHG | DIASTOLIC BLOOD PRESSURE: 63 MMHG | TEMPERATURE: 98.2 F | RESPIRATION RATE: 18 BRPM | OXYGEN SATURATION: 95 %

## 2025-04-20 PROBLEM — J96.01 ACUTE RESPIRATORY FAILURE WITH HYPOXIA (HCC): Status: RESOLVED | Noted: 2025-04-19 | Resolved: 2025-04-20

## 2025-04-20 PROBLEM — R06.00 ACUTE DYSPNEA: Status: RESOLVED | Noted: 2025-04-19 | Resolved: 2025-04-20

## 2025-04-20 LAB
ALBUMIN SERPL-MCNC: 3.1 G/DL (ref 3.5–5)
ALBUMIN/GLOB SERPL: 0.9 (ref 1.1–2.2)
ALP SERPL-CCNC: 104 U/L (ref 45–117)
ALT SERPL-CCNC: 16 U/L (ref 12–78)
ANION GAP SERPL CALC-SCNC: 7 MMOL/L (ref 2–12)
AST SERPL-CCNC: 19 U/L (ref 15–37)
BASOPHILS # BLD: 0.04 K/UL (ref 0–0.1)
BASOPHILS NFR BLD: 0.9 % (ref 0–1)
BILIRUB SERPL-MCNC: 0.6 MG/DL (ref 0.2–1)
BUN SERPL-MCNC: 11 MG/DL (ref 6–20)
BUN/CREAT SERPL: 18 (ref 12–20)
CALCIUM SERPL-MCNC: 8.9 MG/DL (ref 8.5–10.1)
CHLORIDE SERPL-SCNC: 101 MMOL/L (ref 97–108)
CO2 SERPL-SCNC: 31 MMOL/L (ref 21–32)
CREAT SERPL-MCNC: 0.61 MG/DL (ref 0.55–1.02)
CRP SERPL-MCNC: 5 MG/DL (ref 0–0.3)
DIFFERENTIAL METHOD BLD: ABNORMAL
EOSINOPHIL # BLD: 0.03 K/UL (ref 0–0.4)
EOSINOPHIL NFR BLD: 0.6 % (ref 0–7)
ERYTHROCYTE [DISTWIDTH] IN BLOOD BY AUTOMATED COUNT: 15.1 % (ref 11.5–14.5)
EST. AVERAGE GLUCOSE BLD GHB EST-MCNC: 126 MG/DL
FOLATE SERPL-MCNC: 18.3 NG/ML (ref 5–21)
GLOBULIN SER CALC-MCNC: 3.4 G/DL (ref 2–4)
GLUCOSE SERPL-MCNC: 113 MG/DL (ref 65–100)
HBA1C MFR BLD: 6 % (ref 4–5.6)
HCT VFR BLD AUTO: 38.5 % (ref 35–47)
HGB BLD-MCNC: 11.9 G/DL (ref 11.5–16)
IMM GRANULOCYTES # BLD AUTO: 0.03 K/UL (ref 0–0.04)
IMM GRANULOCYTES NFR BLD AUTO: 0.6 % (ref 0–0.5)
LYMPHOCYTES # BLD: 0.86 K/UL (ref 0.8–3.5)
LYMPHOCYTES NFR BLD: 18.3 % (ref 12–49)
MAGNESIUM SERPL-MCNC: 1.7 MG/DL (ref 1.6–2.4)
MCH RBC QN AUTO: 27.9 PG (ref 26–34)
MCHC RBC AUTO-ENTMCNC: 30.9 G/DL (ref 30–36.5)
MCV RBC AUTO: 90.4 FL (ref 80–99)
MONOCYTES # BLD: 0.51 K/UL (ref 0–1)
MONOCYTES NFR BLD: 10.9 % (ref 5–13)
NEUTS SEG # BLD: 3.23 K/UL (ref 1.8–8)
NEUTS SEG NFR BLD: 68.7 % (ref 32–75)
NRBC # BLD: 0 K/UL (ref 0–0.01)
NRBC BLD-RTO: 0 PER 100 WBC
PHOSPHATE SERPL-MCNC: 4.3 MG/DL (ref 2.6–4.7)
PLATELET # BLD AUTO: 175 K/UL (ref 150–400)
PMV BLD AUTO: 9.9 FL (ref 8.9–12.9)
POTASSIUM SERPL-SCNC: 3.4 MMOL/L (ref 3.5–5.1)
PROCALCITONIN SERPL-MCNC: 0.05 NG/ML
PROT SERPL-MCNC: 6.5 G/DL (ref 6.4–8.2)
RBC # BLD AUTO: 4.26 M/UL (ref 3.8–5.2)
SODIUM SERPL-SCNC: 139 MMOL/L (ref 136–145)
TROPONIN I SERPL HS-MCNC: 24 NG/L (ref 0–51)
TSH SERPL DL<=0.05 MIU/L-ACNC: 0.6 UIU/ML (ref 0.36–3.74)
VIT B12 SERPL-MCNC: 154 PG/ML (ref 193–986)
WBC # BLD AUTO: 4.7 K/UL (ref 3.6–11)

## 2025-04-20 PROCEDURE — 6360000002 HC RX W HCPCS: Performed by: INTERNAL MEDICINE

## 2025-04-20 PROCEDURE — 2500000003 HC RX 250 WO HCPCS: Performed by: INTERNAL MEDICINE

## 2025-04-20 PROCEDURE — 85025 COMPLETE CBC W/AUTO DIFF WBC: CPT

## 2025-04-20 PROCEDURE — 83735 ASSAY OF MAGNESIUM: CPT

## 2025-04-20 PROCEDURE — 84484 ASSAY OF TROPONIN QUANT: CPT

## 2025-04-20 PROCEDURE — 84100 ASSAY OF PHOSPHORUS: CPT

## 2025-04-20 PROCEDURE — 82607 VITAMIN B-12: CPT

## 2025-04-20 PROCEDURE — 84145 PROCALCITONIN (PCT): CPT

## 2025-04-20 PROCEDURE — 84443 ASSAY THYROID STIM HORMONE: CPT

## 2025-04-20 PROCEDURE — 83036 HEMOGLOBIN GLYCOSYLATED A1C: CPT

## 2025-04-20 PROCEDURE — 6370000000 HC RX 637 (ALT 250 FOR IP): Performed by: INTERNAL MEDICINE

## 2025-04-20 PROCEDURE — 36415 COLL VENOUS BLD VENIPUNCTURE: CPT

## 2025-04-20 PROCEDURE — 86140 C-REACTIVE PROTEIN: CPT

## 2025-04-20 PROCEDURE — 80053 COMPREHEN METABOLIC PANEL: CPT

## 2025-04-20 PROCEDURE — 82746 ASSAY OF FOLIC ACID SERUM: CPT

## 2025-04-20 RX ORDER — LEVOFLOXACIN 750 MG/1
750 TABLET, FILM COATED ORAL DAILY
Qty: 3 TABLET | Refills: 0 | Status: SHIPPED | OUTPATIENT
Start: 2025-04-21 | End: 2025-04-24

## 2025-04-20 RX ORDER — DILTIAZEM HYDROCHLORIDE 180 MG/1
180 CAPSULE, COATED, EXTENDED RELEASE ORAL DAILY
Status: DISCONTINUED | OUTPATIENT
Start: 2025-04-20 | End: 2025-04-20 | Stop reason: HOSPADM

## 2025-04-20 RX ORDER — ALBUTEROL SULFATE 90 UG/1
2 INHALANT RESPIRATORY (INHALATION) EVERY 4 HOURS PRN
Qty: 18 G | Refills: 0 | Status: SHIPPED | OUTPATIENT
Start: 2025-04-20

## 2025-04-20 RX ORDER — FUROSEMIDE 20 MG/1
20 TABLET ORAL DAILY
Qty: 3 TABLET | Refills: 0 | Status: SHIPPED | OUTPATIENT
Start: 2025-04-21 | End: 2025-04-24

## 2025-04-20 RX ORDER — CYANOCOBALAMIN 1000 UG/ML
1000 INJECTION, SOLUTION INTRAMUSCULAR; SUBCUTANEOUS DAILY
Status: DISCONTINUED | OUTPATIENT
Start: 2025-04-20 | End: 2025-04-20 | Stop reason: HOSPADM

## 2025-04-20 RX ORDER — POTASSIUM CHLORIDE 750 MG/1
40 TABLET, EXTENDED RELEASE ORAL ONCE
Status: COMPLETED | OUTPATIENT
Start: 2025-04-20 | End: 2025-04-20

## 2025-04-20 RX ORDER — CETIRIZINE HYDROCHLORIDE 10 MG/1
10 TABLET ORAL DAILY
Status: DISCONTINUED | OUTPATIENT
Start: 2025-04-20 | End: 2025-04-20 | Stop reason: HOSPADM

## 2025-04-20 RX ORDER — TROSPIUM CHLORIDE 20 MG/1
20 TABLET, FILM COATED ORAL
Status: DISCONTINUED | OUTPATIENT
Start: 2025-04-20 | End: 2025-04-20 | Stop reason: HOSPADM

## 2025-04-20 RX ORDER — FUROSEMIDE 20 MG/1
20 TABLET ORAL DAILY
Status: DISCONTINUED | OUTPATIENT
Start: 2025-04-21 | End: 2025-04-20 | Stop reason: HOSPADM

## 2025-04-20 RX ORDER — POTASSIUM CHLORIDE 1500 MG/1
20 TABLET, EXTENDED RELEASE ORAL DAILY
Qty: 3 TABLET | Refills: 0 | Status: SHIPPED | OUTPATIENT
Start: 2025-04-20 | End: 2025-04-23

## 2025-04-20 RX ORDER — LANOLIN ALCOHOL/MO/W.PET/CERES
1000 CREAM (GRAM) TOPICAL DAILY
Qty: 30 TABLET | Refills: 1 | Status: SHIPPED | OUTPATIENT
Start: 2025-04-20

## 2025-04-20 RX ADMIN — METOPROLOL TARTRATE 25 MG: 25 TABLET, FILM COATED ORAL at 08:33

## 2025-04-20 RX ADMIN — LEVOFLOXACIN 750 MG: 5 INJECTION, SOLUTION INTRAVENOUS at 00:00

## 2025-04-20 RX ADMIN — POLYETHYLENE GLYCOL 3350 17 G: 17 POWDER, FOR SOLUTION ORAL at 12:01

## 2025-04-20 RX ADMIN — ACETAMINOPHEN 650 MG: 325 TABLET ORAL at 08:39

## 2025-04-20 RX ADMIN — SODIUM CHLORIDE, PRESERVATIVE FREE 10 ML: 5 INJECTION INTRAVENOUS at 08:33

## 2025-04-20 RX ADMIN — ATORVASTATIN CALCIUM 10 MG: 10 TABLET, FILM COATED ORAL at 08:32

## 2025-04-20 RX ADMIN — SERTRALINE HYDROCHLORIDE 50 MG: 50 TABLET ORAL at 08:32

## 2025-04-20 RX ADMIN — FUROSEMIDE 40 MG: 10 INJECTION, SOLUTION INTRAMUSCULAR; INTRAVENOUS at 08:33

## 2025-04-20 RX ADMIN — LOSARTAN POTASSIUM 50 MG: 50 TABLET, FILM COATED ORAL at 08:33

## 2025-04-20 RX ADMIN — DILTIAZEM HYDROCHLORIDE 180 MG: 180 CAPSULE, EXTENDED RELEASE ORAL at 09:44

## 2025-04-20 RX ADMIN — CETIRIZINE HYDROCHLORIDE 10 MG: 10 TABLET, FILM COATED ORAL at 09:44

## 2025-04-20 RX ADMIN — CLOPIDOGREL BISULFATE 75 MG: 75 TABLET, FILM COATED ORAL at 08:32

## 2025-04-20 RX ADMIN — CYANOCOBALAMIN 1000 MCG: 1000 INJECTION, SOLUTION INTRAMUSCULAR; SUBCUTANEOUS at 08:39

## 2025-04-20 RX ADMIN — ENOXAPARIN SODIUM 40 MG: 100 INJECTION SUBCUTANEOUS at 08:33

## 2025-04-20 RX ADMIN — POTASSIUM CHLORIDE 40 MEQ: 750 TABLET, FILM COATED, EXTENDED RELEASE ORAL at 08:33

## 2025-04-20 ASSESSMENT — PAIN SCALES - GENERAL
PAINLEVEL_OUTOF10: 0
PAINLEVEL_OUTOF10: 0
PAINLEVEL_OUTOF10: 5

## 2025-04-20 ASSESSMENT — PAIN DESCRIPTION - ORIENTATION: ORIENTATION: MID

## 2025-04-20 ASSESSMENT — PAIN DESCRIPTION - DESCRIPTORS: DESCRIPTORS: ACHING

## 2025-04-20 ASSESSMENT — PAIN DESCRIPTION - LOCATION: LOCATION: GENERALIZED

## 2025-04-20 NOTE — PLAN OF CARE
Problem: Chronic Conditions and Co-morbidities  Goal: Patient's chronic conditions and co-morbidity symptoms are monitored and maintained or improved  4/20/2025 1714 by Deidra Reed RN  Outcome: Completed  4/20/2025 1108 by Deidra Reed RN  Outcome: Progressing  Flowsheets (Taken 4/20/2025 0848)  Care Plan - Patient's Chronic Conditions and Co-Morbidity Symptoms are Monitored and Maintained or Improved:   Collaborate with multidisciplinary team to address chronic and comorbid conditions and prevent exacerbation or deterioration   Monitor and assess patient's chronic conditions and comorbid symptoms for stability, deterioration, or improvement  4/20/2025 0415 by Brenna Sanchez RN  Outcome: Progressing  4/20/2025 0415 by Brenna Sanchez RN  Outcome: Progressing     Problem: Discharge Planning  Goal: Discharge to home or other facility with appropriate resources  4/20/2025 1714 by Deidra Reed RN  Outcome: Completed  4/20/2025 1108 by Deidra Reed RN  Outcome: Progressing  Flowsheets (Taken 4/20/2025 0848)  Discharge to home or other facility with appropriate resources:   Arrange for needed discharge resources and transportation as appropriate   Identify barriers to discharge with patient and caregiver  4/20/2025 0415 by Brenna Sanchez RN  Outcome: Progressing  4/20/2025 0415 by Brenna Sanchez RN  Outcome: Progressing     Problem: Safety - Adult  Goal: Free from fall injury  4/20/2025 1714 by Deidra Reed RN  Outcome: Completed  4/20/2025 1108 by Deidra Reed RN  Outcome: Progressing  Flowsheets (Taken 4/20/2025 1000)  Free From Fall Injury: Instruct family/caregiver on patient safety  4/20/2025 0415 by Brenna Sanchez RN  Outcome: Progressing  4/20/2025 0415 by Brenna Sanchez RN  Outcome: Progressing     Problem: ABCDS Injury Assessment  Goal: Absence of physical injury  4/20/2025 1714 by Deidra Reed RN  Outcome: Completed  4/20/2025 1108 by Deidra Reed RN  Outcome:

## 2025-04-20 NOTE — PROGRESS NOTES
Patient states she had an episode of vomiting after breakfast, feels it may be related to constipation. Pt requested miralax. Miralax given, pt declined nausea med.

## 2025-04-20 NOTE — PROGRESS NOTES
Pulse oximetry assessment   95% at rest on room air (if 88% or less, skip next steps)  92  % while ambulating on room air  96% at rest on 1LPM

## 2025-04-20 NOTE — DISCHARGE SUMMARY
Physician Discharge Summary     Patient ID:    Vianey Hanson  [id number]  75 y.o.  1950    Admit date: 4/19/2025    Discharge date and time: 4/20/2025 4:09 PM    Discharge condition: Stable    Admission HPI:  Vianey Hanson is a 75 y.o. female with past medical history significant for hypertension, type 2 diabetes, dyslipidemia, anxiety/depression presented at [facility] emergency room at [facility] with multiple medical complaints including cough, shortness of breath, headache and nausea.  Patient symptoms started a couple of days ago.  Patient also reported fever without chills.  Patient took Tylenol prior to coming to the emergency room without any significant relief.  She tested negative for COVID at her skilled nursing facility.  Patient was last admitted to this hospital from 9/29/2024 to 10/1/2024, she was admitted and treated for colitis.  Chest x-ray in the emergency room was negative.  CTA of the chest was negative for PE but did demonstrate multifocal pneumonia.  Patient was also hypoxic and required being placed on supplemental oxygen.  She was not on oxygen prior to this.  She was started on Rocephin and Zithromax and referred to the hospitalist service for admission      Hospital Diagnoses and Treatment Rendered:   Acute respiratory failure with hypoxia - resolved  Multifocal pna  Dyspnea  Elevated BNP w/o clinical e/o CHF  -cont abx course  -no h/o CHF  -appreciate cardiology, no e/o CHF on exam, could have mild diastolic HF; recommend echo as OP; f/up cardiologist; lasix added if needed  -CTA chest negative for PE; Follow-up chest CT in 6 weeks to evaluate for resolution of nodular densities recommended  -pt has cardiology appt in 3 days    Essential hypertension cont home meds    Type 2 diabetes A1c 6.0    Dyslipidemia on Lipitor     Mood disorder cont home meds    Hypokalemia, repleted    B12 deficiency, start replacement      Pending results: none    Follow up Care:   MG tablet  Commonly known as: ZOLOFT  TAKE ONE TABLET BY MOUTH ONE TIME DAILY     simvastatin 10 MG tablet  Commonly known as: ZOCOR  TAKE ONE TABLET BY MOUTH EVERY EVENING            ASK your doctor about these medications      triamcinolone 55 MCG/ACT nasal inhaler  Commonly known as: Nasacort Allergy 24HR  2 sprays by Each Nostril route daily               Where to Get Your Medications        These medications were sent to [facility]/pharmacy #[id number] - [place], [place] - [address] - P [phone] - F [phone] [address] [address]      Phone: [phone]   albuterol sulfate  (90 Base) MCG/ACT inhaler  furosemide 20 MG tablet  levoFLOXacin 750 MG tablet  potassium chloride 20 MEQ extended release tablet  vitamin B-12 1000 MCG tablet         Chronic Diagnoses:   Principal Problem (Resolved):    Acute respiratory failure with hypoxia  Active Problems:    * No active hospital problems. *  Resolved Problems:    Acute dyspnea      Code Status: Full Code No additional code details    Discharge Exam:  General:  Alert, cooperative, no distress  Lungs:   Clear to auscultation bilaterally, symmetric expansion, no respiratory distress  Heart:   Regular rate and rhythm, no audible murmur  Abdomen:   Soft, non-tender, non-distended  Extremities: No cyanosis or edema  Neurologic: CHUNG. A&O    CONSULTATIONS: IP CONSULT TO CARDIOLOGY    Significant Diagnostic Studies:   4/19/2025: BUN 11 MG/DL (Ref range: 6 - 20 MG/DL); CO2 31 mmol/L (Ref range: 21 - 32 mmol/L); Hematocrit 37.4 % (Ref range: 35.0 - 47.0 %); Hemoglobin 12.1 g/dL (Ref range: 11.5 - 16.0 g/dL); Potassium 3.7 mmol/L (Ref range: 3.5 - 5.1 mmol/L); Sodium 139 mmol/L (Ref range: 136 - 145 mmol/L)  4/20/2025: BUN 11 MG/DL (Ref range: 6 - 20 MG/DL); CO2 31 mmol/L (Ref range: 21 - 32 mmol/L); Hematocrit 38.5 % (Ref range: 35.0 - 47.0 %); Hemoglobin 11.9 g/dL (Ref range: 11.5 - 16.0 g/dL); Potassium 3.4 mmol/L (L; Ref range: 3.5 - 5.1 mmol/L);

## 2025-04-20 NOTE — CONSULTS
education level: Not on file   Occupational History    Occupation: Retired  provider   Tobacco Use    Smoking status: Never     Passive exposure: Never    Smokeless tobacco: Never   Vaping Use    Vaping status: Never Used   Substance and Sexual Activity    Alcohol use: Yes     Alcohol/week: 1.7 standard drinks of alcohol    Drug use: No    Sexual activity: Not Currently   Other Topics Concern    Not on file   Social History Narrative        Spouse      3 children      Retired  provider and staff at [employer]    Patient has an AMD      Currently lives independently in her home alone    In the process of selling her home and moving into [facility]        Diet: nothing special right now but still leans more toward Mediterranean Diet since ; not a lot of sweets      Caffeine: 1 cup of coffee qam, no tea, occ soda      Exercise: none since ~ ; used to walk almost daily x 30 minutes and plans to restart regularly, still tries to stay active in her yard & around her home, enjoys the pool but doesn't go, has [place] membership but hasn't gone in a while    Weight: down from 255 lbs in 2019 to the 220's and maintaining in that range the past few years        Social Drivers of Health     Financial Resource Strain: Low Risk  (2024)    Overall Financial Resource Strain (CARDIA)     Difficulty of Paying Living Expenses: Not hard at all   Food Insecurity: No Food Insecurity (2025)    Hunger Vital Sign     Worried About Running Out of Food in the Last Year: Never true     Ran Out of Food in the Last Year: Never true   Transportation Needs: No Transportation Needs (2025)    PRAPARE - Transportation     Lack of Transportation (Medical): No     Lack of Transportation (Non-Medical): No   Physical Activity: Insufficiently Active (2024)    Exercise Vital Sign     Days of Exercise per Week: 3 days     Minutes of Exercise per Session: 30 min   Stress:  input(s): \"FE\", \"PSAT\", \"FERR\"   No results found for: \"GLUCPOC\"    Lab Results   Component Value Date/Time    CHOL 124 04/29/2024 10:10 AM    HDL 47 04/29/2024 10:10 AM    LDL 55.6 04/29/2024 10:10 AM    VLDL 21.4 04/29/2024 10:10 AM        Recent Results (from the past 24 hours)   Troponin    Collection Time: 04/19/25  8:17 PM   Result Value Ref Range    Troponin, High Sensitivity 22 0 - 51 ng/L   POCT Glucose    Collection Time: 04/19/25  8:56 PM   Result Value Ref Range    POC Glucose 143 (H) 65 - 117 mg/dL    Performed by: ALAN Berg    CBC with Auto Differential    Collection Time: 04/20/25  4:38 AM   Result Value Ref Range    WBC 4.7 3.6 - 11.0 K/uL    RBC 4.26 3.80 - 5.20 M/uL    Hemoglobin 11.9 11.5 - 16.0 g/dL    Hematocrit 38.5 35.0 - 47.0 %    MCV 90.4 80.0 - 99.0 FL    MCH 27.9 26.0 - 34.0 PG    MCHC 30.9 30.0 - 36.5 g/dL    RDW 15.1 (H) 11.5 - 14.5 %    Platelets 175 150 - 400 K/uL    MPV 9.9 8.9 - 12.9 FL    Nucleated RBCs 0.0 0  WBC    nRBC 0.00 0.00 - 0.01 K/uL    Neutrophils % 68.7 32.0 - 75.0 %    Lymphocytes % 18.3 12.0 - 49.0 %    Monocytes % 10.9 5.0 - 13.0 %    Eosinophils % 0.6 0.0 - 7.0 %    Basophils % 0.9 0.0 - 1.0 %    Immature Granulocytes % 0.6 (H) 0.0 - 0.5 %    Neutrophils Absolute 3.23 1.80 - 8.00 K/UL    Lymphocytes Absolute 0.86 0.80 - 3.50 K/UL    Monocytes Absolute 0.51 0.00 - 1.00 K/UL    Eosinophils Absolute 0.03 0.00 - 0.40 K/UL    Basophils Absolute 0.04 0.00 - 0.10 K/UL    Immature Granulocytes Absolute 0.03 0.00 - 0.04 K/UL    Differential Type AUTOMATED     Comprehensive Metabolic Panel    Collection Time: 04/20/25  4:38 AM   Result Value Ref Range    Sodium 139 136 - 145 mmol/L    Potassium 3.4 (L) 3.5 - 5.1 mmol/L    Chloride 101 97 - 108 mmol/L    CO2 31 21 - 32 mmol/L    Anion Gap 7 2 - 12 mmol/L    Glucose 113 (H) 65 - 100 mg/dL    BUN 11 6 - 20 MG/DL    Creatinine 0.61 0.55 - 1.02 MG/DL    BUN/Creatinine Ratio 18 12 - 20      Est, Glom Filt Rate >90 >60

## 2025-04-20 NOTE — PLAN OF CARE
Problem: Chronic Conditions and Co-morbidities  Goal: Patient's chronic conditions and co-morbidity symptoms are monitored and maintained or improved  4/20/2025 1108 by Deidra Reed RN  Outcome: Progressing  Flowsheets (Taken 4/20/2025 0848)  Care Plan - Patient's Chronic Conditions and Co-Morbidity Symptoms are Monitored and Maintained or Improved:   Collaborate with multidisciplinary team to address chronic and comorbid conditions and prevent exacerbation or deterioration   Monitor and assess patient's chronic conditions and comorbid symptoms for stability, deterioration, or improvement  4/20/2025 0415 by Brenna Sanchez RN  Outcome: Progressing  4/20/2025 0415 by Brenna Sanchez RN  Outcome: Progressing     Problem: Discharge Planning  Goal: Discharge to home or other facility with appropriate resources  4/20/2025 1108 by Diedra Reed RN  Outcome: Progressing  Flowsheets (Taken 4/20/2025 0848)  Discharge to home or other facility with appropriate resources:   Arrange for needed discharge resources and transportation as appropriate   Identify barriers to discharge with patient and caregiver  4/20/2025 0415 by Brenna Sanchez RN  Outcome: Progressing  4/20/2025 0415 by Brenna Sanchez RN  Outcome: Progressing     Problem: Safety - Adult  Goal: Free from fall injury  4/20/2025 1108 by Deidra Reed RN  Outcome: Progressing  Flowsheets (Taken 4/20/2025 1000)  Free From Fall Injury: Instruct family/caregiver on patient safety  4/20/2025 0415 by Brenna Sanchez RN  Outcome: Progressing  4/20/2025 0415 by Brenna Sanchez RN  Outcome: Progressing     Problem: ABCDS Injury Assessment  Goal: Absence of physical injury  4/20/2025 1108 by Deidra Reed RN  Outcome: Progressing  Flowsheets (Taken 4/20/2025 1000)  Absence of Physical Injury: Implement safety measures based on patient assessment  4/20/2025 0415 by Brenna Sanchez RN  Outcome: Progressing  4/20/2025 0415 by Brenna Sanchez RN  Outcome: Progressing

## 2025-04-28 ENCOUNTER — OFFICE VISIT (OUTPATIENT)
Age: 75
End: 2025-04-28

## 2025-04-28 VITALS
HEIGHT: 66 IN | TEMPERATURE: 98.7 F | DIASTOLIC BLOOD PRESSURE: 80 MMHG | OXYGEN SATURATION: 96 % | SYSTOLIC BLOOD PRESSURE: 138 MMHG | WEIGHT: 217.2 LBS | BODY MASS INDEX: 34.91 KG/M2 | RESPIRATION RATE: 16 BRPM | HEART RATE: 69 BPM

## 2025-04-28 DIAGNOSIS — E78.00 HYPERCHOLESTEROLEMIA: ICD-10-CM

## 2025-04-28 DIAGNOSIS — R05.3 PERSISTENT COUGH: ICD-10-CM

## 2025-04-28 DIAGNOSIS — I50.31 ACUTE DIASTOLIC HEART FAILURE (HCC): ICD-10-CM

## 2025-04-28 DIAGNOSIS — Z86.73 HISTORY OF TIA (TRANSIENT ISCHEMIC ATTACK): ICD-10-CM

## 2025-04-28 DIAGNOSIS — E11.9 DIABETES MELLITUS TYPE 2, NONINSULIN DEPENDENT (HCC): ICD-10-CM

## 2025-04-28 DIAGNOSIS — Z09 HOSPITAL DISCHARGE FOLLOW-UP: Primary | ICD-10-CM

## 2025-04-28 DIAGNOSIS — E53.8 B12 DEFICIENCY: ICD-10-CM

## 2025-04-28 DIAGNOSIS — I10 BENIGN ESSENTIAL HYPERTENSION: ICD-10-CM

## 2025-04-28 DIAGNOSIS — Z86.79 HISTORY OF SUPRAVENTRICULAR TACHYCARDIA: ICD-10-CM

## 2025-04-28 DIAGNOSIS — R77.8 ABNORMAL SERUM PROTEIN ELECTROPHORESIS: ICD-10-CM

## 2025-04-28 DIAGNOSIS — J18.9 MULTIFOCAL PNEUMONIA: ICD-10-CM

## 2025-04-28 DIAGNOSIS — J96.01 ACUTE RESPIRATORY FAILURE WITH HYPOXIA (HCC): ICD-10-CM

## 2025-04-28 RX ORDER — BENZONATATE 100 MG/1
100 CAPSULE ORAL 3 TIMES DAILY PRN
Qty: 30 CAPSULE | Refills: 0 | Status: SHIPPED | OUTPATIENT
Start: 2025-04-28 | End: 2025-05-08

## 2025-04-28 ASSESSMENT — ENCOUNTER SYMPTOMS
GASTROINTESTINAL NEGATIVE: 1
COUGH: 1
WHEEZING: 1
CHEST TIGHTNESS: 0

## 2025-04-28 NOTE — PROGRESS NOTES
Chief Complaint   Patient presents with    Follow-Up from Hospital     [facility] 4/19/25-4/20/25    Care Transitions    Pneumonia     HISTORY OF PRESENT ILLNESS   HPI  Patient is seen today for Transitions of Care services following hospital discharge from [facility] 4/20/25 for pneumonia. Patient accompanied today by her best friend who is a retired nurse.   Nurse Navigator/Case Management Coordinator performed an outreach to patient within 2 business days of discharge to complete medication reconciliation and a telephonic assessment of patient's condition.   Today's history obtained by:patient self report, friend and per review of pertinent hospital records, reports, discharge summary, applicable consultant reports, available outside records, labs, and relevant imaging studies. Pertinent findings summarized in today's office note.    Patient presented to [place] ER on 4/19/25 brought in by her son for 2-3 day history of progressively worsening weakness, cough, sob, headache, nausea, low grade fever 99. No chest pain, abdominal pain, N/V/D. In the ER her BP and pulse were normal. Temp was 97.2 having had Tylenol prior to arrival. Oxygen saturations were 89-93% on room air and corrected to % on 2L via NC. Exam was notable for wheezing in the right middle and lower lung fields. Wheezing improved after being given Duoneb but patient felt no clinical improvement. CXR was negative. Flu and Covid were negative. WBC and Hgb were normal. UA was negative for UTI. Renal and hepatic function normal. EKG sinus rhythm w/ rate 80 and otherwise non-acute. Chest CTA was negative for pulmonary embolism and aortic aneurysm.  Nodular densities were identified consistent with mild multifocal pneumonia in the right lower and middle lobes.  She was started on Rocephin and Zithromax in the ER. Pro-BNP was elevated at 1761. It was therefore recommended that she be transferred to [facility] for

## 2025-04-28 NOTE — PROGRESS NOTES
Physician Progress Note      PATIENT:               SAAD ROQUE  CSN #:                  [id number]  :                       1950  ADMIT DATE:       2025 11:38 AM  DISCH DATE:        2025 5:35 PM  RESPONDING  PROVIDER #:        Juliana Kaur MD          QUERY TEXT:    Acute respiratory failure with hypoxia is documented in the medical record in    H&P,  Cardiology consult and  DC summary. Please provide   additional clinical indicators supportive of your documentation. Or please   document if the diagnosis of acute respiratory failure with hypoxia has been   ruled out after study.    The clinical indicators include:  from  ED Provider Note: \"Pulmonary: Effort: Pulmonary effort is normal.   Breath sounds: Examination of the right-middle field reveals wheezing.   Examination of the right-lower field reveals wheezing. Wheezing present.\"     H&P: \"Chest: Bilateral basal crackles and few expiratory wheezing\";   \"Acute respiratory failure with hypoxia POA\"     Cardiology consult note: \"LUNGS: Coarse BS right posterior lung field,   minimal inspiratory crackles left lung base that clear mostly with deep   inspiration\"; \"1. Acute respiratory failure with hypoxemia a. Likely due to   PNA and possible component of diastolic CHF b. Wean oxygen as tolerated\"     DC Summary: \"Lungs: Clear to auscultation bilaterally, symmetric   expansion, no respiratory distress\"; \"Acute respiratory failure with hypoxia\"     Chest Xray: No acute intrathoracic process    : Chest CTA: There is no pulmonary embolism. There is no aortic aneurysm.   Mild multifocal pneumonia right lower lobe and right middle lobe.  Follow-up chest CT in 6 weeks to evaluate for resolution of nodular densities   recommended.     VS 11:30--HR 74, RR 16, RA SpO2 93%     VS 14:33 RA SpO2 89%     VS 16:01 HR 70, RR 18, SpO2 92% on 2 liters          Cardiology consult, Chest Xray, Chest

## 2025-04-28 NOTE — PROGRESS NOTES
Chief Complaint   Patient presents with    Follow-Up from Hospital     1. \"Have you been to the ER, urgent care clinic since your last visit?  Hospitalized since your last visit?\" ER [facility]'s    2. \"Have you seen or consulted any other health care providers outside of the [facility] since your last visit?\" Cardiologist Dr Casillas, DM [place].  Uro gyn Dr Agustin,     3. For patients aged 45-75: Has the patient had a colonoscopy / FIT/ Cologuard?  Colonoscopy Dr Ledezma @ [facility] 11/25/24       If the patient is female:    4. For patients aged 40-74: Has the patient had a mammogram within the past 2 years? 10/2024       5. For patients aged 21-65: Has the patient had a pap smear? NA - based on age or sex

## 2025-04-30 NOTE — TELEPHONE ENCOUNTER
LOV 4/28/25 for hospital f/u.  Fasting labs done on 4/29/24.  Last rx'd 5/11/24 zocor #90 +1R, lopressor 5/11/24 #180 +3R    Due f/u in 2-3 months

## 2025-05-01 RX ORDER — SIMVASTATIN 10 MG
10 TABLET ORAL NIGHTLY
Qty: 90 TABLET | Refills: 0 | Status: SHIPPED | OUTPATIENT
Start: 2025-05-01

## 2025-05-01 RX ORDER — METOPROLOL TARTRATE 25 MG/1
25 TABLET, FILM COATED ORAL EVERY 12 HOURS
Qty: 180 TABLET | Refills: 0 | Status: SHIPPED | OUTPATIENT
Start: 2025-05-01

## 2025-05-11 ENCOUNTER — HOSPITAL ENCOUNTER (EMERGENCY)
Facility: HOSPITAL | Age: 75
Discharge: HOME | End: 2025-05-11
Attending: EMERGENCY MEDICINE
Payer: MEDICARE

## 2025-05-11 VITALS
HEIGHT: 65 IN | HEART RATE: 75 BPM | BODY MASS INDEX: 36.69 KG/M2 | OXYGEN SATURATION: 96 % | SYSTOLIC BLOOD PRESSURE: 153 MMHG | TEMPERATURE: 98.2 F | RESPIRATION RATE: 18 BRPM | DIASTOLIC BLOOD PRESSURE: 76 MMHG | WEIGHT: 220.24 LBS

## 2025-05-11 DIAGNOSIS — L03.115 CELLULITIS OF RIGHT LOWER EXTREMITY: Primary | ICD-10-CM

## 2025-05-11 DIAGNOSIS — T24.211A PARTIAL THICKNESS BURN OF RIGHT THIGH, INITIAL ENCOUNTER: ICD-10-CM

## 2025-05-11 PROCEDURE — 6360000002 HC RX W HCPCS: Performed by: EMERGENCY MEDICINE

## 2025-05-11 PROCEDURE — 6370000000 HC RX 637 (ALT 250 FOR IP): Performed by: EMERGENCY MEDICINE

## 2025-05-11 PROCEDURE — 99284 EMERGENCY DEPT VISIT MOD MDM: CPT

## 2025-05-11 PROCEDURE — 90471 IMMUNIZATION ADMIN: CPT | Performed by: EMERGENCY MEDICINE

## 2025-05-11 PROCEDURE — 90714 TD VACC NO PRESV 7 YRS+ IM: CPT | Performed by: EMERGENCY MEDICINE

## 2025-05-11 RX ORDER — DOXYCYCLINE HYCLATE 100 MG
100 TABLET ORAL ONCE
Status: COMPLETED | OUTPATIENT
Start: 2025-05-11 | End: 2025-05-11

## 2025-05-11 RX ORDER — DOXYCYCLINE HYCLATE 100 MG
100 TABLET ORAL 2 TIMES DAILY
Qty: 20 TABLET | Refills: 0 | Status: SHIPPED | OUTPATIENT
Start: 2025-05-11 | End: 2025-05-21

## 2025-05-11 RX ADMIN — CLOSTRIDIUM TETANI TOXOID ANTIGEN (FORMALDEHYDE INACTIVATED) AND CORYNEBACTERIUM DIPHTHERIAE TOXOID ANTIGEN (FORMALDEHYDE INACTIVATED) 0.5 ML: 5; 2 INJECTION, SUSPENSION INTRAMUSCULAR at 20:51

## 2025-05-11 RX ADMIN — DOXYCYCLINE HYCLATE 100 MG: 100 TABLET, COATED ORAL at 20:50

## 2025-05-11 ASSESSMENT — PAIN - FUNCTIONAL ASSESSMENT: PAIN_FUNCTIONAL_ASSESSMENT: NONE - DENIES PAIN
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
## 2025-05-16 ENCOUNTER — TELEPHONE (OUTPATIENT)
Age: 75
End: 2025-05-16

## 2025-05-16 NOTE — TELEPHONE ENCOUNTER
----- Message from SANKET FOREMAN MA sent at 5/14/2025  8:34 AM EDT -----  Regarding: FW: [facility] Appointment Request    ----- Message -----  From: Bisi Jose Miguel Chris Cervantes  Sent: 5/13/2025  10:13 AM EDT  To: rIon [facility] Clinical Staff  Subject: [facility] Appointment Request                          [facility] Appointment Request    Patient needs appointment for [facility] Appointment Type: ED Follow-Up.    Patient Requested Dates(s):This week  Patient Requested Time:Anytime will do  Provider Name:Shelby Dunn MD or any Nurse practitioner      Reason for Appointment Request: Established Patient - Available appointments did not meet patient need  --------------------------------------------------------------------------------------------------------------------------    Relationship to Patient: Self     Call Back Information: OK to leave message on voicemail  Preferred Call Back Number: Phone [phone]
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
## 2025-05-20 ENCOUNTER — OFFICE VISIT (OUTPATIENT)
Age: 75
End: 2025-05-20
Payer: MEDICARE

## 2025-05-20 VITALS
WEIGHT: 221 LBS | HEIGHT: 65 IN | RESPIRATION RATE: 16 BRPM | DIASTOLIC BLOOD PRESSURE: 66 MMHG | TEMPERATURE: 97.7 F | BODY MASS INDEX: 36.82 KG/M2 | HEART RATE: 75 BPM | SYSTOLIC BLOOD PRESSURE: 111 MMHG | OXYGEN SATURATION: 96 %

## 2025-05-20 DIAGNOSIS — T21.32XA FULL THICKNESS BURN OF ABDOMINAL WALL, INITIAL ENCOUNTER: ICD-10-CM

## 2025-05-20 DIAGNOSIS — E53.8 B12 DEFICIENCY: Primary | ICD-10-CM

## 2025-05-20 PROCEDURE — G8399 PT W/DXA RESULTS DOCUMENT: HCPCS | Performed by: NURSE PRACTITIONER

## 2025-05-20 PROCEDURE — 1159F MED LIST DOCD IN RCRD: CPT | Performed by: NURSE PRACTITIONER

## 2025-05-20 PROCEDURE — G8417 CALC BMI ABV UP PARAM F/U: HCPCS | Performed by: NURSE PRACTITIONER

## 2025-05-20 PROCEDURE — 1036F TOBACCO NON-USER: CPT | Performed by: NURSE PRACTITIONER

## 2025-05-20 PROCEDURE — 1125F AMNT PAIN NOTED PAIN PRSNT: CPT | Performed by: NURSE PRACTITIONER

## 2025-05-20 PROCEDURE — 1111F DSCHRG MED/CURRENT MED MERGE: CPT | Performed by: NURSE PRACTITIONER

## 2025-05-20 PROCEDURE — 99214 OFFICE O/P EST MOD 30 MIN: CPT | Performed by: NURSE PRACTITIONER

## 2025-05-20 PROCEDURE — 1123F ACP DISCUSS/DSCN MKR DOCD: CPT | Performed by: NURSE PRACTITIONER

## 2025-05-20 PROCEDURE — 3074F SYST BP LT 130 MM HG: CPT | Performed by: NURSE PRACTITIONER

## 2025-05-20 PROCEDURE — G8427 DOCREV CUR MEDS BY ELIG CLIN: HCPCS | Performed by: NURSE PRACTITIONER

## 2025-05-20 PROCEDURE — 1090F PRES/ABSN URINE INCON ASSESS: CPT | Performed by: NURSE PRACTITIONER

## 2025-05-20 PROCEDURE — 3078F DIAST BP <80 MM HG: CPT | Performed by: NURSE PRACTITIONER

## 2025-05-20 RX ORDER — MUPIROCIN 20 MG/G
OINTMENT TOPICAL
Qty: 15 G | Refills: 0 | Status: SHIPPED | OUTPATIENT
Start: 2025-05-20 | End: 2025-05-27

## 2025-05-20 RX ORDER — LANOLIN ALCOHOL/MO/W.PET/CERES
1000 CREAM (GRAM) TOPICAL DAILY
Qty: 90 TABLET | Refills: 3 | Status: SHIPPED | OUTPATIENT
Start: 2025-05-20

## 2025-05-20 ASSESSMENT — PATIENT HEALTH QUESTIONNAIRE - PHQ9
2. FEELING DOWN, DEPRESSED OR HOPELESS: NOT AT ALL
SUM OF ALL RESPONSES TO PHQ QUESTIONS 1-9: 0
1. LITTLE INTEREST OR PLEASURE IN DOING THINGS: NOT AT ALL
SUM OF ALL RESPONSES TO PHQ QUESTIONS 1-9: 0
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
## 2025-05-21 ASSESSMENT — ENCOUNTER SYMPTOMS: SHORTNESS OF BREATH: 0
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
## 2025-05-28 ENCOUNTER — TELEPHONE (OUTPATIENT)
Age: 75
End: 2025-05-28
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
## 2025-06-02 ENCOUNTER — OFFICE VISIT (OUTPATIENT)
Age: 75
End: 2025-06-02
Payer: MEDICARE

## 2025-06-02 ENCOUNTER — HOSPITAL ENCOUNTER (OUTPATIENT)
Facility: HOSPITAL | Age: 75
Discharge: HOME | End: 2025-06-05
Attending: FAMILY MEDICINE
Payer: MEDICARE

## 2025-06-02 VITALS
SYSTOLIC BLOOD PRESSURE: 111 MMHG | BODY MASS INDEX: 36.15 KG/M2 | HEART RATE: 73 BPM | RESPIRATION RATE: 18 BRPM | WEIGHT: 217 LBS | TEMPERATURE: 97.7 F | OXYGEN SATURATION: 96 % | DIASTOLIC BLOOD PRESSURE: 71 MMHG | HEIGHT: 65 IN

## 2025-06-02 DIAGNOSIS — R30.0 BURNING WITH URINATION: ICD-10-CM

## 2025-06-02 DIAGNOSIS — J18.9 MULTIFOCAL PNEUMONIA: ICD-10-CM

## 2025-06-02 DIAGNOSIS — E11.9 DIABETES MELLITUS TYPE 2, NONINSULIN DEPENDENT (HCC): ICD-10-CM

## 2025-06-02 DIAGNOSIS — Z09 HOSPITAL DISCHARGE FOLLOW-UP: Primary | ICD-10-CM

## 2025-06-02 DIAGNOSIS — Z86.79 HISTORY OF SUPRAVENTRICULAR TACHYCARDIA: ICD-10-CM

## 2025-06-02 DIAGNOSIS — K52.9 CHRONIC DIARRHEA: ICD-10-CM

## 2025-06-02 DIAGNOSIS — R35.0 URINARY FREQUENCY: ICD-10-CM

## 2025-06-02 DIAGNOSIS — D47.2 MGUS (MONOCLONAL GAMMOPATHY OF UNKNOWN SIGNIFICANCE): ICD-10-CM

## 2025-06-02 DIAGNOSIS — Z86.73 HISTORY OF TIA (TRANSIENT ISCHEMIC ATTACK): ICD-10-CM

## 2025-06-02 DIAGNOSIS — N32.81 OVERACTIVE BLADDER: ICD-10-CM

## 2025-06-02 DIAGNOSIS — E78.00 HYPERCHOLESTEROLEMIA: ICD-10-CM

## 2025-06-02 DIAGNOSIS — I10 BENIGN ESSENTIAL HYPERTENSION: ICD-10-CM

## 2025-06-02 DIAGNOSIS — T24.211D PARTIAL THICKNESS BURN OF RIGHT THIGH, SUBSEQUENT ENCOUNTER: ICD-10-CM

## 2025-06-02 PROCEDURE — 71260 CT THORAX DX C+: CPT

## 2025-06-02 PROCEDURE — 1090F PRES/ABSN URINE INCON ASSESS: CPT | Performed by: FAMILY MEDICINE

## 2025-06-02 PROCEDURE — 1160F RVW MEDS BY RX/DR IN RCRD: CPT | Performed by: FAMILY MEDICINE

## 2025-06-02 PROCEDURE — 1123F ACP DISCUSS/DSCN MKR DOCD: CPT | Performed by: FAMILY MEDICINE

## 2025-06-02 PROCEDURE — 99215 OFFICE O/P EST HI 40 MIN: CPT | Performed by: FAMILY MEDICINE

## 2025-06-02 PROCEDURE — 3044F HG A1C LEVEL LT 7.0%: CPT | Performed by: FAMILY MEDICINE

## 2025-06-02 PROCEDURE — 1036F TOBACCO NON-USER: CPT | Performed by: FAMILY MEDICINE

## 2025-06-02 PROCEDURE — 1159F MED LIST DOCD IN RCRD: CPT | Performed by: FAMILY MEDICINE

## 2025-06-02 PROCEDURE — G8399 PT W/DXA RESULTS DOCUMENT: HCPCS | Performed by: FAMILY MEDICINE

## 2025-06-02 PROCEDURE — G8427 DOCREV CUR MEDS BY ELIG CLIN: HCPCS | Performed by: FAMILY MEDICINE

## 2025-06-02 PROCEDURE — 3074F SYST BP LT 130 MM HG: CPT | Performed by: FAMILY MEDICINE

## 2025-06-02 PROCEDURE — G2211 COMPLEX E/M VISIT ADD ON: HCPCS | Performed by: FAMILY MEDICINE

## 2025-06-02 PROCEDURE — 3078F DIAST BP <80 MM HG: CPT | Performed by: FAMILY MEDICINE

## 2025-06-02 PROCEDURE — G8417 CALC BMI ABV UP PARAM F/U: HCPCS | Performed by: FAMILY MEDICINE

## 2025-06-02 PROCEDURE — 6360000004 HC RX CONTRAST MEDICATION: Performed by: FAMILY MEDICINE

## 2025-06-02 RX ORDER — IOPAMIDOL 612 MG/ML
100 INJECTION, SOLUTION INTRAVASCULAR
Status: COMPLETED | OUTPATIENT
Start: 2025-06-02 | End: 2025-06-02

## 2025-06-02 RX ADMIN — IOPAMIDOL 100 ML: 612 INJECTION, SOLUTION INTRAVENOUS at 07:52

## 2025-06-02 ASSESSMENT — ENCOUNTER SYMPTOMS
DIARRHEA: 0
BLOOD IN STOOL: 0
RESPIRATORY NEGATIVE: 1
VOMITING: 0
NAUSEA: 0
CONSTIPATION: 0
ABDOMINAL PAIN: 0
GASTROINTESTINAL NEGATIVE: 1
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
## 2025-06-02 NOTE — PROGRESS NOTES
Chief Complaint   Patient presents with    ER Follow Up     [facility] 5/11/25 Burn right thigh    Follow Up Pneunomia    Urinary Frequency     HISTORY OF PRESENT ILLNESS   Patient with history of Benign Essential HTN, Type 2 NIDDM, Hypercholesterolemia, TIA, SVT, BMI >30, GERD, Chronic Diarrhea, OA, OAB, Recurrent Cystitis presents for 2nd follow up partial thickness burn of right leg initially evaluated and treated at [facility] ER 5/11 and follow up care per NP outpatient on 5/20. She also presents to address various other concerns detailed below.     She sustained a burn on her right anterior thigh after accidentally spilling a bowl of hot soup onto her lap on 5/6/2025.  She was wearing jeans but the hot liquid seeped through her pants.  She removed the pants quickly and noticed redness to the right anterior thigh.  She applied cool wet compresses, liquid alloy, and Vaseline.  She had a nurse at her living facility check the area.  It was cleansed and she was advised to apply Vaseline alternating with Neosporin.  She continue to do so but the area blistered up after about the 2nd or 3rd day.  She eventually presented to the ER on 5/11.  The area did not require any debridement.  She was placed on oral doxycycline and given a tetanus booster.  She presented for initial follow-up here and was seen by nurse practitioner Linda on 5/20 at which time she was started on Bactroban ointment which she applied twice daily up through 5/31/2025.  She reports that the area is much better and approximately 98% healed.  She has remained afebrile.  She is currently cleaning it twice a day, applying alloy and a dry gauze.  There is no drainage, purulent exudate, redness, itching, pain, fevers, chills, sweats.    Also I had seen her back on 4/28 for hospital follow-up from [facility] 4/20/2025 for multifocal pneumonia.  Chest CTA was negative for pulmonary embolism but revealed nodular densities consistent with mild

## 2025-06-03 ENCOUNTER — RESULTS FOLLOW-UP (OUTPATIENT)
Age: 75
End: 2025-06-03

## 2025-06-03 LAB
APPEARANCE UR: CLEAR
BACTERIA URNS QL MICRO: NEGATIVE /HPF
BILIRUB UR QL: NEGATIVE
COLOR UR: ABNORMAL
EPITH CASTS URNS QL MICRO: ABNORMAL /LPF
GLUCOSE UR STRIP.AUTO-MCNC: NEGATIVE MG/DL
HGB UR QL STRIP: NEGATIVE
HYALINE CASTS URNS QL MICRO: ABNORMAL /LPF (ref 0–5)
KETONES UR QL STRIP.AUTO: NEGATIVE MG/DL
LEUKOCYTE ESTERASE UR QL STRIP.AUTO: NEGATIVE
NITRITE UR QL STRIP.AUTO: NEGATIVE
PH UR STRIP: 5 (ref 5–8)
PROT UR STRIP-MCNC: NEGATIVE MG/DL
RBC #/AREA URNS HPF: ABNORMAL /HPF (ref 0–5)
SP GR UR REFRACTOMETRY: >1.03 (ref 1–1.03)
URINE CULTURE IF INDICATED: ABNORMAL
UROBILINOGEN UR QL STRIP.AUTO: 0.2 EU/DL (ref 0.2–1)
WBC URNS QL MICRO: ABNORMAL /HPF (ref 0–4)

## 2025-06-06 ENCOUNTER — TELEPHONE (OUTPATIENT)
Age: 75
End: 2025-06-06

## 2025-06-06 NOTE — TELEPHONE ENCOUNTER
Dr. Mccullough from [facility] called requesting a call back from Dr. Dunn to alk about PT's recent scans. #[phone]

## 2025-06-07 ENCOUNTER — RESULTS FOLLOW-UP (OUTPATIENT)
Age: 75
End: 2025-06-07

## 2025-06-09 NOTE — TELEPHONE ENCOUNTER
Returned call to Dr. Hilda Mccullough who states she is completing her fellowship at [facility] and wanted to just update me on some incidental findings.  She has been following patient for chronic diarrhea.  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  Patient is being followed there.  She also was found to have a lesion in the right latissimus dorsi muscle.  Follow-up MRI was recommended but patient cannot have that due to an implantable device.  Dr. Mccullough therefore ordered an ultrasound which was nondiagnostic.  She subsequently referred her to a specialist for biopsy under imaging and that is being done at [facility]. She will keep me posted on those findings as well. Patient already scheduled to see me for her Medicare AWV and had already messaged me about these updates as well. Appreciative of Dr. Mccullough's call and updates.

## 2025-06-15 SDOH — HEALTH STABILITY: PHYSICAL HEALTH: ON AVERAGE, HOW MANY DAYS PER WEEK DO YOU ENGAGE IN MODERATE TO STRENUOUS EXERCISE (LIKE A BRISK WALK)?: 3 DAYS

## 2025-06-15 SDOH — HEALTH STABILITY: PHYSICAL HEALTH: ON AVERAGE, HOW MANY MINUTES DO YOU ENGAGE IN EXERCISE AT THIS LEVEL?: 30 MIN

## 2025-06-15 ASSESSMENT — PATIENT HEALTH QUESTIONNAIRE - PHQ9
SUM OF ALL RESPONSES TO PHQ QUESTIONS 1-9: 0
SUM OF ALL RESPONSES TO PHQ QUESTIONS 1-9: 0
1. LITTLE INTEREST OR PLEASURE IN DOING THINGS: NOT AT ALL
SUM OF ALL RESPONSES TO PHQ QUESTIONS 1-9: 0
SUM OF ALL RESPONSES TO PHQ QUESTIONS 1-9: 0
2. FEELING DOWN, DEPRESSED OR HOPELESS: NOT AT ALL

## 2025-06-15 ASSESSMENT — LIFESTYLE VARIABLES
HOW MANY STANDARD DRINKS CONTAINING ALCOHOL DO YOU HAVE ON A TYPICAL DAY: 1 OR 2
HOW OFTEN DO YOU HAVE SIX OR MORE DRINKS ON ONE OCCASION: 1
HOW OFTEN DO YOU HAVE A DRINK CONTAINING ALCOHOL: MONTHLY OR LESS
HOW MANY STANDARD DRINKS CONTAINING ALCOHOL DO YOU HAVE ON A TYPICAL DAY: 1
HOW OFTEN DO YOU HAVE A DRINK CONTAINING ALCOHOL: 2

## 2025-06-16 ENCOUNTER — OFFICE VISIT (OUTPATIENT)
Age: 75
End: 2025-06-16
Payer: MEDICARE

## 2025-06-16 VITALS
OXYGEN SATURATION: 97 % | BODY MASS INDEX: 37.22 KG/M2 | TEMPERATURE: 98.1 F | HEIGHT: 64 IN | RESPIRATION RATE: 16 BRPM | WEIGHT: 218 LBS | SYSTOLIC BLOOD PRESSURE: 132 MMHG | HEART RATE: 77 BPM | DIASTOLIC BLOOD PRESSURE: 78 MMHG

## 2025-06-16 DIAGNOSIS — D47.2 MGUS (MONOCLONAL GAMMOPATHY OF UNKNOWN SIGNIFICANCE): ICD-10-CM

## 2025-06-16 DIAGNOSIS — Z86.79 HISTORY OF SUPRAVENTRICULAR TACHYCARDIA: ICD-10-CM

## 2025-06-16 DIAGNOSIS — I10 BENIGN ESSENTIAL HYPERTENSION: ICD-10-CM

## 2025-06-16 DIAGNOSIS — Z00.00 MEDICARE ANNUAL WELLNESS VISIT, SUBSEQUENT: Primary | ICD-10-CM

## 2025-06-16 DIAGNOSIS — K52.9 CHRONIC DIARRHEA: ICD-10-CM

## 2025-06-16 DIAGNOSIS — E11.9 DIABETES MELLITUS TYPE 2, NONINSULIN DEPENDENT (HCC): ICD-10-CM

## 2025-06-16 DIAGNOSIS — E78.00 HYPERCHOLESTEROLEMIA: ICD-10-CM

## 2025-06-16 DIAGNOSIS — Z86.73 HISTORY OF TIA (TRANSIENT ISCHEMIC ATTACK): ICD-10-CM

## 2025-06-16 LAB
CHOLEST SERPL-MCNC: 182 MG/DL
CREAT UR-MCNC: 124 MG/DL
EST. AVERAGE GLUCOSE BLD GHB EST-MCNC: 128 MG/DL
HBA1C MFR BLD: 6.1 % (ref 4–5.6)
HDLC SERPL-MCNC: 59 MG/DL
HDLC SERPL: 3.1 (ref 0–5)
LDLC SERPL CALC-MCNC: 87.2 MG/DL (ref 0–100)
MICROALBUMIN UR-MCNC: 6.27 MG/DL
MICROALBUMIN/CREAT UR-RTO: 51 MG/G (ref 0–30)
TRIGL SERPL-MCNC: 179 MG/DL
VLDLC SERPL CALC-MCNC: 35.8 MG/DL

## 2025-06-16 PROCEDURE — 3078F DIAST BP <80 MM HG: CPT | Performed by: FAMILY MEDICINE

## 2025-06-16 PROCEDURE — 1160F RVW MEDS BY RX/DR IN RCRD: CPT | Performed by: FAMILY MEDICINE

## 2025-06-16 PROCEDURE — G0439 PPPS, SUBSEQ VISIT: HCPCS | Performed by: FAMILY MEDICINE

## 2025-06-16 PROCEDURE — 1159F MED LIST DOCD IN RCRD: CPT | Performed by: FAMILY MEDICINE

## 2025-06-16 PROCEDURE — 1090F PRES/ABSN URINE INCON ASSESS: CPT | Performed by: FAMILY MEDICINE

## 2025-06-16 PROCEDURE — G8417 CALC BMI ABV UP PARAM F/U: HCPCS | Performed by: FAMILY MEDICINE

## 2025-06-16 PROCEDURE — 1125F AMNT PAIN NOTED PAIN PRSNT: CPT | Performed by: FAMILY MEDICINE

## 2025-06-16 PROCEDURE — 99214 OFFICE O/P EST MOD 30 MIN: CPT | Performed by: FAMILY MEDICINE

## 2025-06-16 PROCEDURE — G8399 PT W/DXA RESULTS DOCUMENT: HCPCS | Performed by: FAMILY MEDICINE

## 2025-06-16 PROCEDURE — G8427 DOCREV CUR MEDS BY ELIG CLIN: HCPCS | Performed by: FAMILY MEDICINE

## 2025-06-16 PROCEDURE — 1036F TOBACCO NON-USER: CPT | Performed by: FAMILY MEDICINE

## 2025-06-16 PROCEDURE — 3044F HG A1C LEVEL LT 7.0%: CPT | Performed by: FAMILY MEDICINE

## 2025-06-16 PROCEDURE — 3075F SYST BP GE 130 - 139MM HG: CPT | Performed by: FAMILY MEDICINE

## 2025-06-16 PROCEDURE — 1123F ACP DISCUSS/DSCN MKR DOCD: CPT | Performed by: FAMILY MEDICINE

## 2025-06-16 RX ORDER — FERROUS SULFATE 325(65) MG
325 TABLET ORAL EVERY OTHER DAY
COMMUNITY

## 2025-06-16 RX ORDER — FUROSEMIDE 20 MG/1
20 TABLET ORAL DAILY PRN
COMMUNITY

## 2025-06-16 ASSESSMENT — ENCOUNTER SYMPTOMS
GASTROINTESTINAL NEGATIVE: 1
EYES NEGATIVE: 1
RESPIRATORY NEGATIVE: 1

## 2025-06-16 NOTE — PROGRESS NOTES
Chief Complaint   Patient presents with    Medicare AWV         Cholesterol Problem     fasting    Diabetes    Hypertension     1. \"Have you been to the ER, urgent care clinic since your last visit?  Hospitalized since your last visit?\" [facility]    2. \"Have you seen or consulted any other health care providers outside of the [facility] since your last visit?\" Yes cardio Dr Vazquez Casillas    3. For patients aged 45-75: Has the patient had a colonoscopy / FIT/ Cologuard? Yes - no Care Gap present 11/2024 Dr Mccullough @ [facility] removed 2 polyps pt not sure of repeat time frame       If the patient is female:    4. For patients aged 40-74: Has the patient had a mammogram within the past 2 years? Yes - no Care Gap present 10/2024      5. For patients aged 21-65: Has the patient had a pap smear? NA - based on age or sex      
    Substance Abuse Mother     Anesth Problems Neg Hx      Patient's complete Health Risk Assessment and screening values have been reviewed and are found in Flowsheets. The following problems were reviewed today and where indicated follow up appointments were made and/or referrals ordered.    Positive Risk Factor Screenings with Interventions:                Abnormal BMI (obese):  Body mass index is 37.13 kg/m². (!) Abnormal  Interventions:  See AVS for additional education material  See A/P for plan and any pertinent orders          Objective   Vitals:    06/16/25 1006   BP: 132/78   BP Site: Left Upper Arm   Patient Position: Sitting   BP Cuff Size: Large Adult   Pulse: 77   Resp: 16   Temp: 98.1 °F (36.7 °C)   TempSrc: Oral   SpO2: 97%   Weight: 98.9 kg (218 lb)   Height: 1.632 m (5' 4.25\")      Body mass index is 37.13 kg/m².         Physical Exam  Vitals reviewed.   Constitutional:       General: She is not in acute distress.  HENT:      Right Ear: Tympanic membrane normal.      Left Ear: Tympanic membrane normal.      Mouth/Throat:      Mouth: Mucous membranes are moist.      Pharynx: Oropharynx is clear.   Eyes:      Conjunctiva/sclera: Conjunctivae normal.   Neck:      Thyroid: No thyroid mass, thyromegaly or thyroid tenderness.      Vascular: No carotid bruit.   Cardiovascular:      Rate and Rhythm: Normal rate and regular rhythm.   Pulmonary:      Effort: Pulmonary effort is normal.   Abdominal:      Palpations: Abdomen is soft.      Tenderness: There is no abdominal tenderness.   Musculoskeletal:      Cervical back: Neck supple.   Lymphadenopathy:      Cervical: No cervical adenopathy.   Skin:     General: Skin is warm and dry.   Neurological:      General: No focal deficit present.      Mental Status: Mental status is at baseline.   Psychiatric:         Mood and Affect: Mood normal.              CareTeam (Including outside providers/suppliers regularly involved in providing care):   Patient Care

## 2025-06-22 ENCOUNTER — RESULTS FOLLOW-UP (OUTPATIENT)
Age: 75
End: 2025-06-22

## 2025-06-29 DIAGNOSIS — I10 BENIGN ESSENTIAL HYPERTENSION: ICD-10-CM

## 2025-07-01 RX ORDER — CLOPIDOGREL BISULFATE 75 MG/1
75 TABLET ORAL DAILY
Qty: 90 TABLET | Refills: 3 | Status: SHIPPED | OUTPATIENT
Start: 2025-07-01

## 2025-07-01 RX ORDER — LOSARTAN POTASSIUM 50 MG/1
50 TABLET ORAL DAILY
Qty: 90 TABLET | Refills: 3 | Status: SHIPPED | OUTPATIENT
Start: 2025-07-01

## 2025-07-01 NOTE — TELEPHONE ENCOUNTER
PCP: Shelby Dunn MD    Last appt: 6/16/2025     No future appointments.    Requested Prescriptions     Pending Prescriptions Disp Refills    clopidogrel (PLAVIX) 75 MG tablet [Pharmacy Med Name: CLOPIDOGREL 75 MG TAB] 90 tablet 3     Sig: TAKE ONE TABLET BY MOUTH ONE TIME DAILY    losartan (COZAAR) 50 MG tablet [Pharmacy Med Name: LOSARTAN 50 MG TAB[*]] 90 tablet 1     Sig: TAKE ONE TABLET BY MOUTH ONE TIME DAILY       Prior labs and Blood pressures:  BP Readings from Last 3 Encounters:   06/16/25 132/78   06/02/25 111/71   05/20/25 111/66     Lab Results   Component Value Date/Time     04/20/2025 04:38 AM    K 3.4 04/20/2025 04:38 AM     04/20/2025 04:38 AM    CO2 31 04/20/2025 04:38 AM    BUN 11 04/20/2025 04:38 AM    GFRAA >60 12/08/2021 10:52 AM     No results found for: \"HBA1C\", \"[id number]\"  Lab Results   Component Value Date/Time    CHOL 182 06/16/2025 11:01 AM    HDL 59 06/16/2025 11:01 AM    LDL 87.2 06/16/2025 11:01 AM    LDL 55.6 04/29/2024 10:10 AM    VLDL 35.8 06/16/2025 11:01 AM     No results found for: \"VITD3\"    Lab Results   Component Value Date/Time    TSH 0.60 04/20/2025 04:38 AM

## 2025-07-28 RX ORDER — SIMVASTATIN 10 MG
10 TABLET ORAL NIGHTLY
Qty: 90 TABLET | Refills: 2 | Status: SHIPPED | OUTPATIENT
Start: 2025-07-28

## 2025-07-28 RX ORDER — METOPROLOL TARTRATE 25 MG/1
25 TABLET, FILM COATED ORAL EVERY 12 HOURS
Qty: 180 TABLET | Refills: 2 | Status: SHIPPED | OUTPATIENT
Start: 2025-07-28

## 2025-07-28 NOTE — TELEPHONE ENCOUNTER
PCP: Shelby Dunn MD    Last appt: 6/16/2025     No future appointments.    Requested Prescriptions     Pending Prescriptions Disp Refills    simvastatin (ZOCOR) 10 MG tablet [Pharmacy Med Name: SIMVASTATIN 10 MG TAB[*]] 90 tablet 0     Sig: TAKE ONE TABLET BY MOUTH EVERY EVENING    metoprolol tartrate (LOPRESSOR) 25 MG tablet [Pharmacy Med Name: METOPROLOL TART 25 MG TAB[*]] 180 tablet 0     Sig: TAKE ONE TABLET BY MOUTH EVERY 12 HOURS         Prior labs and Blood pressures:  BP Readings from Last 3 Encounters:   06/16/25 132/78   06/02/25 111/71   05/20/25 111/66     Lab Results   Component Value Date/Time     04/20/2025 04:38 AM    K 3.4 04/20/2025 04:38 AM     04/20/2025 04:38 AM    CO2 31 04/20/2025 04:38 AM    BUN 11 04/20/2025 04:38 AM    GFRAA >60 12/08/2021 10:52 AM     Lab Results   Component Value Date/Time    CHOL 182 06/16/2025 11:01 AM    HDL 59 06/16/2025 11:01 AM    LDL 87.2 06/16/2025 11:01 AM    LDL 55.6 04/29/2024 10:10 AM    VLDL 35.8 06/16/2025 11:01 AM      Lab Results   Component Value Date/Time    TSH 0.60 04/20/2025 04:38 AM